# Patient Record
Sex: FEMALE | Race: OTHER | HISPANIC OR LATINO | URBAN - METROPOLITAN AREA
[De-identification: names, ages, dates, MRNs, and addresses within clinical notes are randomized per-mention and may not be internally consistent; named-entity substitution may affect disease eponyms.]

---

## 2017-02-17 ENCOUNTER — OUTPATIENT (OUTPATIENT)
Dept: OUTPATIENT SERVICES | Facility: HOSPITAL | Age: 57
LOS: 1 days | End: 2017-02-17
Payer: COMMERCIAL

## 2017-02-17 VITALS
RESPIRATION RATE: 16 BRPM | HEART RATE: 68 BPM | WEIGHT: 175.93 LBS | HEIGHT: 62 IN | SYSTOLIC BLOOD PRESSURE: 110 MMHG | TEMPERATURE: 98 F | DIASTOLIC BLOOD PRESSURE: 70 MMHG

## 2017-02-17 DIAGNOSIS — J39.8 OTHER SPECIFIED DISEASES OF UPPER RESPIRATORY TRACT: ICD-10-CM

## 2017-02-17 DIAGNOSIS — E21.3 HYPERPARATHYROIDISM, UNSPECIFIED: Chronic | ICD-10-CM

## 2017-02-17 DIAGNOSIS — Z98.89 OTHER SPECIFIED POSTPROCEDURAL STATES: Chronic | ICD-10-CM

## 2017-02-17 DIAGNOSIS — J39.8 OTHER SPECIFIED DISEASES OF UPPER RESPIRATORY TRACT: Chronic | ICD-10-CM

## 2017-02-17 DIAGNOSIS — K25.9 GASTRIC ULCER, UNSPECIFIED AS ACUTE OR CHRONIC, WITHOUT HEMORRHAGE OR PERFORATION: Chronic | ICD-10-CM

## 2017-02-17 LAB
BUN SERPL-MCNC: 15 MG/DL — SIGNIFICANT CHANGE UP (ref 7–23)
CALCIUM SERPL-MCNC: 10.1 MG/DL — SIGNIFICANT CHANGE UP (ref 8.4–10.5)
CHLORIDE SERPL-SCNC: 102 MMOL/L — SIGNIFICANT CHANGE UP (ref 98–107)
CO2 SERPL-SCNC: 27 MMOL/L — SIGNIFICANT CHANGE UP (ref 22–31)
CREAT SERPL-MCNC: 0.6 MG/DL — SIGNIFICANT CHANGE UP (ref 0.5–1.3)
GLUCOSE SERPL-MCNC: 93 MG/DL — SIGNIFICANT CHANGE UP (ref 70–99)
HCT VFR BLD CALC: 39.3 % — SIGNIFICANT CHANGE UP (ref 34.5–45)
HGB BLD-MCNC: 13 G/DL — SIGNIFICANT CHANGE UP (ref 11.5–15.5)
MCHC RBC-ENTMCNC: 31.8 PG — SIGNIFICANT CHANGE UP (ref 27–34)
MCHC RBC-ENTMCNC: 33.1 % — SIGNIFICANT CHANGE UP (ref 32–36)
MCV RBC AUTO: 96.1 FL — SIGNIFICANT CHANGE UP (ref 80–100)
PLATELET # BLD AUTO: 264 K/UL — SIGNIFICANT CHANGE UP (ref 150–400)
PMV BLD: 11.2 FL — SIGNIFICANT CHANGE UP (ref 7–13)
POTASSIUM SERPL-MCNC: 3.8 MMOL/L — SIGNIFICANT CHANGE UP (ref 3.5–5.3)
POTASSIUM SERPL-SCNC: 3.8 MMOL/L — SIGNIFICANT CHANGE UP (ref 3.5–5.3)
RBC # BLD: 4.09 M/UL — SIGNIFICANT CHANGE UP (ref 3.8–5.2)
RBC # FLD: 12 % — SIGNIFICANT CHANGE UP (ref 10.3–14.5)
SODIUM SERPL-SCNC: 141 MMOL/L — SIGNIFICANT CHANGE UP (ref 135–145)
WBC # BLD: 5.62 K/UL — SIGNIFICANT CHANGE UP (ref 3.8–10.5)
WBC # FLD AUTO: 5.62 K/UL — SIGNIFICANT CHANGE UP (ref 3.8–10.5)

## 2017-02-17 PROCEDURE — 93010 ELECTROCARDIOGRAM REPORT: CPT

## 2017-02-17 RX ORDER — SODIUM CHLORIDE 9 MG/ML
1000 INJECTION, SOLUTION INTRAVENOUS
Qty: 0 | Refills: 0 | Status: DISCONTINUED | OUTPATIENT
Start: 2017-02-27 | End: 2017-03-14

## 2017-02-17 NOTE — H&P PST ADULT - NEUROLOGICAL DETAILS
sensation intact/responds to verbal commands/responds to pain/alert and oriented x 3 responds to verbal commands/sensation intact/responds to pain/normal strength/alert and oriented x 3

## 2017-02-17 NOTE — H&P PST ADULT - MUSCULOSKELETAL
details… detailed exam ROM intact/no joint swelling/no joint erythema normal strength/no calf tenderness/ROM intact/no joint swelling/no joint erythema

## 2017-02-17 NOTE — H&P PST ADULT - PSH
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

## 2017-02-17 NOTE — H&P PST ADULT - LYMPHATIC
supraclavicular R/anterior cervical R/posterior cervical L/posterior cervical R/anterior cervical L/supraclavicular L No palpable cervical and supraclavicular lymphadenopathy

## 2017-02-17 NOTE — H&P PST ADULT - RS GEN PE MLT RESP DETAILS PC
no rhonchi/breath sounds equal/clear to auscultation bilaterally/no wheezes/no rales/airway patent no wheezes/respirations non-labored/good air movement/no rales/airway patent/clear to auscultation bilaterally/no rhonchi/breath sounds equal

## 2017-02-17 NOTE — H&P PST ADULT - HISTORY OF PRESENT ILLNESS
This is a 57 y/o female who presents with h/o eventual diagnosis of gastric ulcer after "four endoscopies" in February 2016.  Shortly after, she developed SOB with eventual ER admission and necessity for tracheostomy in April 2016 documented on CT scan. Trach removed and presents today for second followup surveillance bronchoscopy scheduled on 8/1/2016. 57 y/o female who presents with h/o eventual diagnosis of gastric ulcer after "four endoscopies" in February 2016.  Shortly after, she developed SOB with eventual ER admission and necessity for tracheostomy in April 2016 documented on CT scan. Trach removed and presents today for followup surveillance bronchoscopy scheduled on 2/27/2017.

## 2017-02-17 NOTE — H&P PST ADULT - NEGATIVE ALLERGY TYPES
no indoor environmental allergies/no reactions to animals/no reactions to medicines/no reactions to food/no reactions to insect bites/no outdoor environmental allergies

## 2017-02-17 NOTE — H&P PST ADULT - NEGATIVE ENMT SYMPTOMS
no tinnitus/no hearing difficulty/no nasal discharge/no post-nasal discharge/no nasal congestion/no ear pain/no vertigo

## 2017-02-17 NOTE — H&P PST ADULT - MALLAMPATI CLASS
I  with phonation Class I (easy) - visualization of the soft palate, fauces, uvula, and both anterior and posterior pillars

## 2017-02-17 NOTE — H&P PST ADULT - PROBLEM SELECTOR PLAN 1
Scheduled for surveillance bronchoscopy on 2/27/2017. labs done and results pending. Preop instruction given and explained. Famotidine provided with instruction. Verbalized understanding.

## 2017-02-17 NOTE — H&P PST ADULT - NEGATIVE GENERAL GENITOURINARY SYMPTOMS
no renal colic/no incontinence/normal urinary frequency/no hematuria/no bladder infections no flank pain R/no renal colic/no hematuria/no flank pain L/normal urinary frequency/no bladder infections/no incontinence

## 2017-02-17 NOTE — H&P PST ADULT - PMH
Arthritis  right shoulder  Arthritis of shoulder region, right    Constipation    Gastric ulcer  February 2016  Obesity

## 2017-02-17 NOTE — H&P PST ADULT - FAMILY HISTORY
Father  Still living? Unknown  Family history of MI (myocardial infarction), Age at diagnosis: 81-90     Sibling  Still living? No  Family history of MI (myocardial infarction), Age at diagnosis: 31-40

## 2017-02-17 NOTE — H&P PST ADULT - NEGATIVE NEUROLOGICAL SYMPTOMS
no transient paralysis/no weakness/no focal seizures/no paresthesias/no generalized seizures/no headache/no difficulty walking/no vertigo

## 2017-02-27 ENCOUNTER — APPOINTMENT (OUTPATIENT)
Dept: THORACIC SURGERY | Facility: HOSPITAL | Age: 57
End: 2017-02-27

## 2017-02-27 ENCOUNTER — OUTPATIENT (OUTPATIENT)
Dept: OUTPATIENT SERVICES | Facility: HOSPITAL | Age: 57
LOS: 1 days | Discharge: HOME | End: 2017-02-27

## 2017-02-27 ENCOUNTER — TRANSCRIPTION ENCOUNTER (OUTPATIENT)
Age: 57
End: 2017-02-27

## 2017-02-27 VITALS
TEMPERATURE: 98 F | OXYGEN SATURATION: 97 % | RESPIRATION RATE: 16 BRPM | DIASTOLIC BLOOD PRESSURE: 82 MMHG | HEIGHT: 62 IN | HEART RATE: 71 BPM | WEIGHT: 175.93 LBS | SYSTOLIC BLOOD PRESSURE: 120 MMHG

## 2017-02-27 VITALS
RESPIRATION RATE: 15 BRPM | SYSTOLIC BLOOD PRESSURE: 128 MMHG | DIASTOLIC BLOOD PRESSURE: 99 MMHG | HEART RATE: 67 BPM | OXYGEN SATURATION: 100 % | TEMPERATURE: 98 F

## 2017-02-27 DIAGNOSIS — K25.9 GASTRIC ULCER, UNSPECIFIED AS ACUTE OR CHRONIC, WITHOUT HEMORRHAGE OR PERFORATION: Chronic | ICD-10-CM

## 2017-02-27 DIAGNOSIS — J39.8 OTHER SPECIFIED DISEASES OF UPPER RESPIRATORY TRACT: Chronic | ICD-10-CM

## 2017-02-27 DIAGNOSIS — J39.8 OTHER SPECIFIED DISEASES OF UPPER RESPIRATORY TRACT: ICD-10-CM

## 2017-02-27 DIAGNOSIS — E21.3 HYPERPARATHYROIDISM, UNSPECIFIED: Chronic | ICD-10-CM

## 2017-02-27 DIAGNOSIS — Z98.89 OTHER SPECIFIED POSTPROCEDURAL STATES: Chronic | ICD-10-CM

## 2017-02-27 NOTE — ASU PATIENT PROFILE, ADULT - PSH
Gastric ulcer  "four endoscopies" for diagnosis of gastric ulcer in February 2016  Hyperparathyroidism  2012 parathyroidectomy  S/P bronchoscopy  5/16  Tracheal stenosis  April 2016 Gastric ulcer  "four endoscopies" for diagnosis of gastric ulcer in February 2016  Hyperparathyroidism  2012 parathyroidectomy  S/P bronchoscopy  5/16  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

## 2017-02-27 NOTE — ASU DISCHARGE PLAN (ADULT/PEDIATRIC). - NURSING INSTRUCTIONS
Cool and warm liquids that are not irritating to the throat should be given for the first day or two. Avoid hot liquids. Avoid citrus juices and milk. Advance at your own pace starting with soft foods and advancing to a regular diet. Avoid rough and scratchy foods and foods that are difficult to chew for approximately 5 days. Avoid strenuous exercise and blowing of nose.

## 2017-02-27 NOTE — ASU DISCHARGE PLAN (ADULT/PEDIATRIC). - MEDICATION SUMMARY - MEDICATIONS TO TAKE
I will START or STAY ON the medications listed below when I get home from the hospital:    Calcium 500+D  --  by mouth once a day  -- Indication: For supplement    B Complex 50  --   once a day  -- Indication: For supplement    multivitamin  --   once a day last dose 2/20  -- Indication: For supplement    Vitamin D3 50,000 intl units oral capsule  --  by mouth once a week on fridays  -- Indication: For supplement

## 2017-02-27 NOTE — ASU DISCHARGE PLAN (ADULT/PEDIATRIC). - NOTIFY
shortness of breath Bleeding that does not stop/Excessive Diarrhea/Increased Irritability or Sluggishness/Pain not relieved by Medications/Fever greater than 101/Inability to Tolerate Liquids or Foods/Unable to Urinate/Swelling that continues/shortness of breath/Persistent Nausea and Vomiting

## 2017-11-27 ENCOUNTER — TRANSCRIPTION ENCOUNTER (OUTPATIENT)
Age: 57
End: 2017-11-27

## 2017-12-26 ENCOUNTER — TRANSCRIPTION ENCOUNTER (OUTPATIENT)
Age: 57
End: 2017-12-26

## 2018-05-06 ENCOUNTER — TRANSCRIPTION ENCOUNTER (OUTPATIENT)
Age: 58
End: 2018-05-06

## 2018-07-31 ENCOUNTER — EMERGENCY (EMERGENCY)
Facility: HOSPITAL | Age: 58
LOS: 1 days | Discharge: HOME | End: 2018-07-31
Attending: EMERGENCY MEDICINE
Payer: COMMERCIAL

## 2018-07-31 VITALS
HEART RATE: 83 BPM | RESPIRATION RATE: 18 BRPM | SYSTOLIC BLOOD PRESSURE: 104 MMHG | TEMPERATURE: 98 F | OXYGEN SATURATION: 96 % | DIASTOLIC BLOOD PRESSURE: 61 MMHG

## 2018-07-31 DIAGNOSIS — Z98.89 OTHER SPECIFIED POSTPROCEDURAL STATES: Chronic | ICD-10-CM

## 2018-07-31 DIAGNOSIS — E21.3 HYPERPARATHYROIDISM, UNSPECIFIED: Chronic | ICD-10-CM

## 2018-07-31 DIAGNOSIS — K25.9 GASTRIC ULCER, UNSPECIFIED AS ACUTE OR CHRONIC, WITHOUT HEMORRHAGE OR PERFORATION: Chronic | ICD-10-CM

## 2018-07-31 DIAGNOSIS — J39.8 OTHER SPECIFIED DISEASES OF UPPER RESPIRATORY TRACT: Chronic | ICD-10-CM

## 2018-07-31 LAB
ANION GAP SERPL CALC-SCNC: 6 MMOL/L — SIGNIFICANT CHANGE UP (ref 5–17)
APPEARANCE UR: CLEAR — SIGNIFICANT CHANGE UP
BASOPHILS # BLD AUTO: 0.1 K/UL — SIGNIFICANT CHANGE UP (ref 0–0.2)
BASOPHILS NFR BLD AUTO: 1 % — SIGNIFICANT CHANGE UP (ref 0–2)
BILIRUB UR-MCNC: NEGATIVE — SIGNIFICANT CHANGE UP
BUN SERPL-MCNC: 13 MG/DL — SIGNIFICANT CHANGE UP (ref 7–18)
CALCIUM SERPL-MCNC: 9.7 MG/DL — SIGNIFICANT CHANGE UP (ref 8.4–10.5)
CHLORIDE SERPL-SCNC: 108 MMOL/L — SIGNIFICANT CHANGE UP (ref 96–108)
CO2 SERPL-SCNC: 28 MMOL/L — SIGNIFICANT CHANGE UP (ref 22–31)
COLOR SPEC: YELLOW — SIGNIFICANT CHANGE UP
CREAT SERPL-MCNC: 0.64 MG/DL — SIGNIFICANT CHANGE UP (ref 0.5–1.3)
DIFF PNL FLD: NEGATIVE — SIGNIFICANT CHANGE UP
EOSINOPHIL # BLD AUTO: 0.2 K/UL — SIGNIFICANT CHANGE UP (ref 0–0.5)
EOSINOPHIL NFR BLD AUTO: 3 % — SIGNIFICANT CHANGE UP (ref 0–6)
GLUCOSE SERPL-MCNC: 108 MG/DL — HIGH (ref 70–99)
GLUCOSE UR QL: NEGATIVE — SIGNIFICANT CHANGE UP
HCT VFR BLD CALC: 39.9 % — SIGNIFICANT CHANGE UP (ref 34.5–45)
HGB BLD-MCNC: 13 G/DL — SIGNIFICANT CHANGE UP (ref 11.5–15.5)
KETONES UR-MCNC: NEGATIVE — SIGNIFICANT CHANGE UP
LEUKOCYTE ESTERASE UR-ACNC: NEGATIVE — SIGNIFICANT CHANGE UP
LYMPHOCYTES # BLD AUTO: 2.3 K/UL — SIGNIFICANT CHANGE UP (ref 1–3.3)
LYMPHOCYTES # BLD AUTO: 38.2 % — SIGNIFICANT CHANGE UP (ref 13–44)
MCHC RBC-ENTMCNC: 31.9 PG — SIGNIFICANT CHANGE UP (ref 27–34)
MCHC RBC-ENTMCNC: 32.5 GM/DL — SIGNIFICANT CHANGE UP (ref 32–36)
MCV RBC AUTO: 98 FL — SIGNIFICANT CHANGE UP (ref 80–100)
MONOCYTES # BLD AUTO: 0.3 K/UL — SIGNIFICANT CHANGE UP (ref 0–0.9)
MONOCYTES NFR BLD AUTO: 5.7 % — SIGNIFICANT CHANGE UP (ref 2–14)
NEUTROPHILS # BLD AUTO: 3.1 K/UL — SIGNIFICANT CHANGE UP (ref 1.8–7.4)
NEUTROPHILS NFR BLD AUTO: 52.1 % — SIGNIFICANT CHANGE UP (ref 43–77)
NITRITE UR-MCNC: NEGATIVE — SIGNIFICANT CHANGE UP
PH UR: 7 — SIGNIFICANT CHANGE UP (ref 5–8)
PLATELET # BLD AUTO: 262 K/UL — SIGNIFICANT CHANGE UP (ref 150–400)
POTASSIUM SERPL-MCNC: 4 MMOL/L — SIGNIFICANT CHANGE UP (ref 3.5–5.3)
POTASSIUM SERPL-SCNC: 4 MMOL/L — SIGNIFICANT CHANGE UP (ref 3.5–5.3)
PROT UR-MCNC: NEGATIVE — SIGNIFICANT CHANGE UP
RBC # BLD: 4.07 M/UL — SIGNIFICANT CHANGE UP (ref 3.8–5.2)
RBC # FLD: 11.6 % — SIGNIFICANT CHANGE UP (ref 10.3–14.5)
SODIUM SERPL-SCNC: 142 MMOL/L — SIGNIFICANT CHANGE UP (ref 135–145)
SP GR SPEC: 1 — LOW (ref 1.01–1.02)
UROBILINOGEN FLD QL: NEGATIVE — SIGNIFICANT CHANGE UP
WBC # BLD: 6 K/UL — SIGNIFICANT CHANGE UP (ref 3.8–10.5)
WBC # FLD AUTO: 6 K/UL — SIGNIFICANT CHANGE UP (ref 3.8–10.5)

## 2018-07-31 PROCEDURE — 80048 BASIC METABOLIC PNL TOTAL CA: CPT

## 2018-07-31 PROCEDURE — 99284 EMERGENCY DEPT VISIT MOD MDM: CPT | Mod: 25

## 2018-07-31 PROCEDURE — 72100 X-RAY EXAM L-S SPINE 2/3 VWS: CPT | Mod: 26

## 2018-07-31 PROCEDURE — 81003 URINALYSIS AUTO W/O SCOPE: CPT

## 2018-07-31 PROCEDURE — 99284 EMERGENCY DEPT VISIT MOD MDM: CPT

## 2018-07-31 PROCEDURE — 72100 X-RAY EXAM L-S SPINE 2/3 VWS: CPT

## 2018-07-31 PROCEDURE — 85027 COMPLETE CBC AUTOMATED: CPT

## 2018-07-31 PROCEDURE — 73080 X-RAY EXAM OF ELBOW: CPT

## 2018-07-31 PROCEDURE — 96372 THER/PROPH/DIAG INJ SC/IM: CPT

## 2018-07-31 PROCEDURE — 73080 X-RAY EXAM OF ELBOW: CPT | Mod: 26,LT

## 2018-07-31 RX ORDER — ACETAMINOPHEN 500 MG
650 TABLET ORAL ONCE
Qty: 0 | Refills: 0 | Status: COMPLETED | OUTPATIENT
Start: 2018-07-31 | End: 2018-07-31

## 2018-07-31 RX ORDER — DIAZEPAM 5 MG
5 TABLET ORAL ONCE
Qty: 0 | Refills: 0 | Status: DISCONTINUED | OUTPATIENT
Start: 2018-07-31 | End: 2018-07-31

## 2018-07-31 RX ORDER — IBUPROFEN 200 MG
600 TABLET ORAL ONCE
Qty: 0 | Refills: 0 | Status: COMPLETED | OUTPATIENT
Start: 2018-07-31 | End: 2018-07-31

## 2018-07-31 RX ORDER — KETOROLAC TROMETHAMINE 30 MG/ML
30 SYRINGE (ML) INJECTION ONCE
Qty: 0 | Refills: 0 | Status: DISCONTINUED | OUTPATIENT
Start: 2018-07-31 | End: 2018-07-31

## 2018-07-31 RX ADMIN — Medication 30 MILLIGRAM(S): at 14:40

## 2018-07-31 NOTE — ED PROVIDER NOTE - OBJECTIVE STATEMENT
59 y/o F pt with PMHx of thyroid disease (no medications), arthritis, constipation, and gastric ulcer presents to ED c/o progressively worsening R lower back pain x 1 week. Patient denies fever, chills, nausea, vomiting, dysuria, or any other complaints.

## 2018-07-31 NOTE — ED ADULT TRIAGE NOTE - CHIEF COMPLAINT QUOTE
right lower back pain radiating to RLQ x 1 week on and off and getting worse, patient denies nausea and denies vomiting

## 2018-07-31 NOTE — ED ADULT NURSE NOTE - NSIMPLEMENTINTERV_GEN_ALL_ED
Implemented All Universal Safety Interventions:  [place] to call system. Call bell, personal items and telephone within reach. Instruct patient to call for assistance. Room bathroom lighting operational. Non-slip footwear when patient is off stretcher. Physically safe environment: no spills, clutter or unnecessary equipment. Stretcher in lowest position, wheels locked, appropriate side rails in place.

## 2018-07-31 NOTE — ED ADULT NURSE NOTE - CHPI ED NUR SYMPTOMS NEG
no bowel dysfunction/no constipation/no motor function loss/no numbness/no fatigue/no difficulty bearing weight/no bladder dysfunction/no anorexia/no neck tenderness/no tingling

## 2018-07-31 NOTE — ED PROVIDER NOTE - PROGRESS NOTE DETAILS
Labs, urine unremarkable. Xrays shows chronic findings. No acute fracture. Will dc with PMD follow up. History and findings suggestive of back strain/spasm. Pt is well appearing walking with steady gait, stable for discharge and follow up without fail with medical doctor. I had a detailed discussion with the patient and/or guardian regarding the historical points, exam findings, and any diagnostic results supporting the discharge diagnosis. Pt educated on care and need for follow up. Strict return instructions and red flag signs and symptoms discussed with patient. Questions answered. Pt shows understanding of discharge information and agrees to follow.

## 2018-07-31 NOTE — ED STATDOCS - OBJECTIVE STATEMENT
c/o low back pain x three days right> left , worse with movement , normal gait observed   no midline tenderness   no urinary complaints no h/o trauma , no heavy lifting   valium / motrin

## 2018-07-31 NOTE — ED ADULT NURSE NOTE - OBJECTIVE STATEMENT
Pt AOx3, ambulatory, c/o Right lower back x 1 week, radiating to RLQ, and worsenign past couple of days. Pt also endorses Left elbow pain, Denies n/v, fall/trauma, HA, SOB, chest pain, hematuria, dysuria

## 2018-07-31 NOTE — ED PROVIDER NOTE - PSH
Gastric ulcer  "four endoscopies" for diagnosis of gastric ulcer in February 2016  Hyperparathyroidism  2012 parathyroidectomy  S/P bronchoscopy  5/16  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

## 2018-07-31 NOTE — ED PROVIDER NOTE - PMH
Arthritis  right shoulder  Arthritis of shoulder region, right    Constipation    Gastric ulcer  February 2016  Obesity    Thyroid disease

## 2018-10-29 PROBLEM — E07.9 DISORDER OF THYROID, UNSPECIFIED: Chronic | Status: ACTIVE | Noted: 2018-07-31

## 2018-11-05 ENCOUNTER — OUTPATIENT (OUTPATIENT)
Dept: OUTPATIENT SERVICES | Facility: HOSPITAL | Age: 58
LOS: 1 days | End: 2018-11-05

## 2018-11-05 VITALS
RESPIRATION RATE: 16 BRPM | DIASTOLIC BLOOD PRESSURE: 80 MMHG | TEMPERATURE: 98 F | HEIGHT: 64 IN | OXYGEN SATURATION: 97 % | SYSTOLIC BLOOD PRESSURE: 110 MMHG | WEIGHT: 188.94 LBS | HEART RATE: 70 BPM

## 2018-11-05 DIAGNOSIS — J39.8 OTHER SPECIFIED DISEASES OF UPPER RESPIRATORY TRACT: Chronic | ICD-10-CM

## 2018-11-05 DIAGNOSIS — J39.8 OTHER SPECIFIED DISEASES OF UPPER RESPIRATORY TRACT: ICD-10-CM

## 2018-11-05 DIAGNOSIS — Z98.89 OTHER SPECIFIED POSTPROCEDURAL STATES: Chronic | ICD-10-CM

## 2018-11-05 DIAGNOSIS — E03.9 HYPOTHYROIDISM, UNSPECIFIED: ICD-10-CM

## 2018-11-05 DIAGNOSIS — E21.3 HYPERPARATHYROIDISM, UNSPECIFIED: Chronic | ICD-10-CM

## 2018-11-05 DIAGNOSIS — Z98.890 OTHER SPECIFIED POSTPROCEDURAL STATES: Chronic | ICD-10-CM

## 2018-11-05 DIAGNOSIS — K25.9 GASTRIC ULCER, UNSPECIFIED AS ACUTE OR CHRONIC, WITHOUT HEMORRHAGE OR PERFORATION: Chronic | ICD-10-CM

## 2018-11-05 LAB
ALBUMIN SERPL ELPH-MCNC: 3.6 G/DL — SIGNIFICANT CHANGE UP (ref 3.3–5)
ALP SERPL-CCNC: 61 U/L — SIGNIFICANT CHANGE UP (ref 40–120)
ALT FLD-CCNC: 22 U/L — SIGNIFICANT CHANGE UP (ref 4–33)
AST SERPL-CCNC: 25 U/L — SIGNIFICANT CHANGE UP (ref 4–32)
BILIRUB SERPL-MCNC: 0.3 MG/DL — SIGNIFICANT CHANGE UP (ref 0.2–1.2)
BUN SERPL-MCNC: 16 MG/DL — SIGNIFICANT CHANGE UP (ref 7–23)
CALCIUM SERPL-MCNC: 10 MG/DL — SIGNIFICANT CHANGE UP (ref 8.4–10.5)
CHLORIDE SERPL-SCNC: 107 MMOL/L — SIGNIFICANT CHANGE UP (ref 98–107)
CO2 SERPL-SCNC: 23 MMOL/L — SIGNIFICANT CHANGE UP (ref 22–31)
CREAT SERPL-MCNC: 0.54 MG/DL — SIGNIFICANT CHANGE UP (ref 0.5–1.3)
GLUCOSE SERPL-MCNC: 115 MG/DL — HIGH (ref 70–99)
HCT VFR BLD CALC: 38.3 % — SIGNIFICANT CHANGE UP (ref 34.5–45)
HGB BLD-MCNC: 12.5 G/DL — SIGNIFICANT CHANGE UP (ref 11.5–15.5)
MCHC RBC-ENTMCNC: 31.5 PG — SIGNIFICANT CHANGE UP (ref 27–34)
MCHC RBC-ENTMCNC: 32.6 % — SIGNIFICANT CHANGE UP (ref 32–36)
MCV RBC AUTO: 96.5 FL — SIGNIFICANT CHANGE UP (ref 80–100)
NRBC # FLD: 0 — SIGNIFICANT CHANGE UP
PLATELET # BLD AUTO: 248 K/UL — SIGNIFICANT CHANGE UP (ref 150–400)
PMV BLD: 10.9 FL — SIGNIFICANT CHANGE UP (ref 7–13)
POTASSIUM SERPL-MCNC: 4.2 MMOL/L — SIGNIFICANT CHANGE UP (ref 3.5–5.3)
POTASSIUM SERPL-SCNC: 4.2 MMOL/L — SIGNIFICANT CHANGE UP (ref 3.5–5.3)
PROT SERPL-MCNC: 7 G/DL — SIGNIFICANT CHANGE UP (ref 6–8.3)
RBC # BLD: 3.97 M/UL — SIGNIFICANT CHANGE UP (ref 3.8–5.2)
RBC # FLD: 11.6 % — SIGNIFICANT CHANGE UP (ref 10.3–14.5)
SODIUM SERPL-SCNC: 141 MMOL/L — SIGNIFICANT CHANGE UP (ref 135–145)
WBC # BLD: 4.47 K/UL — SIGNIFICANT CHANGE UP (ref 3.8–10.5)
WBC # FLD AUTO: 4.47 K/UL — SIGNIFICANT CHANGE UP (ref 3.8–10.5)

## 2018-11-05 RX ORDER — SODIUM CHLORIDE 9 MG/ML
1000 INJECTION, SOLUTION INTRAVENOUS
Qty: 0 | Refills: 0 | Status: DISCONTINUED | OUTPATIENT
Start: 2018-11-19 | End: 2018-12-04

## 2018-11-05 NOTE — H&P PST ADULT - NEGATIVE GENERAL GENITOURINARY SYMPTOMS
normal urinary frequency/no urine discoloration/no hematuria/no flank pain R/no incontinence/no bladder infections/no renal colic/no urinary hesitancy

## 2018-11-05 NOTE — H&P PST ADULT - PMH
Arthritis  right shoulder  Arthritis of shoulder region, right    Constipation    Fatty liver    Gallstones    Gastric ulcer  February 2016- h/o GI bleed which patient had to be intubated Jan 2016  History of tracheal stenosis    Hypothyroidism    Kidney stones    Lumbar back pain    Obesity    Osteoporosis    Other specified diseases of upper respiratory tract    Thyroid disease

## 2018-11-05 NOTE — H&P PST ADULT - NEGATIVE ENMT SYMPTOMS
no nasal discharge/no gum bleeding/no throat pain/no tinnitus/no vertigo/no sinus symptoms/no nasal congestion/no post-nasal discharge/no hearing difficulty/no ear pain/no nose bleeds/no dry mouth/no dysphagia

## 2018-11-05 NOTE — H&P PST ADULT - PROBLEM SELECTOR PLAN 1
Scheduled for Surveillance Bronchoscopy on 11/19/2018   Preop instructions given, pt verbalized understanding   GI prophylaxis provided

## 2018-11-05 NOTE — H&P PST ADULT - NEGATIVE OPHTHALMOLOGIC SYMPTOMS
no pain R/no lacrimation L/no lacrimation R/no blurred vision L/no diplopia/no photophobia/no blurred vision R/no pain L

## 2018-11-05 NOTE — H&P PST ADULT - MS GEN HX ROS MEA POS PC
arthralgia/arthritis/leg pain L/joint pain/leg pain R/neck pain/back pain/left knee and b/l shoulder pain

## 2018-11-05 NOTE — H&P PST ADULT - RS GEN PE MLT RESP DETAILS PC
respirations non-labored/no wheezes/good air movement/airway patent/no chest wall tenderness/breath sounds equal/clear to auscultation bilaterally

## 2018-11-05 NOTE — H&P PST ADULT - HISTORY OF PRESENT ILLNESS
57 y/o female with PMH of Hypothyroidism presents to [facility] for preoperative evaluation with diagnosis of other specified diseases of upper respiratory tract. H/o GI bleed in 2016 which resulted in emergency intubation for 5 days. Pt reports her trachea was damaged as a result of intubation. s/p tracheal resection and reconstruction April 2016.  She has undergone multiple surveillance bronchoscopies, last one in 2017. Scheduled for Surveillance Bronchoscopy on 11/19/2018   Pt reports voice hoarseness when talking for long periods. 57 y/o female with PMH of Hypothyroidism presents to [facility] for preoperative evaluation with diagnosis of other specified diseases of upper respiratory tract. H/o GI bleed in 2016 which resulted in emergency intubation for 5 days. Pt reports her trachea was damaged as a result of intubation. s/p tracheal resection and reconstruction April 2016. She has undergone multiple surveillance bronchoscopies, last one in 2017. Scheduled for Surveillance Bronchoscopy on 11/19/2018.   Pt reports voice hoarseness when talking for long periods.

## 2018-11-05 NOTE — H&P PST ADULT - NEGATIVE NEUROLOGICAL SYMPTOMS
no transient paralysis/no paresthesias/no syncope/no focal seizures/no tremors/no weakness/no generalized seizures/no difficulty walking/no loss of consciousness/no vertigo/no headache/no confusion/no hemiparesis/no facial palsy

## 2018-11-05 NOTE — H&P PST ADULT - PSH
Gastric ulcer  "four endoscopies" for diagnosis of gastric ulcer in February 2016  Hyperparathyroidism  2012 parathyroidectomy  S/P bronchoscopy  2017  S/P bronchoscopy  5/16  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

## 2018-11-05 NOTE — H&P PST ADULT - LYMPHATIC
anterior cervical L/posterior cervical R/anterior cervical R/posterior cervical L/supraclavicular L/supraclavicular R

## 2018-11-06 ENCOUNTER — EMERGENCY (EMERGENCY)
Facility: HOSPITAL | Age: 58
LOS: 1 days | Discharge: HOME | End: 2018-11-06
Attending: EMERGENCY MEDICINE
Payer: COMMERCIAL

## 2018-11-06 VITALS
WEIGHT: 188.94 LBS | TEMPERATURE: 98 F | SYSTOLIC BLOOD PRESSURE: 116 MMHG | RESPIRATION RATE: 18 BRPM | DIASTOLIC BLOOD PRESSURE: 77 MMHG | HEART RATE: 95 BPM | OXYGEN SATURATION: 98 % | HEIGHT: 63 IN

## 2018-11-06 DIAGNOSIS — J39.8 OTHER SPECIFIED DISEASES OF UPPER RESPIRATORY TRACT: Chronic | ICD-10-CM

## 2018-11-06 DIAGNOSIS — Z98.89 OTHER SPECIFIED POSTPROCEDURAL STATES: Chronic | ICD-10-CM

## 2018-11-06 DIAGNOSIS — Z98.890 OTHER SPECIFIED POSTPROCEDURAL STATES: Chronic | ICD-10-CM

## 2018-11-06 DIAGNOSIS — E21.3 HYPERPARATHYROIDISM, UNSPECIFIED: Chronic | ICD-10-CM

## 2018-11-06 DIAGNOSIS — K25.9 GASTRIC ULCER, UNSPECIFIED AS ACUTE OR CHRONIC, WITHOUT HEMORRHAGE OR PERFORATION: Chronic | ICD-10-CM

## 2018-11-06 LAB
ALBUMIN SERPL ELPH-MCNC: 3 G/DL — LOW (ref 3.5–5)
ALP SERPL-CCNC: 63 U/L — SIGNIFICANT CHANGE UP (ref 40–120)
ALT FLD-CCNC: 26 U/L DA — SIGNIFICANT CHANGE UP (ref 10–60)
ANION GAP SERPL CALC-SCNC: 7 MMOL/L — SIGNIFICANT CHANGE UP (ref 5–17)
APPEARANCE UR: CLEAR — SIGNIFICANT CHANGE UP
APTT BLD: 36.1 SEC — SIGNIFICANT CHANGE UP (ref 27.5–36.3)
AST SERPL-CCNC: 16 U/L — SIGNIFICANT CHANGE UP (ref 10–40)
BILIRUB SERPL-MCNC: 0.2 MG/DL — SIGNIFICANT CHANGE UP (ref 0.2–1.2)
BILIRUB UR-MCNC: NEGATIVE — SIGNIFICANT CHANGE UP
BUN SERPL-MCNC: 21 MG/DL — HIGH (ref 7–18)
CALCIUM SERPL-MCNC: 9.4 MG/DL — SIGNIFICANT CHANGE UP (ref 8.4–10.5)
CHLORIDE SERPL-SCNC: 110 MMOL/L — HIGH (ref 96–108)
CO2 SERPL-SCNC: 27 MMOL/L — SIGNIFICANT CHANGE UP (ref 22–31)
COLOR SPEC: YELLOW — SIGNIFICANT CHANGE UP
CREAT SERPL-MCNC: 0.59 MG/DL — SIGNIFICANT CHANGE UP (ref 0.5–1.3)
DIFF PNL FLD: NEGATIVE — SIGNIFICANT CHANGE UP
GLUCOSE SERPL-MCNC: 137 MG/DL — HIGH (ref 70–99)
GLUCOSE UR QL: NEGATIVE — SIGNIFICANT CHANGE UP
HCG SERPL-ACNC: 5 MIU/ML — SIGNIFICANT CHANGE UP
HCT VFR BLD CALC: 36 % — SIGNIFICANT CHANGE UP (ref 34.5–45)
HGB BLD-MCNC: 12 G/DL — SIGNIFICANT CHANGE UP (ref 11.5–15.5)
INR BLD: 0.91 RATIO — SIGNIFICANT CHANGE UP (ref 0.88–1.16)
KETONES UR-MCNC: NEGATIVE — SIGNIFICANT CHANGE UP
LEUKOCYTE ESTERASE UR-ACNC: ABNORMAL
MCHC RBC-ENTMCNC: 31.5 PG — SIGNIFICANT CHANGE UP (ref 27–34)
MCHC RBC-ENTMCNC: 33.3 GM/DL — SIGNIFICANT CHANGE UP (ref 32–36)
MCV RBC AUTO: 94.5 FL — SIGNIFICANT CHANGE UP (ref 80–100)
NITRITE UR-MCNC: NEGATIVE — SIGNIFICANT CHANGE UP
PH UR: 5 — SIGNIFICANT CHANGE UP (ref 5–8)
PLATELET # BLD AUTO: 246 K/UL — SIGNIFICANT CHANGE UP (ref 150–400)
POTASSIUM SERPL-MCNC: 4.4 MMOL/L — SIGNIFICANT CHANGE UP (ref 3.5–5.3)
POTASSIUM SERPL-SCNC: 4.4 MMOL/L — SIGNIFICANT CHANGE UP (ref 3.5–5.3)
PROT SERPL-MCNC: 6.8 G/DL — SIGNIFICANT CHANGE UP (ref 6–8.3)
PROT UR-MCNC: NEGATIVE — SIGNIFICANT CHANGE UP
PROTHROM AB SERPL-ACNC: 10.1 SEC — SIGNIFICANT CHANGE UP (ref 10–12.9)
RBC # BLD: 3.81 M/UL — SIGNIFICANT CHANGE UP (ref 3.8–5.2)
RBC # FLD: 11.3 % — SIGNIFICANT CHANGE UP (ref 10.3–14.5)
SODIUM SERPL-SCNC: 144 MMOL/L — SIGNIFICANT CHANGE UP (ref 135–145)
SP GR SPEC: 1.02 — SIGNIFICANT CHANGE UP (ref 1.01–1.02)
TROPONIN I SERPL-MCNC: <0.015 NG/ML — SIGNIFICANT CHANGE UP (ref 0–0.04)
UROBILINOGEN FLD QL: NEGATIVE — SIGNIFICANT CHANGE UP
WBC # BLD: 6.1 K/UL — SIGNIFICANT CHANGE UP (ref 3.8–10.5)
WBC # FLD AUTO: 6.1 K/UL — SIGNIFICANT CHANGE UP (ref 3.8–10.5)

## 2018-11-06 PROCEDURE — 99284 EMERGENCY DEPT VISIT MOD MDM: CPT | Mod: 25

## 2018-11-06 PROCEDURE — 71046 X-RAY EXAM CHEST 2 VIEWS: CPT | Mod: 26

## 2018-11-06 RX ORDER — ACETAMINOPHEN 500 MG
650 TABLET ORAL ONCE
Qty: 0 | Refills: 0 | Status: COMPLETED | OUTPATIENT
Start: 2018-11-06 | End: 2018-11-06

## 2018-11-06 RX ORDER — IBUPROFEN 200 MG
800 TABLET ORAL ONCE
Qty: 0 | Refills: 0 | Status: COMPLETED | OUTPATIENT
Start: 2018-11-06 | End: 2018-11-06

## 2018-11-06 NOTE — ED PROVIDER NOTE - OBJECTIVE STATEMENT
59 y/o F w/ PMHx of hypothyroidism, gallstones, GI bleed 2/2 ulcers presents to ED c/o intermittent, sharp, chest pain x 12 hours, with each episode lasting 2-3 minutes at a time, 8/10 in strength. Pt is in no pain currently. Notes that pain first onset w/ nothing in particular, returned when doing laundry. Pt denies any shortness of breath, nausea, vomiting, fever, cough, leg swelling or any other complaints. Denies any long distance traveling, recent surgery, oral contraceptive use, smoking. NKDA. 59 y/o F w/ PMHx of hypothyroidism, gallstones, GI bleed 2/2 ulcers presents to ED c/o intermittent, sharp, chest pain x 12 hours, with each episode lasting couple seconds at a time, 8/10 in strength. Pt is in no pain currently. Denies any exacerbating symptoms or any association w/ exertion. Notes that pain first onset w/ nothing in particular, returned when doing laundry. Pt denies any shortness of breath, nausea, vomiting, fever, cough, leg swelling or any other complaints. Denies any long distance traveling, recent surgery, oral contraceptive use, smoking. NKDA. 59 y/o F w/ PMHx of hypothyroidism, gallstones, kidney stones, GI bleed 2/2 ulcers presents to ED c/o intermittent, sharp, chest pain x 12 hours, with each episode lasting couple seconds at a time, 8/10 in strength. Pt is in no pain currently. Denies any exacerbating symptoms or any association w/ exertion. Notes that pain first onset w/ nothing in particular, returned when doing laundry. Pt denies any shortness of breath, nausea, vomiting, fever, cough, leg swelling or any other complaints. Denies any long distance traveling, recent surgery, oral contraceptive use, smoking. NKDA. 57 y/o F w/ PMHx of hypothyroidism, gallstones, kidney stones, GI bleed 2/2 ulcers presents to ED c/o intermittent, sharp, chest pain x 12 hours, with each episode lasting couple seconds at a time, 8/10 in strength. Pt is in no pain currently. Denies any exacerbating symptoms or any association w/ exertion. Notes that pain first onset w/ nothing in particular, returned when doing laundry. Pt denies any shortness of breath, nausea, vomiting, fever, cough, leg swelling or any other complaints. Denies any long distance traveling, recent surgery, oral contraceptive use, smoking. NKDA. Reports darker urine.

## 2018-11-06 NOTE — ED PROVIDER NOTE - CARE PLAN
Principal Discharge DX:	Atypical chest pain Principal Discharge DX:	Atypical chest pain  Secondary Diagnosis:	UTI (urinary tract infection)

## 2018-11-06 NOTE — ED PROVIDER NOTE - PROGRESS NOTE DETAILS
labs show neg trop x2, CXR unremarkable  discussed above with patient. On reeval patient well-appearing, denies recurrence of chest pain.  Pt stable for discharge to f/u with PMD/Cardiology as outpatient. labs show neg trop x2, CXR unremarkable  UA shows trace LE and few bacteria-given ceftin  discussed above with patient. On reeval patient well-appearing, denies recurrence of chest pain.  Pt stable for discharge to f/u with PMD/Cardiology as outpatient.

## 2018-11-06 NOTE — ED PROVIDER NOTE - MEDICAL DECISION MAKING DETAILS
59 y/o F w/ PMHx of hypothyroidism, GI bleed, here w/ intermittent, sharp, anterior chest pain. On exam, pt w/ tenderness to palpation over anterior chest. Will check EKG, labs, chest x-ray. Pt given Tylenol and Motrin for pain. Will re-assess.

## 2018-11-07 VITALS
RESPIRATION RATE: 17 BRPM | TEMPERATURE: 98 F | DIASTOLIC BLOOD PRESSURE: 60 MMHG | OXYGEN SATURATION: 97 % | HEART RATE: 64 BPM | SYSTOLIC BLOOD PRESSURE: 120 MMHG

## 2018-11-07 PROBLEM — K80.20 CALCULUS OF GALLBLADDER WITHOUT CHOLECYSTITIS WITHOUT OBSTRUCTION: Chronic | Status: ACTIVE | Noted: 2018-11-05

## 2018-11-07 PROBLEM — J39.8 OTHER SPECIFIED DISEASES OF UPPER RESPIRATORY TRACT: Chronic | Status: ACTIVE | Noted: 2018-11-05

## 2018-11-07 PROBLEM — M54.5 LOW BACK PAIN: Chronic | Status: ACTIVE | Noted: 2018-11-05

## 2018-11-07 PROBLEM — Z87.09 PERSONAL HISTORY OF OTHER DISEASES OF THE RESPIRATORY SYSTEM: Chronic | Status: ACTIVE | Noted: 2018-11-05

## 2018-11-07 PROBLEM — N20.0 CALCULUS OF KIDNEY: Chronic | Status: ACTIVE | Noted: 2018-11-05

## 2018-11-07 PROBLEM — E03.9 HYPOTHYROIDISM, UNSPECIFIED: Chronic | Status: ACTIVE | Noted: 2018-11-05

## 2018-11-07 PROBLEM — M81.0 AGE-RELATED OSTEOPOROSIS WITHOUT CURRENT PATHOLOGICAL FRACTURE: Chronic | Status: ACTIVE | Noted: 2018-11-05

## 2018-11-07 PROBLEM — K76.0 FATTY (CHANGE OF) LIVER, NOT ELSEWHERE CLASSIFIED: Chronic | Status: ACTIVE | Noted: 2018-11-05

## 2018-11-07 LAB — TROPONIN I SERPL-MCNC: <0.015 NG/ML — SIGNIFICANT CHANGE UP (ref 0–0.04)

## 2018-11-07 PROCEDURE — 80053 COMPREHEN METABOLIC PANEL: CPT

## 2018-11-07 PROCEDURE — 81001 URINALYSIS AUTO W/SCOPE: CPT

## 2018-11-07 PROCEDURE — 85730 THROMBOPLASTIN TIME PARTIAL: CPT

## 2018-11-07 PROCEDURE — 87086 URINE CULTURE/COLONY COUNT: CPT

## 2018-11-07 PROCEDURE — 84702 CHORIONIC GONADOTROPIN TEST: CPT

## 2018-11-07 PROCEDURE — 99284 EMERGENCY DEPT VISIT MOD MDM: CPT | Mod: 25

## 2018-11-07 PROCEDURE — 85610 PROTHROMBIN TIME: CPT

## 2018-11-07 PROCEDURE — 71046 X-RAY EXAM CHEST 2 VIEWS: CPT

## 2018-11-07 PROCEDURE — 93005 ELECTROCARDIOGRAM TRACING: CPT

## 2018-11-07 PROCEDURE — 96374 THER/PROPH/DIAG INJ IV PUSH: CPT

## 2018-11-07 PROCEDURE — 96375 TX/PRO/DX INJ NEW DRUG ADDON: CPT

## 2018-11-07 PROCEDURE — 85027 COMPLETE CBC AUTOMATED: CPT

## 2018-11-07 PROCEDURE — 84484 ASSAY OF TROPONIN QUANT: CPT

## 2018-11-07 RX ORDER — MORPHINE SULFATE 50 MG/1
2 CAPSULE, EXTENDED RELEASE ORAL ONCE
Qty: 0 | Refills: 0 | Status: DISCONTINUED | OUTPATIENT
Start: 2018-11-07 | End: 2018-11-07

## 2018-11-07 RX ORDER — KETOROLAC TROMETHAMINE 30 MG/ML
30 SYRINGE (ML) INJECTION ONCE
Qty: 0 | Refills: 0 | Status: DISCONTINUED | OUTPATIENT
Start: 2018-11-07 | End: 2018-11-07

## 2018-11-07 RX ORDER — CEFUROXIME AXETIL 250 MG
1 TABLET ORAL
Qty: 14 | Refills: 0
Start: 2018-11-07 | End: 2018-11-13

## 2018-11-07 RX ORDER — CEFUROXIME AXETIL 250 MG
250 TABLET ORAL ONCE
Qty: 0 | Refills: 0 | Status: COMPLETED | OUTPATIENT
Start: 2018-11-07 | End: 2018-11-07

## 2018-11-07 RX ADMIN — Medication 800 MILLIGRAM(S): at 01:35

## 2018-11-07 RX ADMIN — Medication 800 MILLIGRAM(S): at 00:06

## 2018-11-07 RX ADMIN — Medication 30 MILLIGRAM(S): at 05:04

## 2018-11-07 RX ADMIN — Medication 650 MILLIGRAM(S): at 00:06

## 2018-11-07 RX ADMIN — Medication 30 MILLIGRAM(S): at 04:50

## 2018-11-07 RX ADMIN — MORPHINE SULFATE 2 MILLIGRAM(S): 50 CAPSULE, EXTENDED RELEASE ORAL at 01:31

## 2018-11-07 RX ADMIN — Medication 250 MILLIGRAM(S): at 01:31

## 2018-11-07 RX ADMIN — MORPHINE SULFATE 2 MILLIGRAM(S): 50 CAPSULE, EXTENDED RELEASE ORAL at 01:35

## 2018-11-07 RX ADMIN — Medication 650 MILLIGRAM(S): at 01:35

## 2018-11-07 NOTE — ED ADULT NURSE NOTE - OBJECTIVE STATEMENT
57 y/o F w/ PMHx of hypothyroidism, gallstones, GI bleed 2/2 ulcers presents to ED c/o intermittent, sharp, chest pain x 12 hours, with each episode lasting couple seconds at a time, 8/10 in strength. Pt is in no pain currently. Denies any exacerbating symptoms or any association w/ exertion. Notes that pain first onset w/ nothing in particular, returned when doing laundry. Pt denies any shortness of breath, nausea, vomiting, fever, cough, leg swelling or any other complaints. Denies any long distance traveling, recent surgery, oral contraceptive use, smoking. NKDA.

## 2018-11-07 NOTE — ED ADULT NURSE NOTE - NSIMPLEMENTINTERV_GEN_ALL_ED
Implemented All Universal Safety Interventions:  [place] to call system. Call bell, personal items and telephone within reach. Instruct patient to call for assistance. Room bathroom lighting operational. Non-slip footwear when patient is off stretcher. Physically safe environment: no spills, clutter or unnecessary equipment. Stretcher in lowest position, wheels locked, appropriate side rails in place.

## 2018-11-08 LAB
CULTURE RESULTS: SIGNIFICANT CHANGE UP
SPECIMEN SOURCE: SIGNIFICANT CHANGE UP

## 2018-11-18 ENCOUNTER — TRANSCRIPTION ENCOUNTER (OUTPATIENT)
Age: 58
End: 2018-11-18

## 2018-11-19 ENCOUNTER — APPOINTMENT (OUTPATIENT)
Dept: THORACIC SURGERY | Facility: HOSPITAL | Age: 58
End: 2018-11-19

## 2018-11-19 ENCOUNTER — OUTPATIENT (OUTPATIENT)
Dept: OUTPATIENT SERVICES | Facility: HOSPITAL | Age: 58
LOS: 1 days | Discharge: HOME | End: 2018-11-19
Payer: COMMERCIAL

## 2018-11-19 VITALS
DIASTOLIC BLOOD PRESSURE: 67 MMHG | HEIGHT: 64 IN | HEART RATE: 92 BPM | OXYGEN SATURATION: 96 % | SYSTOLIC BLOOD PRESSURE: 124 MMHG | TEMPERATURE: 98 F | RESPIRATION RATE: 14 BRPM | WEIGHT: 188.94 LBS

## 2018-11-19 VITALS
OXYGEN SATURATION: 98 % | RESPIRATION RATE: 15 BRPM | SYSTOLIC BLOOD PRESSURE: 114 MMHG | HEART RATE: 81 BPM | DIASTOLIC BLOOD PRESSURE: 53 MMHG

## 2018-11-19 DIAGNOSIS — Z98.89 OTHER SPECIFIED POSTPROCEDURAL STATES: Chronic | ICD-10-CM

## 2018-11-19 DIAGNOSIS — E21.3 HYPERPARATHYROIDISM, UNSPECIFIED: Chronic | ICD-10-CM

## 2018-11-19 DIAGNOSIS — K25.9 GASTRIC ULCER, UNSPECIFIED AS ACUTE OR CHRONIC, WITHOUT HEMORRHAGE OR PERFORATION: Chronic | ICD-10-CM

## 2018-11-19 DIAGNOSIS — J39.8 OTHER SPECIFIED DISEASES OF UPPER RESPIRATORY TRACT: Chronic | ICD-10-CM

## 2018-11-19 DIAGNOSIS — J39.8 OTHER SPECIFIED DISEASES OF UPPER RESPIRATORY TRACT: ICD-10-CM

## 2018-11-19 DIAGNOSIS — Z98.890 OTHER SPECIFIED POSTPROCEDURAL STATES: Chronic | ICD-10-CM

## 2018-11-19 PROCEDURE — 31622 DX BRONCHOSCOPE/WASH: CPT

## 2018-11-19 NOTE — ASU PATIENT PROFILE, ADULT - MEDICATIONS BROUGHT TO HOSPITAL, PROFILE
Health Maintenance Due   Topic Date Due    ZOSTER VACCINE AGE 60>  11/07/1998    Pneumococcal 65+ Low/Medium Risk (2 of 2 - PCV13) 10/29/2015    EYE EXAM RETINAL OR DILATED Q1  02/14/2016    GLAUCOMA SCREENING Q2Y  02/14/2017    INFLUENZA AGE 9 TO ADULT  08/01/2017    MICROALBUMIN Q1  10/10/2017    LIPID PANEL Q1  10/10/2017       Chief Complaint   Patient presents with    Hypertension    Diabetes    Cholesterol Problem       1. Have you been to the ER, urgent care clinic since your last visit? Hospitalized since your last visit? No    2. Have you seen or consulted any other health care providers outside of the [facility] since your last visit? Include any pap smears or colon screening. No    3) Do you have an Advance Directive on file? no    4) Are you interested in receiving information on Advance Directives? NO      Patient is accompanied by self I have received verbal consent from Sony Adams to discuss any/all medical information while they are present in the room. no

## 2018-11-19 NOTE — ASU DISCHARGE PLAN (ADULT/PEDIATRIC). - INSTRUCTIONS
No need for further bronchoscopy.    Please feel free to call at anytime if you have any questions or concerns.

## 2018-11-19 NOTE — BRIEF OPERATIVE NOTE - PROCEDURE
Bronchoscopy, adult  11/19/2018    Active  NIHARIKAUMMERS <<-----Click on this checkbox to enter Procedure

## 2018-11-19 NOTE — ASU DISCHARGE PLAN (ADULT/PEDIATRIC). - NOTIFY
Shortness of breath Bleeding that does not stop/Fever greater than 101/Persistent Nausea and Vomiting/Shortness of breath

## 2018-11-19 NOTE — ASU DISCHARGE PLAN (ADULT/PEDIATRIC). - MEDICATION SUMMARY - MEDICATIONS TO TAKE
I will START or STAY ON the medications listed below when I get home from the hospital:    cefuroxime 250 mg oral tablet  -- 1 tab(s) by mouth 2 times a day   -- Finish all this medication unless otherwise directed by prescriber.  Medication should be taken with plenty of water.  Take with food or milk.    -- Indication: For Anti infective    levothyroxine 50 mcg (0.05 mg) oral tablet  -- 1 tab(s) by mouth once a day  -- Indication: For Hypothyroid    Centrum oral tablet  -- 1 tab(s) by mouth once a day. last dose 11/12  -- Indication: For Supplement    B Complex 50  --   once a day  -- Indication: For Supplement    Vitamin D3 50,000 intl units oral capsule  --  by mouth once a week on fridays  -- Indication: For Supplement

## 2018-11-19 NOTE — BRIEF OPERATIVE NOTE - POST-OP DX
Tracheal stenosis  11/19/2018  Hx of tracheal stenosis s/p Tracheal resection/reconstruction.  Active  Zavala, Christopher M

## 2019-06-25 ENCOUNTER — TRANSCRIPTION ENCOUNTER (OUTPATIENT)
Age: 59
End: 2019-06-25

## 2020-01-01 ENCOUNTER — TRANSCRIPTION ENCOUNTER (OUTPATIENT)
Age: 60
End: 2020-01-01

## 2020-01-01 ENCOUNTER — INPATIENT (INPATIENT)
Facility: HOSPITAL | Age: 60
LOS: 14 days | DRG: 177 | End: 2020-12-16
Attending: INTERNAL MEDICINE | Admitting: INTERNAL MEDICINE
Payer: COMMERCIAL

## 2020-01-01 VITALS
SYSTOLIC BLOOD PRESSURE: 161 MMHG | DIASTOLIC BLOOD PRESSURE: 83 MMHG | HEIGHT: 64 IN | RESPIRATION RATE: 20 BRPM | HEART RATE: 91 BPM | TEMPERATURE: 100 F | WEIGHT: 188.94 LBS | OXYGEN SATURATION: 98 %

## 2020-01-01 DIAGNOSIS — Z29.9 ENCOUNTER FOR PROPHYLACTIC MEASURES, UNSPECIFIED: ICD-10-CM

## 2020-01-01 DIAGNOSIS — J18.9 PNEUMONIA, UNSPECIFIED ORGANISM: ICD-10-CM

## 2020-01-01 DIAGNOSIS — J96.01 ACUTE RESPIRATORY FAILURE WITH HYPOXIA: ICD-10-CM

## 2020-01-01 DIAGNOSIS — R53.81 OTHER MALAISE: ICD-10-CM

## 2020-01-01 DIAGNOSIS — U07.1 COVID-19: ICD-10-CM

## 2020-01-01 DIAGNOSIS — Z51.5 ENCOUNTER FOR PALLIATIVE CARE: ICD-10-CM

## 2020-01-01 DIAGNOSIS — E21.3 HYPERPARATHYROIDISM, UNSPECIFIED: Chronic | ICD-10-CM

## 2020-01-01 DIAGNOSIS — J39.8 OTHER SPECIFIED DISEASES OF UPPER RESPIRATORY TRACT: Chronic | ICD-10-CM

## 2020-01-01 DIAGNOSIS — Z87.09 PERSONAL HISTORY OF OTHER DISEASES OF THE RESPIRATORY SYSTEM: ICD-10-CM

## 2020-01-01 DIAGNOSIS — K25.9 GASTRIC ULCER, UNSPECIFIED AS ACUTE OR CHRONIC, WITHOUT HEMORRHAGE OR PERFORATION: ICD-10-CM

## 2020-01-01 DIAGNOSIS — Z98.89 OTHER SPECIFIED POSTPROCEDURAL STATES: Chronic | ICD-10-CM

## 2020-01-01 DIAGNOSIS — K25.9 GASTRIC ULCER, UNSPECIFIED AS ACUTE OR CHRONIC, WITHOUT HEMORRHAGE OR PERFORATION: Chronic | ICD-10-CM

## 2020-01-01 DIAGNOSIS — E83.52 HYPERCALCEMIA: ICD-10-CM

## 2020-01-01 DIAGNOSIS — I50.9 HEART FAILURE, UNSPECIFIED: ICD-10-CM

## 2020-01-01 DIAGNOSIS — Z98.890 OTHER SPECIFIED POSTPROCEDURAL STATES: Chronic | ICD-10-CM

## 2020-01-01 DIAGNOSIS — A41.9 SEPSIS, UNSPECIFIED ORGANISM: ICD-10-CM

## 2020-01-01 DIAGNOSIS — G47.30 SLEEP APNEA, UNSPECIFIED: ICD-10-CM

## 2020-01-01 DIAGNOSIS — E03.9 HYPOTHYROIDISM, UNSPECIFIED: ICD-10-CM

## 2020-01-01 LAB
24R-OH-CALCIDIOL SERPL-MCNC: 41 NG/ML — SIGNIFICANT CHANGE UP (ref 30–80)
4/8 RATIO: 2.42 RATIO — SIGNIFICANT CHANGE UP (ref 0.9–3.6)
A1C WITH ESTIMATED AVERAGE GLUCOSE RESULT: 5.8 % — HIGH (ref 4–5.6)
ABS CD8: 112 /UL — LOW (ref 142–740)
ACE SERPL-CCNC: 19 U/L — SIGNIFICANT CHANGE UP (ref 14–82)
ALBUMIN SERPL ELPH-MCNC: 1.6 G/DL — LOW (ref 3.5–5)
ALBUMIN SERPL ELPH-MCNC: 1.9 G/DL — LOW (ref 3.5–5)
ALBUMIN SERPL ELPH-MCNC: 2 G/DL — LOW (ref 3.5–5)
ALBUMIN SERPL ELPH-MCNC: 2.1 G/DL — LOW (ref 3.5–5)
ALBUMIN SERPL ELPH-MCNC: 2.2 G/DL — LOW (ref 3.5–5)
ALBUMIN SERPL ELPH-MCNC: 2.3 G/DL — LOW (ref 3.5–5)
ALBUMIN SERPL ELPH-MCNC: 2.3 G/DL — LOW (ref 3.5–5)
ALBUMIN SERPL ELPH-MCNC: 2.4 G/DL — LOW (ref 3.5–5)
ALBUMIN SERPL ELPH-MCNC: 2.4 G/DL — LOW (ref 3.5–5)
ALBUMIN SERPL ELPH-MCNC: 2.6 G/DL — LOW (ref 3.5–5)
ALBUMIN SERPL ELPH-MCNC: 2.9 G/DL — LOW (ref 3.5–5)
ALP SERPL-CCNC: 129 U/L — HIGH (ref 40–120)
ALP SERPL-CCNC: 138 U/L — HIGH (ref 40–120)
ALP SERPL-CCNC: 158 U/L — HIGH (ref 40–120)
ALP SERPL-CCNC: 178 U/L — HIGH (ref 40–120)
ALP SERPL-CCNC: 193 U/L — HIGH (ref 40–120)
ALP SERPL-CCNC: 247 U/L — HIGH (ref 40–120)
ALP SERPL-CCNC: 248 U/L — HIGH (ref 40–120)
ALP SERPL-CCNC: 296 U/L — HIGH (ref 40–120)
ALP SERPL-CCNC: 306 U/L — HIGH (ref 40–120)
ALP SERPL-CCNC: 331 U/L — HIGH (ref 40–120)
ALP SERPL-CCNC: 342 U/L — HIGH (ref 40–120)
ALP SERPL-CCNC: 57 U/L — SIGNIFICANT CHANGE UP (ref 40–120)
ALP SERPL-CCNC: 62 U/L — SIGNIFICANT CHANGE UP (ref 40–120)
ALP SERPL-CCNC: 64 U/L — SIGNIFICANT CHANGE UP (ref 40–120)
ALP SERPL-CCNC: 66 U/L — SIGNIFICANT CHANGE UP (ref 40–120)
ALP SERPL-CCNC: 66 U/L — SIGNIFICANT CHANGE UP (ref 40–120)
ALP SERPL-CCNC: 68 U/L — SIGNIFICANT CHANGE UP (ref 40–120)
ALT FLD-CCNC: 105 U/L DA — HIGH (ref 10–60)
ALT FLD-CCNC: 123 U/L DA — HIGH (ref 10–60)
ALT FLD-CCNC: 165 U/L DA — HIGH (ref 10–60)
ALT FLD-CCNC: 177 U/L DA — HIGH (ref 10–60)
ALT FLD-CCNC: 272 U/L DA — HIGH (ref 10–60)
ALT FLD-CCNC: 30 U/L DA — SIGNIFICANT CHANGE UP (ref 10–60)
ALT FLD-CCNC: 35 U/L DA — SIGNIFICANT CHANGE UP (ref 10–60)
ALT FLD-CCNC: 37 U/L DA — SIGNIFICANT CHANGE UP (ref 10–60)
ALT FLD-CCNC: 398 U/L DA — HIGH (ref 10–60)
ALT FLD-CCNC: 40 U/L DA — SIGNIFICANT CHANGE UP (ref 10–60)
ALT FLD-CCNC: 44 U/L DA — SIGNIFICANT CHANGE UP (ref 10–60)
ALT FLD-CCNC: 46 U/L DA — SIGNIFICANT CHANGE UP (ref 10–60)
ALT FLD-CCNC: 520 U/L DA — HIGH (ref 10–60)
ALT FLD-CCNC: 536 U/L DA — HIGH (ref 10–60)
ALT FLD-CCNC: 58 U/L DA — SIGNIFICANT CHANGE UP (ref 10–60)
ALT FLD-CCNC: 73 U/L DA — HIGH (ref 10–60)
ALT FLD-CCNC: 99 U/L DA — HIGH (ref 10–60)
ANION GAP SERPL CALC-SCNC: 11 MMOL/L — SIGNIFICANT CHANGE UP (ref 5–17)
ANION GAP SERPL CALC-SCNC: 5 MMOL/L — SIGNIFICANT CHANGE UP (ref 5–17)
ANION GAP SERPL CALC-SCNC: 6 MMOL/L — SIGNIFICANT CHANGE UP (ref 5–17)
ANION GAP SERPL CALC-SCNC: 7 MMOL/L — SIGNIFICANT CHANGE UP (ref 5–17)
ANION GAP SERPL CALC-SCNC: 8 MMOL/L — SIGNIFICANT CHANGE UP (ref 5–17)
ANION GAP SERPL CALC-SCNC: 8 MMOL/L — SIGNIFICANT CHANGE UP (ref 5–17)
ANION GAP SERPL CALC-SCNC: 9 MMOL/L — SIGNIFICANT CHANGE UP (ref 5–17)
APTT BLD: 32.4 SEC — SIGNIFICANT CHANGE UP (ref 27.5–35.5)
AST SERPL-CCNC: 131 U/L — HIGH (ref 10–40)
AST SERPL-CCNC: 15 U/L — SIGNIFICANT CHANGE UP (ref 10–40)
AST SERPL-CCNC: 16 U/L — SIGNIFICANT CHANGE UP (ref 10–40)
AST SERPL-CCNC: 19 U/L — SIGNIFICANT CHANGE UP (ref 10–40)
AST SERPL-CCNC: 213 U/L — HIGH (ref 10–40)
AST SERPL-CCNC: 22 U/L — SIGNIFICANT CHANGE UP (ref 10–40)
AST SERPL-CCNC: 23 U/L — SIGNIFICANT CHANGE UP (ref 10–40)
AST SERPL-CCNC: 24 U/L — SIGNIFICANT CHANGE UP (ref 10–40)
AST SERPL-CCNC: 24 U/L — SIGNIFICANT CHANGE UP (ref 10–40)
AST SERPL-CCNC: 268 U/L — HIGH (ref 10–40)
AST SERPL-CCNC: 31 U/L — SIGNIFICANT CHANGE UP (ref 10–40)
AST SERPL-CCNC: 32 U/L — SIGNIFICANT CHANGE UP (ref 10–40)
AST SERPL-CCNC: 37 U/L — SIGNIFICANT CHANGE UP (ref 10–40)
AST SERPL-CCNC: 41 U/L — HIGH (ref 10–40)
AST SERPL-CCNC: 41 U/L — HIGH (ref 10–40)
AST SERPL-CCNC: 77 U/L — HIGH (ref 10–40)
AST SERPL-CCNC: 91 U/L — HIGH (ref 10–40)
BASE EXCESS BLDA CALC-SCNC: 1.6 MMOL/L — SIGNIFICANT CHANGE UP (ref -2–2)
BASE EXCESS BLDA CALC-SCNC: 1.6 MMOL/L — SIGNIFICANT CHANGE UP (ref -2–2)
BASE EXCESS BLDA CALC-SCNC: 3.1 MMOL/L — HIGH (ref -2–2)
BASE EXCESS BLDA CALC-SCNC: 4.8 MMOL/L — HIGH (ref -2–2)
BASE EXCESS BLDA CALC-SCNC: 6.1 MMOL/L — HIGH (ref -2–2)
BASE EXCESS BLDV CALC-SCNC: 4.7 MMOL/L — HIGH (ref -2–2)
BASOPHILS # BLD AUTO: 0 K/UL — SIGNIFICANT CHANGE UP (ref 0–0.2)
BASOPHILS # BLD AUTO: 0.01 K/UL — SIGNIFICANT CHANGE UP (ref 0–0.2)
BASOPHILS # BLD AUTO: 0.02 K/UL — SIGNIFICANT CHANGE UP (ref 0–0.2)
BASOPHILS # BLD AUTO: 0.03 K/UL — SIGNIFICANT CHANGE UP (ref 0–0.2)
BASOPHILS NFR BLD AUTO: 0 % — SIGNIFICANT CHANGE UP (ref 0–2)
BASOPHILS NFR BLD AUTO: 0.1 % — SIGNIFICANT CHANGE UP (ref 0–2)
BASOPHILS NFR BLD AUTO: 0.2 % — SIGNIFICANT CHANGE UP (ref 0–2)
BILIRUB DIRECT SERPL-MCNC: <0.1 MG/DL — SIGNIFICANT CHANGE UP (ref 0–0.2)
BILIRUB INDIRECT FLD-MCNC: >0.2 MG/DL — SIGNIFICANT CHANGE UP (ref 0.2–1)
BILIRUB SERPL-MCNC: 0.2 MG/DL — SIGNIFICANT CHANGE UP (ref 0.2–1.2)
BILIRUB SERPL-MCNC: 0.2 MG/DL — SIGNIFICANT CHANGE UP (ref 0.2–1.2)
BILIRUB SERPL-MCNC: 0.3 MG/DL — SIGNIFICANT CHANGE UP (ref 0.2–1.2)
BILIRUB SERPL-MCNC: 0.4 MG/DL — SIGNIFICANT CHANGE UP (ref 0.2–1.2)
BILIRUB SERPL-MCNC: 0.4 MG/DL — SIGNIFICANT CHANGE UP (ref 0.2–1.2)
BILIRUB SERPL-MCNC: 0.5 MG/DL — SIGNIFICANT CHANGE UP (ref 0.2–1.2)
BILIRUB SERPL-MCNC: 0.6 MG/DL — SIGNIFICANT CHANGE UP (ref 0.2–1.2)
BILIRUB SERPL-MCNC: 0.6 MG/DL — SIGNIFICANT CHANGE UP (ref 0.2–1.2)
BILIRUB SERPL-MCNC: 0.7 MG/DL — SIGNIFICANT CHANGE UP (ref 0.2–1.2)
BILIRUB SERPL-MCNC: 0.7 MG/DL — SIGNIFICANT CHANGE UP (ref 0.2–1.2)
BLD GP AB SCN SERPL QL: SIGNIFICANT CHANGE UP
BLOOD GAS COMMENTS ARTERIAL: SIGNIFICANT CHANGE UP
BLOOD GAS COMMENTS, VENOUS: SIGNIFICANT CHANGE UP
BUN SERPL-MCNC: 10 MG/DL — SIGNIFICANT CHANGE UP (ref 7–18)
BUN SERPL-MCNC: 13 MG/DL — SIGNIFICANT CHANGE UP (ref 7–18)
BUN SERPL-MCNC: 14 MG/DL — SIGNIFICANT CHANGE UP (ref 7–18)
BUN SERPL-MCNC: 14 MG/DL — SIGNIFICANT CHANGE UP (ref 7–18)
BUN SERPL-MCNC: 15 MG/DL — SIGNIFICANT CHANGE UP (ref 7–18)
BUN SERPL-MCNC: 16 MG/DL — SIGNIFICANT CHANGE UP (ref 7–18)
BUN SERPL-MCNC: 17 MG/DL — SIGNIFICANT CHANGE UP (ref 7–18)
BUN SERPL-MCNC: 17 MG/DL — SIGNIFICANT CHANGE UP (ref 7–18)
BUN SERPL-MCNC: 18 MG/DL — SIGNIFICANT CHANGE UP (ref 7–18)
BUN SERPL-MCNC: 18 MG/DL — SIGNIFICANT CHANGE UP (ref 7–18)
BUN SERPL-MCNC: 20 MG/DL — HIGH (ref 7–18)
BUN SERPL-MCNC: 21 MG/DL — HIGH (ref 7–18)
CALCIUM SERPL-MCNC: 10 MG/DL — SIGNIFICANT CHANGE UP (ref 8.4–10.5)
CALCIUM SERPL-MCNC: 10 MG/DL — SIGNIFICANT CHANGE UP (ref 8.4–10.5)
CALCIUM SERPL-MCNC: 10.1 MG/DL — SIGNIFICANT CHANGE UP (ref 8.4–10.5)
CALCIUM SERPL-MCNC: 10.3 MG/DL — SIGNIFICANT CHANGE UP (ref 8.4–10.5)
CALCIUM SERPL-MCNC: 10.3 MG/DL — SIGNIFICANT CHANGE UP (ref 8.4–10.5)
CALCIUM SERPL-MCNC: 10.4 MG/DL — SIGNIFICANT CHANGE UP (ref 8.4–10.5)
CALCIUM SERPL-MCNC: 10.5 MG/DL — SIGNIFICANT CHANGE UP (ref 8.4–10.5)
CALCIUM SERPL-MCNC: 10.6 MG/DL — HIGH (ref 8.4–10.5)
CALCIUM SERPL-MCNC: 10.8 MG/DL — HIGH (ref 8.4–10.5)
CALCIUM SERPL-MCNC: 10.9 MG/DL — HIGH (ref 8.4–10.5)
CALCIUM SERPL-MCNC: 11 MG/DL — HIGH (ref 8.4–10.5)
CALCIUM SERPL-MCNC: 11.1 MG/DL — HIGH (ref 8.4–10.5)
CALCIUM SERPL-MCNC: 11.4 MG/DL — HIGH (ref 8.4–10.5)
CALCIUM SERPL-MCNC: 9.3 MG/DL — SIGNIFICANT CHANGE UP (ref 8.4–10.5)
CD3 BLASTS SPEC-ACNC: 494 /UL — LOW (ref 672–1870)
CD3 BLASTS SPEC-ACNC: 66 % — SIGNIFICANT CHANGE UP (ref 59–83)
CD4 %: 36 % — SIGNIFICANT CHANGE UP (ref 30–62)
CD8 %: 15 % — SIGNIFICANT CHANGE UP (ref 12–36)
CHLORIDE SERPL-SCNC: 101 MMOL/L — SIGNIFICANT CHANGE UP (ref 96–108)
CHLORIDE SERPL-SCNC: 102 MMOL/L — SIGNIFICANT CHANGE UP (ref 96–108)
CHLORIDE SERPL-SCNC: 103 MMOL/L — SIGNIFICANT CHANGE UP (ref 96–108)
CHLORIDE SERPL-SCNC: 103 MMOL/L — SIGNIFICANT CHANGE UP (ref 96–108)
CHLORIDE SERPL-SCNC: 104 MMOL/L — SIGNIFICANT CHANGE UP (ref 96–108)
CHLORIDE SERPL-SCNC: 96 MMOL/L — SIGNIFICANT CHANGE UP (ref 96–108)
CHLORIDE SERPL-SCNC: 96 MMOL/L — SIGNIFICANT CHANGE UP (ref 96–108)
CHLORIDE SERPL-SCNC: 97 MMOL/L — SIGNIFICANT CHANGE UP (ref 96–108)
CHLORIDE SERPL-SCNC: 98 MMOL/L — SIGNIFICANT CHANGE UP (ref 96–108)
CHLORIDE SERPL-SCNC: 99 MMOL/L — SIGNIFICANT CHANGE UP (ref 96–108)
CHOLEST SERPL-MCNC: 124 MG/DL — SIGNIFICANT CHANGE UP
CK MB BLD-MCNC: <5.3 % — HIGH (ref 0–3.5)
CK MB CFR SERPL CALC: <1 NG/ML — SIGNIFICANT CHANGE UP (ref 0–3.6)
CK SERPL-CCNC: 19 U/L — LOW (ref 21–215)
CK SERPL-CCNC: 31 U/L — SIGNIFICANT CHANGE UP (ref 21–215)
CO2 SERPL-SCNC: 23 MMOL/L — SIGNIFICANT CHANGE UP (ref 22–31)
CO2 SERPL-SCNC: 25 MMOL/L — SIGNIFICANT CHANGE UP (ref 22–31)
CO2 SERPL-SCNC: 25 MMOL/L — SIGNIFICANT CHANGE UP (ref 22–31)
CO2 SERPL-SCNC: 26 MMOL/L — SIGNIFICANT CHANGE UP (ref 22–31)
CO2 SERPL-SCNC: 27 MMOL/L — SIGNIFICANT CHANGE UP (ref 22–31)
CO2 SERPL-SCNC: 27 MMOL/L — SIGNIFICANT CHANGE UP (ref 22–31)
CO2 SERPL-SCNC: 28 MMOL/L — SIGNIFICANT CHANGE UP (ref 22–31)
CO2 SERPL-SCNC: 29 MMOL/L — SIGNIFICANT CHANGE UP (ref 22–31)
CO2 SERPL-SCNC: 30 MMOL/L — SIGNIFICANT CHANGE UP (ref 22–31)
CREAT SERPL-MCNC: 0.44 MG/DL — LOW (ref 0.5–1.3)
CREAT SERPL-MCNC: 0.45 MG/DL — LOW (ref 0.5–1.3)
CREAT SERPL-MCNC: 0.5 MG/DL — SIGNIFICANT CHANGE UP (ref 0.5–1.3)
CREAT SERPL-MCNC: 0.51 MG/DL — SIGNIFICANT CHANGE UP (ref 0.5–1.3)
CREAT SERPL-MCNC: 0.54 MG/DL — SIGNIFICANT CHANGE UP (ref 0.5–1.3)
CREAT SERPL-MCNC: 0.55 MG/DL — SIGNIFICANT CHANGE UP (ref 0.5–1.3)
CREAT SERPL-MCNC: 0.56 MG/DL — SIGNIFICANT CHANGE UP (ref 0.5–1.3)
CREAT SERPL-MCNC: 0.58 MG/DL — SIGNIFICANT CHANGE UP (ref 0.5–1.3)
CREAT SERPL-MCNC: 0.6 MG/DL — SIGNIFICANT CHANGE UP (ref 0.5–1.3)
CREAT SERPL-MCNC: 0.61 MG/DL — SIGNIFICANT CHANGE UP (ref 0.5–1.3)
CREAT SERPL-MCNC: 0.62 MG/DL — SIGNIFICANT CHANGE UP (ref 0.5–1.3)
CREAT SERPL-MCNC: 0.64 MG/DL — SIGNIFICANT CHANGE UP (ref 0.5–1.3)
CREAT SERPL-MCNC: 0.66 MG/DL — SIGNIFICANT CHANGE UP (ref 0.5–1.3)
CREAT SERPL-MCNC: 0.66 MG/DL — SIGNIFICANT CHANGE UP (ref 0.5–1.3)
CREAT SERPL-MCNC: 0.71 MG/DL — SIGNIFICANT CHANGE UP (ref 0.5–1.3)
CREAT SERPL-MCNC: 0.73 MG/DL — SIGNIFICANT CHANGE UP (ref 0.5–1.3)
CREAT SERPL-MCNC: 0.74 MG/DL — SIGNIFICANT CHANGE UP (ref 0.5–1.3)
CRP SERPL-MCNC: 11.5 MG/DL — HIGH (ref 0–0.4)
CRP SERPL-MCNC: 13.2 MG/DL — HIGH (ref 0–0.4)
CRP SERPL-MCNC: 15.78 MG/DL — HIGH (ref 0–0.4)
CRP SERPL-MCNC: 21.76 MG/DL — HIGH (ref 0–0.4)
CRP SERPL-MCNC: 3.61 MG/DL — HIGH (ref 0–0.4)
CRP SERPL-MCNC: 6.35 MG/DL — HIGH (ref 0–0.4)
CRP SERPL-MCNC: 8.91 MG/DL — HIGH (ref 0–0.4)
D DIMER BLD IA.RAPID-MCNC: 468 NG/ML DDU — HIGH
D DIMER BLD IA.RAPID-MCNC: 624 NG/ML DDU — HIGH
D DIMER BLD IA.RAPID-MCNC: <150 NG/ML DDU — SIGNIFICANT CHANGE UP
D DIMER BLD IA.RAPID-MCNC: <150 NG/ML DDU — SIGNIFICANT CHANGE UP
EOSINOPHIL # BLD AUTO: 0 K/UL — SIGNIFICANT CHANGE UP (ref 0–0.5)
EOSINOPHIL # BLD AUTO: 0.01 K/UL — SIGNIFICANT CHANGE UP (ref 0–0.5)
EOSINOPHIL # BLD AUTO: 0.02 K/UL — SIGNIFICANT CHANGE UP (ref 0–0.5)
EOSINOPHIL # BLD AUTO: 0.05 K/UL — SIGNIFICANT CHANGE UP (ref 0–0.5)
EOSINOPHIL # BLD AUTO: 0.07 K/UL — SIGNIFICANT CHANGE UP (ref 0–0.5)
EOSINOPHIL # BLD AUTO: 0.3 K/UL — SIGNIFICANT CHANGE UP (ref 0–0.5)
EOSINOPHIL NFR BLD AUTO: 0 % — SIGNIFICANT CHANGE UP (ref 0–6)
EOSINOPHIL NFR BLD AUTO: 0.1 % — SIGNIFICANT CHANGE UP (ref 0–6)
EOSINOPHIL NFR BLD AUTO: 0.1 % — SIGNIFICANT CHANGE UP (ref 0–6)
EOSINOPHIL NFR BLD AUTO: 0.3 % — SIGNIFICANT CHANGE UP (ref 0–6)
EOSINOPHIL NFR BLD AUTO: 0.5 % — SIGNIFICANT CHANGE UP (ref 0–6)
EOSINOPHIL NFR BLD AUTO: 2.2 % — SIGNIFICANT CHANGE UP (ref 0–6)
ERYTHROCYTE [SEDIMENTATION RATE] IN BLOOD: 74 MM/HR — HIGH (ref 0–20)
ERYTHROCYTE [SEDIMENTATION RATE] IN BLOOD: 83 MM/HR — HIGH (ref 0–20)
ESTIMATED AVERAGE GLUCOSE: 120 MG/DL — HIGH (ref 68–114)
FERRITIN SERPL-MCNC: 1176 NG/ML — HIGH (ref 15–150)
FERRITIN SERPL-MCNC: 646 NG/ML — HIGH (ref 15–150)
FERRITIN SERPL-MCNC: 699 NG/ML — HIGH (ref 15–150)
FERRITIN SERPL-MCNC: 727 NG/ML — HIGH (ref 15–150)
FERRITIN SERPL-MCNC: 743 NG/ML — HIGH (ref 15–150)
FERRITIN SERPL-MCNC: 836 NG/ML — HIGH (ref 15–150)
GLUCOSE BLDC GLUCOMTR-MCNC: 100 MG/DL — HIGH (ref 70–99)
GLUCOSE BLDC GLUCOMTR-MCNC: 105 MG/DL — HIGH (ref 70–99)
GLUCOSE BLDC GLUCOMTR-MCNC: 109 MG/DL — HIGH (ref 70–99)
GLUCOSE BLDC GLUCOMTR-MCNC: 109 MG/DL — HIGH (ref 70–99)
GLUCOSE BLDC GLUCOMTR-MCNC: 111 MG/DL — HIGH (ref 70–99)
GLUCOSE BLDC GLUCOMTR-MCNC: 112 MG/DL — HIGH (ref 70–99)
GLUCOSE BLDC GLUCOMTR-MCNC: 112 MG/DL — HIGH (ref 70–99)
GLUCOSE BLDC GLUCOMTR-MCNC: 113 MG/DL — HIGH (ref 70–99)
GLUCOSE BLDC GLUCOMTR-MCNC: 117 MG/DL — HIGH (ref 70–99)
GLUCOSE BLDC GLUCOMTR-MCNC: 117 MG/DL — HIGH (ref 70–99)
GLUCOSE BLDC GLUCOMTR-MCNC: 120 MG/DL — HIGH (ref 70–99)
GLUCOSE BLDC GLUCOMTR-MCNC: 134 MG/DL — HIGH (ref 70–99)
GLUCOSE BLDC GLUCOMTR-MCNC: 139 MG/DL — HIGH (ref 70–99)
GLUCOSE BLDC GLUCOMTR-MCNC: 149 MG/DL — HIGH (ref 70–99)
GLUCOSE BLDC GLUCOMTR-MCNC: 167 MG/DL — HIGH (ref 70–99)
GLUCOSE BLDC GLUCOMTR-MCNC: 169 MG/DL — HIGH (ref 70–99)
GLUCOSE BLDC GLUCOMTR-MCNC: 172 MG/DL — HIGH (ref 70–99)
GLUCOSE BLDC GLUCOMTR-MCNC: 175 MG/DL — HIGH (ref 70–99)
GLUCOSE BLDC GLUCOMTR-MCNC: 177 MG/DL — HIGH (ref 70–99)
GLUCOSE BLDC GLUCOMTR-MCNC: 188 MG/DL — HIGH (ref 70–99)
GLUCOSE BLDC GLUCOMTR-MCNC: 198 MG/DL — HIGH (ref 70–99)
GLUCOSE BLDC GLUCOMTR-MCNC: 199 MG/DL — HIGH (ref 70–99)
GLUCOSE BLDC GLUCOMTR-MCNC: 221 MG/DL — HIGH (ref 70–99)
GLUCOSE BLDC GLUCOMTR-MCNC: 275 MG/DL — HIGH (ref 70–99)
GLUCOSE BLDC GLUCOMTR-MCNC: 280 MG/DL — HIGH (ref 70–99)
GLUCOSE BLDC GLUCOMTR-MCNC: 280 MG/DL — HIGH (ref 70–99)
GLUCOSE BLDC GLUCOMTR-MCNC: 324 MG/DL — HIGH (ref 70–99)
GLUCOSE BLDC GLUCOMTR-MCNC: 346 MG/DL — HIGH (ref 70–99)
GLUCOSE BLDC GLUCOMTR-MCNC: 390 MG/DL — HIGH (ref 70–99)
GLUCOSE BLDC GLUCOMTR-MCNC: 96 MG/DL — SIGNIFICANT CHANGE UP (ref 70–99)
GLUCOSE SERPL-MCNC: 102 MG/DL — HIGH (ref 70–99)
GLUCOSE SERPL-MCNC: 106 MG/DL — HIGH (ref 70–99)
GLUCOSE SERPL-MCNC: 112 MG/DL — HIGH (ref 70–99)
GLUCOSE SERPL-MCNC: 113 MG/DL — HIGH (ref 70–99)
GLUCOSE SERPL-MCNC: 127 MG/DL — HIGH (ref 70–99)
GLUCOSE SERPL-MCNC: 128 MG/DL — HIGH (ref 70–99)
GLUCOSE SERPL-MCNC: 141 MG/DL — HIGH (ref 70–99)
GLUCOSE SERPL-MCNC: 158 MG/DL — HIGH (ref 70–99)
GLUCOSE SERPL-MCNC: 159 MG/DL — HIGH (ref 70–99)
GLUCOSE SERPL-MCNC: 170 MG/DL — HIGH (ref 70–99)
GLUCOSE SERPL-MCNC: 179 MG/DL — HIGH (ref 70–99)
GLUCOSE SERPL-MCNC: 192 MG/DL — HIGH (ref 70–99)
GLUCOSE SERPL-MCNC: 193 MG/DL — HIGH (ref 70–99)
GLUCOSE SERPL-MCNC: 230 MG/DL — HIGH (ref 70–99)
GLUCOSE SERPL-MCNC: 231 MG/DL — HIGH (ref 70–99)
GLUCOSE SERPL-MCNC: 281 MG/DL — HIGH (ref 70–99)
GLUCOSE SERPL-MCNC: 285 MG/DL — HIGH (ref 70–99)
GLUCOSE SERPL-MCNC: 291 MG/DL — HIGH (ref 70–99)
HAV IGM SER-ACNC: SIGNIFICANT CHANGE UP
HBV CORE IGM SER-ACNC: SIGNIFICANT CHANGE UP
HBV SURFACE AB SER-ACNC: REACTIVE
HBV SURFACE AG SER-ACNC: SIGNIFICANT CHANGE UP
HCO3 BLDA-SCNC: 25 MMOL/L — SIGNIFICANT CHANGE UP (ref 23–27)
HCO3 BLDA-SCNC: 26 MMOL/L — SIGNIFICANT CHANGE UP (ref 23–27)
HCO3 BLDA-SCNC: 27 MMOL/L — SIGNIFICANT CHANGE UP (ref 23–27)
HCO3 BLDA-SCNC: 29 MMOL/L — HIGH (ref 23–27)
HCO3 BLDA-SCNC: 31 MMOL/L — HIGH (ref 23–27)
HCO3 BLDV-SCNC: 30 MMOL/L — HIGH (ref 21–29)
HCT VFR BLD CALC: 38.8 % — SIGNIFICANT CHANGE UP (ref 34.5–45)
HCT VFR BLD CALC: 39 % — SIGNIFICANT CHANGE UP (ref 34.5–45)
HCT VFR BLD CALC: 39.6 % — SIGNIFICANT CHANGE UP (ref 34.5–45)
HCT VFR BLD CALC: 40.1 % — SIGNIFICANT CHANGE UP (ref 34.5–45)
HCT VFR BLD CALC: 40.2 % — SIGNIFICANT CHANGE UP (ref 34.5–45)
HCT VFR BLD CALC: 40.3 % — SIGNIFICANT CHANGE UP (ref 34.5–45)
HCT VFR BLD CALC: 40.5 % — SIGNIFICANT CHANGE UP (ref 34.5–45)
HCT VFR BLD CALC: 40.9 % — SIGNIFICANT CHANGE UP (ref 34.5–45)
HCT VFR BLD CALC: 41.1 % — SIGNIFICANT CHANGE UP (ref 34.5–45)
HCT VFR BLD CALC: 41.4 % — SIGNIFICANT CHANGE UP (ref 34.5–45)
HCT VFR BLD CALC: 41.6 % — SIGNIFICANT CHANGE UP (ref 34.5–45)
HCT VFR BLD CALC: 41.7 % — SIGNIFICANT CHANGE UP (ref 34.5–45)
HCT VFR BLD CALC: 43.2 % — SIGNIFICANT CHANGE UP (ref 34.5–45)
HCT VFR BLD CALC: 44.4 % — SIGNIFICANT CHANGE UP (ref 34.5–45)
HCT VFR BLD CALC: 45.4 % — HIGH (ref 34.5–45)
HCT VFR BLD CALC: 46.4 % — HIGH (ref 34.5–45)
HCV AB S/CO SERPL IA: 0.1 S/CO — SIGNIFICANT CHANGE UP (ref 0–0.99)
HCV AB S/CO SERPL IA: 0.13 S/CO — SIGNIFICANT CHANGE UP (ref 0–0.99)
HCV AB SERPL-IMP: SIGNIFICANT CHANGE UP
HCV AB SERPL-IMP: SIGNIFICANT CHANGE UP
HDLC SERPL-MCNC: 43 MG/DL — LOW
HGB BLD-MCNC: 12.1 G/DL — SIGNIFICANT CHANGE UP (ref 11.5–15.5)
HGB BLD-MCNC: 12.3 G/DL — SIGNIFICANT CHANGE UP (ref 11.5–15.5)
HGB BLD-MCNC: 12.9 G/DL — SIGNIFICANT CHANGE UP (ref 11.5–15.5)
HGB BLD-MCNC: 13 G/DL — SIGNIFICANT CHANGE UP (ref 11.5–15.5)
HGB BLD-MCNC: 13 G/DL — SIGNIFICANT CHANGE UP (ref 11.5–15.5)
HGB BLD-MCNC: 13.1 G/DL — SIGNIFICANT CHANGE UP (ref 11.5–15.5)
HGB BLD-MCNC: 13.1 G/DL — SIGNIFICANT CHANGE UP (ref 11.5–15.5)
HGB BLD-MCNC: 13.2 G/DL — SIGNIFICANT CHANGE UP (ref 11.5–15.5)
HGB BLD-MCNC: 13.2 G/DL — SIGNIFICANT CHANGE UP (ref 11.5–15.5)
HGB BLD-MCNC: 13.3 G/DL — SIGNIFICANT CHANGE UP (ref 11.5–15.5)
HGB BLD-MCNC: 13.4 G/DL — SIGNIFICANT CHANGE UP (ref 11.5–15.5)
HGB BLD-MCNC: 13.7 G/DL — SIGNIFICANT CHANGE UP (ref 11.5–15.5)
HGB BLD-MCNC: 14 G/DL — SIGNIFICANT CHANGE UP (ref 11.5–15.5)
HGB BLD-MCNC: 14.3 G/DL — SIGNIFICANT CHANGE UP (ref 11.5–15.5)
HGB BLD-MCNC: 14.6 G/DL — SIGNIFICANT CHANGE UP (ref 11.5–15.5)
HGB BLD-MCNC: 14.8 G/DL — SIGNIFICANT CHANGE UP (ref 11.5–15.5)
HOROWITZ INDEX BLDA+IHG-RTO: 100 — SIGNIFICANT CHANGE UP
HOROWITZ INDEX BLDA+IHG-RTO: 80 — SIGNIFICANT CHANGE UP
HOROWITZ INDEX BLDV+IHG-RTO: 100 — SIGNIFICANT CHANGE UP
IMM GRANULOCYTES NFR BLD AUTO: 0.6 % — SIGNIFICANT CHANGE UP (ref 0–1.5)
IMM GRANULOCYTES NFR BLD AUTO: 0.7 % — SIGNIFICANT CHANGE UP (ref 0–1.5)
IMM GRANULOCYTES NFR BLD AUTO: 0.7 % — SIGNIFICANT CHANGE UP (ref 0–1.5)
IMM GRANULOCYTES NFR BLD AUTO: 0.9 % — SIGNIFICANT CHANGE UP (ref 0–1.5)
IMM GRANULOCYTES NFR BLD AUTO: 1 % — SIGNIFICANT CHANGE UP (ref 0–1.5)
IMM GRANULOCYTES NFR BLD AUTO: 1.1 % — SIGNIFICANT CHANGE UP (ref 0–1.5)
IMM GRANULOCYTES NFR BLD AUTO: 1.2 % — SIGNIFICANT CHANGE UP (ref 0–1.5)
IMM GRANULOCYTES NFR BLD AUTO: 1.5 % — SIGNIFICANT CHANGE UP (ref 0–1.5)
IMM GRANULOCYTES NFR BLD AUTO: 1.5 % — SIGNIFICANT CHANGE UP (ref 0–1.5)
INR BLD: 1.07 RATIO — SIGNIFICANT CHANGE UP (ref 0.88–1.16)
LDH SERPL L TO P-CCNC: 271 U/L — HIGH (ref 120–225)
LDH SERPL L TO P-CCNC: 290 U/L — HIGH (ref 120–225)
LIPID PNL WITH DIRECT LDL SERPL: 46 MG/DL — SIGNIFICANT CHANGE UP
LYMPHOCYTES # BLD AUTO: 0.57 K/UL — LOW (ref 1–3.3)
LYMPHOCYTES # BLD AUTO: 0.66 K/UL — LOW (ref 1–3.3)
LYMPHOCYTES # BLD AUTO: 0.69 K/UL — LOW (ref 1–3.3)
LYMPHOCYTES # BLD AUTO: 0.74 K/UL — LOW (ref 1–3.3)
LYMPHOCYTES # BLD AUTO: 0.75 K/UL — LOW (ref 1–3.3)
LYMPHOCYTES # BLD AUTO: 1.02 K/UL — SIGNIFICANT CHANGE UP (ref 1–3.3)
LYMPHOCYTES # BLD AUTO: 1.08 K/UL — SIGNIFICANT CHANGE UP (ref 1–3.3)
LYMPHOCYTES # BLD AUTO: 1.49 K/UL — SIGNIFICANT CHANGE UP (ref 1–3.3)
LYMPHOCYTES # BLD AUTO: 1.62 K/UL — SIGNIFICANT CHANGE UP (ref 1–3.3)
LYMPHOCYTES # BLD AUTO: 1.98 K/UL — SIGNIFICANT CHANGE UP (ref 1–3.3)
LYMPHOCYTES # BLD AUTO: 10 % — LOW (ref 13–44)
LYMPHOCYTES # BLD AUTO: 10.8 % — LOW (ref 13–44)
LYMPHOCYTES # BLD AUTO: 12.1 % — LOW (ref 13–44)
LYMPHOCYTES # BLD AUTO: 3.7 % — LOW (ref 13–44)
LYMPHOCYTES # BLD AUTO: 4.3 % — LOW (ref 13–44)
LYMPHOCYTES # BLD AUTO: 4.4 % — LOW (ref 13–44)
LYMPHOCYTES # BLD AUTO: 5.1 % — LOW (ref 13–44)
LYMPHOCYTES # BLD AUTO: 5.5 % — LOW (ref 13–44)
LYMPHOCYTES # BLD AUTO: 6.3 % — LOW (ref 13–44)
LYMPHOCYTES # BLD AUTO: 9.7 % — LOW (ref 13–44)
MAGNESIUM SERPL-MCNC: 2 MG/DL — SIGNIFICANT CHANGE UP (ref 1.6–2.6)
MAGNESIUM SERPL-MCNC: 2 MG/DL — SIGNIFICANT CHANGE UP (ref 1.6–2.6)
MAGNESIUM SERPL-MCNC: 2.1 MG/DL — SIGNIFICANT CHANGE UP (ref 1.6–2.6)
MAGNESIUM SERPL-MCNC: 2.2 MG/DL — SIGNIFICANT CHANGE UP (ref 1.6–2.6)
MAGNESIUM SERPL-MCNC: 2.3 MG/DL — SIGNIFICANT CHANGE UP (ref 1.6–2.6)
MAGNESIUM SERPL-MCNC: 2.3 MG/DL — SIGNIFICANT CHANGE UP (ref 1.6–2.6)
MAGNESIUM SERPL-MCNC: 2.4 MG/DL — SIGNIFICANT CHANGE UP (ref 1.6–2.6)
MAGNESIUM SERPL-MCNC: 2.5 MG/DL — SIGNIFICANT CHANGE UP (ref 1.6–2.6)
MAGNESIUM SERPL-MCNC: 2.5 MG/DL — SIGNIFICANT CHANGE UP (ref 1.6–2.6)
MANUAL SMEAR VERIFICATION: SIGNIFICANT CHANGE UP
MCHC RBC-ENTMCNC: 30.5 PG — SIGNIFICANT CHANGE UP (ref 27–34)
MCHC RBC-ENTMCNC: 30.5 PG — SIGNIFICANT CHANGE UP (ref 27–34)
MCHC RBC-ENTMCNC: 30.8 PG — SIGNIFICANT CHANGE UP (ref 27–34)
MCHC RBC-ENTMCNC: 30.9 PG — SIGNIFICANT CHANGE UP (ref 27–34)
MCHC RBC-ENTMCNC: 31 PG — SIGNIFICANT CHANGE UP (ref 27–34)
MCHC RBC-ENTMCNC: 31.1 PG — SIGNIFICANT CHANGE UP (ref 27–34)
MCHC RBC-ENTMCNC: 31.2 GM/DL — LOW (ref 32–36)
MCHC RBC-ENTMCNC: 31.2 PG — SIGNIFICANT CHANGE UP (ref 27–34)
MCHC RBC-ENTMCNC: 31.3 PG — SIGNIFICANT CHANGE UP (ref 27–34)
MCHC RBC-ENTMCNC: 31.4 PG — SIGNIFICANT CHANGE UP (ref 27–34)
MCHC RBC-ENTMCNC: 31.5 GM/DL — LOW (ref 32–36)
MCHC RBC-ENTMCNC: 31.5 PG — SIGNIFICANT CHANGE UP (ref 27–34)
MCHC RBC-ENTMCNC: 31.6 PG — SIGNIFICANT CHANGE UP (ref 27–34)
MCHC RBC-ENTMCNC: 31.7 GM/DL — LOW (ref 32–36)
MCHC RBC-ENTMCNC: 31.9 GM/DL — LOW (ref 32–36)
MCHC RBC-ENTMCNC: 32 GM/DL — SIGNIFICANT CHANGE UP (ref 32–36)
MCHC RBC-ENTMCNC: 32.1 GM/DL — SIGNIFICANT CHANGE UP (ref 32–36)
MCHC RBC-ENTMCNC: 32.2 GM/DL — SIGNIFICANT CHANGE UP (ref 32–36)
MCHC RBC-ENTMCNC: 32.2 GM/DL — SIGNIFICANT CHANGE UP (ref 32–36)
MCHC RBC-ENTMCNC: 32.3 GM/DL — SIGNIFICANT CHANGE UP (ref 32–36)
MCHC RBC-ENTMCNC: 32.4 GM/DL — SIGNIFICANT CHANGE UP (ref 32–36)
MCHC RBC-ENTMCNC: 32.5 GM/DL — SIGNIFICANT CHANGE UP (ref 32–36)
MCHC RBC-ENTMCNC: 32.6 GM/DL — SIGNIFICANT CHANGE UP (ref 32–36)
MCHC RBC-ENTMCNC: 32.6 GM/DL — SIGNIFICANT CHANGE UP (ref 32–36)
MCHC RBC-ENTMCNC: 33.1 GM/DL — SIGNIFICANT CHANGE UP (ref 32–36)
MCV RBC AUTO: 95.5 FL — SIGNIFICANT CHANGE UP (ref 80–100)
MCV RBC AUTO: 95.6 FL — SIGNIFICANT CHANGE UP (ref 80–100)
MCV RBC AUTO: 95.6 FL — SIGNIFICANT CHANGE UP (ref 80–100)
MCV RBC AUTO: 95.7 FL — SIGNIFICANT CHANGE UP (ref 80–100)
MCV RBC AUTO: 96 FL — SIGNIFICANT CHANGE UP (ref 80–100)
MCV RBC AUTO: 96.1 FL — SIGNIFICANT CHANGE UP (ref 80–100)
MCV RBC AUTO: 96.3 FL — SIGNIFICANT CHANGE UP (ref 80–100)
MCV RBC AUTO: 96.7 FL — SIGNIFICANT CHANGE UP (ref 80–100)
MCV RBC AUTO: 96.9 FL — SIGNIFICANT CHANGE UP (ref 80–100)
MCV RBC AUTO: 97 FL — SIGNIFICANT CHANGE UP (ref 80–100)
MCV RBC AUTO: 97.6 FL — SIGNIFICANT CHANGE UP (ref 80–100)
MCV RBC AUTO: 98.5 FL — SIGNIFICANT CHANGE UP (ref 80–100)
MCV RBC AUTO: 99.2 FL — SIGNIFICANT CHANGE UP (ref 80–100)
MONOCYTES # BLD AUTO: 0.32 K/UL — SIGNIFICANT CHANGE UP (ref 0–0.9)
MONOCYTES # BLD AUTO: 0.36 K/UL — SIGNIFICANT CHANGE UP (ref 0–0.9)
MONOCYTES # BLD AUTO: 0.38 K/UL — SIGNIFICANT CHANGE UP (ref 0–0.9)
MONOCYTES # BLD AUTO: 0.41 K/UL — SIGNIFICANT CHANGE UP (ref 0–0.9)
MONOCYTES # BLD AUTO: 0.45 K/UL — SIGNIFICANT CHANGE UP (ref 0–0.9)
MONOCYTES # BLD AUTO: 0.47 K/UL — SIGNIFICANT CHANGE UP (ref 0–0.9)
MONOCYTES # BLD AUTO: 0.52 K/UL — SIGNIFICANT CHANGE UP (ref 0–0.9)
MONOCYTES # BLD AUTO: 0.57 K/UL — SIGNIFICANT CHANGE UP (ref 0–0.9)
MONOCYTES # BLD AUTO: 0.71 K/UL — SIGNIFICANT CHANGE UP (ref 0–0.9)
MONOCYTES # BLD AUTO: 0.79 K/UL — SIGNIFICANT CHANGE UP (ref 0–0.9)
MONOCYTES NFR BLD AUTO: 2.1 % — SIGNIFICANT CHANGE UP (ref 2–14)
MONOCYTES NFR BLD AUTO: 2.7 % — SIGNIFICANT CHANGE UP (ref 2–14)
MONOCYTES NFR BLD AUTO: 2.8 % — SIGNIFICANT CHANGE UP (ref 2–14)
MONOCYTES NFR BLD AUTO: 3.2 % — SIGNIFICANT CHANGE UP (ref 2–14)
MONOCYTES NFR BLD AUTO: 3.7 % — SIGNIFICANT CHANGE UP (ref 2–14)
MONOCYTES NFR BLD AUTO: 4 % — SIGNIFICANT CHANGE UP (ref 2–14)
MONOCYTES NFR BLD AUTO: 4.6 % — SIGNIFICANT CHANGE UP (ref 2–14)
MONOCYTES NFR BLD AUTO: 4.7 % — SIGNIFICANT CHANGE UP (ref 2–14)
MRSA PCR RESULT.: SIGNIFICANT CHANGE UP
NEUTROPHILS # BLD AUTO: 10.09 K/UL — HIGH (ref 1.8–7.4)
NEUTROPHILS # BLD AUTO: 12.05 K/UL — HIGH (ref 1.8–7.4)
NEUTROPHILS # BLD AUTO: 12.19 K/UL — HIGH (ref 1.8–7.4)
NEUTROPHILS # BLD AUTO: 12.43 K/UL — HIGH (ref 1.8–7.4)
NEUTROPHILS # BLD AUTO: 13.23 K/UL — HIGH (ref 1.8–7.4)
NEUTROPHILS # BLD AUTO: 13.72 K/UL — HIGH (ref 1.8–7.4)
NEUTROPHILS # BLD AUTO: 14.56 K/UL — HIGH (ref 1.8–7.4)
NEUTROPHILS # BLD AUTO: 17.06 K/UL — HIGH (ref 1.8–7.4)
NEUTROPHILS # BLD AUTO: 17.1 K/UL — HIGH (ref 1.8–7.4)
NEUTROPHILS # BLD AUTO: 9.55 K/UL — HIGH (ref 1.8–7.4)
NEUTROPHILS NFR BLD AUTO: 81.7 % — HIGH (ref 43–77)
NEUTROPHILS NFR BLD AUTO: 82.9 % — HIGH (ref 43–77)
NEUTROPHILS NFR BLD AUTO: 85.4 % — HIGH (ref 43–77)
NEUTROPHILS NFR BLD AUTO: 86 % — HIGH (ref 43–77)
NEUTROPHILS NFR BLD AUTO: 88.8 % — HIGH (ref 43–77)
NEUTROPHILS NFR BLD AUTO: 89.6 % — HIGH (ref 43–77)
NEUTROPHILS NFR BLD AUTO: 90.5 % — HIGH (ref 43–77)
NEUTROPHILS NFR BLD AUTO: 91.7 % — HIGH (ref 43–77)
NEUTROPHILS NFR BLD AUTO: 92.1 % — HIGH (ref 43–77)
NEUTROPHILS NFR BLD AUTO: 92.6 % — HIGH (ref 43–77)
NON HDL CHOLESTEROL: 81 MG/DL — SIGNIFICANT CHANGE UP
NRBC # BLD: 0 /100 WBCS — SIGNIFICANT CHANGE UP (ref 0–0)
NRBC # BLD: 0 /100 — SIGNIFICANT CHANGE UP (ref 0–0)
PCO2 BLDA: 37 MMHG — SIGNIFICANT CHANGE UP (ref 32–46)
PCO2 BLDA: 38 MMHG — SIGNIFICANT CHANGE UP (ref 32–46)
PCO2 BLDA: 43 MMHG — SIGNIFICANT CHANGE UP (ref 32–46)
PCO2 BLDA: 44 MMHG — SIGNIFICANT CHANGE UP (ref 32–46)
PCO2 BLDA: 49 MMHG — HIGH (ref 32–46)
PCO2 BLDV: 50 MMHG — SIGNIFICANT CHANGE UP (ref 35–50)
PH BLDA: 7.4 — SIGNIFICANT CHANGE UP (ref 7.35–7.45)
PH BLDA: 7.42 — SIGNIFICANT CHANGE UP (ref 7.35–7.45)
PH BLDA: 7.43 — SIGNIFICANT CHANGE UP (ref 7.35–7.45)
PH BLDA: 7.44 — SIGNIFICANT CHANGE UP (ref 7.35–7.45)
PH BLDA: 7.45 — SIGNIFICANT CHANGE UP (ref 7.35–7.45)
PH BLDV: 7.4 — SIGNIFICANT CHANGE UP (ref 7.35–7.45)
PHOSPHATE SERPL-MCNC: 2 MG/DL — LOW (ref 2.5–4.5)
PHOSPHATE SERPL-MCNC: 2.1 MG/DL — LOW (ref 2.5–4.5)
PHOSPHATE SERPL-MCNC: 2.2 MG/DL — LOW (ref 2.5–4.5)
PHOSPHATE SERPL-MCNC: 2.2 MG/DL — LOW (ref 2.5–4.5)
PHOSPHATE SERPL-MCNC: 2.3 MG/DL — LOW (ref 2.5–4.5)
PHOSPHATE SERPL-MCNC: 2.4 MG/DL — LOW (ref 2.5–4.5)
PHOSPHATE SERPL-MCNC: 2.5 MG/DL — SIGNIFICANT CHANGE UP (ref 2.5–4.5)
PHOSPHATE SERPL-MCNC: 2.6 MG/DL — SIGNIFICANT CHANGE UP (ref 2.5–4.5)
PHOSPHATE SERPL-MCNC: 3.1 MG/DL — SIGNIFICANT CHANGE UP (ref 2.5–4.5)
PHOSPHATE SERPL-MCNC: 3.3 MG/DL — SIGNIFICANT CHANGE UP (ref 2.5–4.5)
PLAT MORPH BLD: NORMAL — SIGNIFICANT CHANGE UP
PLATELET # BLD AUTO: 321 K/UL — SIGNIFICANT CHANGE UP (ref 150–400)
PLATELET # BLD AUTO: 329 K/UL — SIGNIFICANT CHANGE UP (ref 150–400)
PLATELET # BLD AUTO: 348 K/UL — SIGNIFICANT CHANGE UP (ref 150–400)
PLATELET # BLD AUTO: 351 K/UL — SIGNIFICANT CHANGE UP (ref 150–400)
PLATELET # BLD AUTO: 377 K/UL — SIGNIFICANT CHANGE UP (ref 150–400)
PLATELET # BLD AUTO: 378 K/UL — SIGNIFICANT CHANGE UP (ref 150–400)
PLATELET # BLD AUTO: 385 K/UL — SIGNIFICANT CHANGE UP (ref 150–400)
PLATELET # BLD AUTO: 386 K/UL — SIGNIFICANT CHANGE UP (ref 150–400)
PLATELET # BLD AUTO: 393 K/UL — SIGNIFICANT CHANGE UP (ref 150–400)
PLATELET # BLD AUTO: 403 K/UL — HIGH (ref 150–400)
PLATELET # BLD AUTO: 406 K/UL — HIGH (ref 150–400)
PLATELET # BLD AUTO: 413 K/UL — HIGH (ref 150–400)
PLATELET # BLD AUTO: 420 K/UL — HIGH (ref 150–400)
PLATELET # BLD AUTO: 459 K/UL — HIGH (ref 150–400)
PLATELET # BLD AUTO: 483 K/UL — HIGH (ref 150–400)
PLATELET # BLD AUTO: 493 K/UL — HIGH (ref 150–400)
PO2 BLDA: 55 MMHG — LOW (ref 74–108)
PO2 BLDA: 55 MMHG — LOW (ref 74–108)
PO2 BLDA: 58 MMHG — LOW (ref 74–108)
PO2 BLDA: 62 MMHG — LOW (ref 74–108)
PO2 BLDA: 66 MMHG — LOW (ref 74–108)
PO2 BLDV: 33 MMHG — SIGNIFICANT CHANGE UP (ref 25–45)
POTASSIUM SERPL-MCNC: 3.9 MMOL/L — SIGNIFICANT CHANGE UP (ref 3.5–5.3)
POTASSIUM SERPL-MCNC: 4 MMOL/L — SIGNIFICANT CHANGE UP (ref 3.5–5.3)
POTASSIUM SERPL-MCNC: 4 MMOL/L — SIGNIFICANT CHANGE UP (ref 3.5–5.3)
POTASSIUM SERPL-MCNC: 4.1 MMOL/L — SIGNIFICANT CHANGE UP (ref 3.5–5.3)
POTASSIUM SERPL-MCNC: 4.2 MMOL/L — SIGNIFICANT CHANGE UP (ref 3.5–5.3)
POTASSIUM SERPL-MCNC: 4.2 MMOL/L — SIGNIFICANT CHANGE UP (ref 3.5–5.3)
POTASSIUM SERPL-MCNC: 4.3 MMOL/L — SIGNIFICANT CHANGE UP (ref 3.5–5.3)
POTASSIUM SERPL-MCNC: 4.4 MMOL/L — SIGNIFICANT CHANGE UP (ref 3.5–5.3)
POTASSIUM SERPL-MCNC: 4.4 MMOL/L — SIGNIFICANT CHANGE UP (ref 3.5–5.3)
POTASSIUM SERPL-MCNC: 4.5 MMOL/L — SIGNIFICANT CHANGE UP (ref 3.5–5.3)
POTASSIUM SERPL-MCNC: 4.6 MMOL/L — SIGNIFICANT CHANGE UP (ref 3.5–5.3)
POTASSIUM SERPL-MCNC: 4.9 MMOL/L — SIGNIFICANT CHANGE UP (ref 3.5–5.3)
POTASSIUM SERPL-MCNC: 5 MMOL/L — SIGNIFICANT CHANGE UP (ref 3.5–5.3)
POTASSIUM SERPL-MCNC: 5.3 MMOL/L — SIGNIFICANT CHANGE UP (ref 3.5–5.3)
POTASSIUM SERPL-MCNC: 6.4 MMOL/L — CRITICAL HIGH (ref 3.5–5.3)
POTASSIUM SERPL-MCNC: 6.5 MMOL/L — CRITICAL HIGH (ref 3.5–5.3)
POTASSIUM SERPL-SCNC: 3.9 MMOL/L — SIGNIFICANT CHANGE UP (ref 3.5–5.3)
POTASSIUM SERPL-SCNC: 4 MMOL/L — SIGNIFICANT CHANGE UP (ref 3.5–5.3)
POTASSIUM SERPL-SCNC: 4 MMOL/L — SIGNIFICANT CHANGE UP (ref 3.5–5.3)
POTASSIUM SERPL-SCNC: 4.1 MMOL/L — SIGNIFICANT CHANGE UP (ref 3.5–5.3)
POTASSIUM SERPL-SCNC: 4.2 MMOL/L — SIGNIFICANT CHANGE UP (ref 3.5–5.3)
POTASSIUM SERPL-SCNC: 4.2 MMOL/L — SIGNIFICANT CHANGE UP (ref 3.5–5.3)
POTASSIUM SERPL-SCNC: 4.3 MMOL/L — SIGNIFICANT CHANGE UP (ref 3.5–5.3)
POTASSIUM SERPL-SCNC: 4.4 MMOL/L — SIGNIFICANT CHANGE UP (ref 3.5–5.3)
POTASSIUM SERPL-SCNC: 4.4 MMOL/L — SIGNIFICANT CHANGE UP (ref 3.5–5.3)
POTASSIUM SERPL-SCNC: 4.5 MMOL/L — SIGNIFICANT CHANGE UP (ref 3.5–5.3)
POTASSIUM SERPL-SCNC: 4.6 MMOL/L — SIGNIFICANT CHANGE UP (ref 3.5–5.3)
POTASSIUM SERPL-SCNC: 4.9 MMOL/L — SIGNIFICANT CHANGE UP (ref 3.5–5.3)
POTASSIUM SERPL-SCNC: 5 MMOL/L — SIGNIFICANT CHANGE UP (ref 3.5–5.3)
POTASSIUM SERPL-SCNC: 5.3 MMOL/L — SIGNIFICANT CHANGE UP (ref 3.5–5.3)
POTASSIUM SERPL-SCNC: 6.4 MMOL/L — CRITICAL HIGH (ref 3.5–5.3)
POTASSIUM SERPL-SCNC: 6.5 MMOL/L — CRITICAL HIGH (ref 3.5–5.3)
PROCALCITONIN SERPL-MCNC: 0.07 NG/ML — SIGNIFICANT CHANGE UP (ref 0.02–0.1)
PROCALCITONIN SERPL-MCNC: 0.09 NG/ML — SIGNIFICANT CHANGE UP (ref 0.02–0.1)
PROCALCITONIN SERPL-MCNC: 0.1 NG/ML — SIGNIFICANT CHANGE UP (ref 0.02–0.1)
PROCALCITONIN SERPL-MCNC: 0.25 NG/ML — HIGH (ref 0.02–0.1)
PROT SERPL-MCNC: 6.5 G/DL — SIGNIFICANT CHANGE UP (ref 6–8.3)
PROT SERPL-MCNC: 6.6 G/DL — SIGNIFICANT CHANGE UP (ref 6–8.3)
PROT SERPL-MCNC: 6.6 G/DL — SIGNIFICANT CHANGE UP (ref 6–8.3)
PROT SERPL-MCNC: 6.7 G/DL — SIGNIFICANT CHANGE UP (ref 6–8.3)
PROT SERPL-MCNC: 6.8 G/DL — SIGNIFICANT CHANGE UP (ref 6–8.3)
PROT SERPL-MCNC: 6.9 G/DL — SIGNIFICANT CHANGE UP (ref 6–8.3)
PROT SERPL-MCNC: 7 G/DL — SIGNIFICANT CHANGE UP (ref 6–8.3)
PROT SERPL-MCNC: 7.2 G/DL — SIGNIFICANT CHANGE UP (ref 6–8.3)
PROT SERPL-MCNC: 7.3 G/DL — SIGNIFICANT CHANGE UP (ref 6–8.3)
PROT SERPL-MCNC: 7.7 G/DL — SIGNIFICANT CHANGE UP (ref 6–8.3)
PROT SERPL-MCNC: 7.8 G/DL — SIGNIFICANT CHANGE UP (ref 6–8.3)
PROT SERPL-MCNC: 7.9 G/DL — SIGNIFICANT CHANGE UP (ref 6–8.3)
PROT SERPL-MCNC: 8 G/DL — SIGNIFICANT CHANGE UP (ref 6–8.3)
PROTHROM AB SERPL-ACNC: 12.7 SEC — SIGNIFICANT CHANGE UP (ref 10.6–13.6)
PTH-INTACT FLD-MCNC: 78 PG/ML — HIGH (ref 15–65)
PTH-INTACT FLD-MCNC: 82 PG/ML — HIGH (ref 15–65)
RAPID RVP RESULT: DETECTED
RAPID RVP RESULT: SIGNIFICANT CHANGE UP
RBC # BLD: 3.91 M/UL — SIGNIFICANT CHANGE UP (ref 3.8–5.2)
RBC # BLD: 3.96 M/UL — SIGNIFICANT CHANGE UP (ref 3.8–5.2)
RBC # BLD: 4.14 M/UL — SIGNIFICANT CHANGE UP (ref 3.8–5.2)
RBC # BLD: 4.16 M/UL — SIGNIFICANT CHANGE UP (ref 3.8–5.2)
RBC # BLD: 4.19 M/UL — SIGNIFICANT CHANGE UP (ref 3.8–5.2)
RBC # BLD: 4.2 M/UL — SIGNIFICANT CHANGE UP (ref 3.8–5.2)
RBC # BLD: 4.23 M/UL — SIGNIFICANT CHANGE UP (ref 3.8–5.2)
RBC # BLD: 4.25 M/UL — SIGNIFICANT CHANGE UP (ref 3.8–5.2)
RBC # BLD: 4.26 M/UL — SIGNIFICANT CHANGE UP (ref 3.8–5.2)
RBC # BLD: 4.3 M/UL — SIGNIFICANT CHANGE UP (ref 3.8–5.2)
RBC # BLD: 4.33 M/UL — SIGNIFICANT CHANGE UP (ref 3.8–5.2)
RBC # BLD: 4.33 M/UL — SIGNIFICANT CHANGE UP (ref 3.8–5.2)
RBC # BLD: 4.52 M/UL — SIGNIFICANT CHANGE UP (ref 3.8–5.2)
RBC # BLD: 4.61 M/UL — SIGNIFICANT CHANGE UP (ref 3.8–5.2)
RBC # BLD: 4.65 M/UL — SIGNIFICANT CHANGE UP (ref 3.8–5.2)
RBC # BLD: 4.86 M/UL — SIGNIFICANT CHANGE UP (ref 3.8–5.2)
RBC # FLD: 11.6 % — SIGNIFICANT CHANGE UP (ref 10.3–14.5)
RBC # FLD: 11.7 % — SIGNIFICANT CHANGE UP (ref 10.3–14.5)
RBC # FLD: 11.8 % — SIGNIFICANT CHANGE UP (ref 10.3–14.5)
RBC # FLD: 11.8 % — SIGNIFICANT CHANGE UP (ref 10.3–14.5)
RBC # FLD: 11.9 % — SIGNIFICANT CHANGE UP (ref 10.3–14.5)
RBC # FLD: 12.2 % — SIGNIFICANT CHANGE UP (ref 10.3–14.5)
RBC # FLD: 12.3 % — SIGNIFICANT CHANGE UP (ref 10.3–14.5)
RBC BLD AUTO: NORMAL — SIGNIFICANT CHANGE UP
S AUREUS DNA NOSE QL NAA+PROBE: SIGNIFICANT CHANGE UP
SAO2 % BLDA: 87 % — LOW (ref 92–96)
SAO2 % BLDA: 88 % — LOW (ref 92–96)
SAO2 % BLDA: 90 % — LOW (ref 92–96)
SAO2 % BLDA: 92 % — SIGNIFICANT CHANGE UP (ref 92–96)
SAO2 % BLDA: 92 % — SIGNIFICANT CHANGE UP (ref 92–96)
SAO2 % BLDV: 58 % — LOW (ref 67–88)
SARS-COV-2 IGG SERPL IA-ACNC: 2 RATIO — HIGH
SARS-COV-2 IGG SERPL QL IA: NEGATIVE — SIGNIFICANT CHANGE UP
SARS-COV-2 IGG SERPL QL IA: NEGATIVE — SIGNIFICANT CHANGE UP
SARS-COV-2 IGG SERPL QL IA: POSITIVE
SARS-COV-2 IGM SERPL IA-ACNC: 0.28 RATIO — SIGNIFICANT CHANGE UP
SARS-COV-2 IGM SERPL IA-ACNC: <0.1 INDEX — SIGNIFICANT CHANGE UP
SARS-COV-2 RNA SPEC QL NAA+PROBE: DETECTED
SARS-COV-2 RNA SPEC QL NAA+PROBE: DETECTED
SARS-COV-2 RNA SPEC QL NAA+PROBE: SIGNIFICANT CHANGE UP
SODIUM SERPL-SCNC: 128 MMOL/L — LOW (ref 135–145)
SODIUM SERPL-SCNC: 129 MMOL/L — LOW (ref 135–145)
SODIUM SERPL-SCNC: 132 MMOL/L — LOW (ref 135–145)
SODIUM SERPL-SCNC: 133 MMOL/L — LOW (ref 135–145)
SODIUM SERPL-SCNC: 134 MMOL/L — LOW (ref 135–145)
SODIUM SERPL-SCNC: 135 MMOL/L — SIGNIFICANT CHANGE UP (ref 135–145)
SODIUM SERPL-SCNC: 136 MMOL/L — SIGNIFICANT CHANGE UP (ref 135–145)
SODIUM SERPL-SCNC: 136 MMOL/L — SIGNIFICANT CHANGE UP (ref 135–145)
SODIUM SERPL-SCNC: 137 MMOL/L — SIGNIFICANT CHANGE UP (ref 135–145)
SODIUM SERPL-SCNC: 139 MMOL/L — SIGNIFICANT CHANGE UP (ref 135–145)
T-CELL CD4 SUBSET PNL BLD: 271 /UL — LOW (ref 489–1457)
T3 SERPL-MCNC: 48 NG/DL — LOW (ref 80–200)
T4 FREE SERPL-MCNC: 1.3 NG/DL — SIGNIFICANT CHANGE UP (ref 0.9–1.8)
TRIGL SERPL-MCNC: 174 MG/DL — HIGH
TROPONIN I SERPL-MCNC: <0.015 NG/ML — SIGNIFICANT CHANGE UP (ref 0–0.04)
TSH SERPL-MCNC: 0.14 UU/ML — LOW (ref 0.34–4.82)
VIT D25+D1,25 OH+D1,25 PNL SERPL-MCNC: 128 PG/ML — HIGH (ref 19.9–79.3)
VIT D25+D1,25 OH+D1,25 PNL SERPL-MCNC: 128 PG/ML — HIGH (ref 19.9–79.3)
WBC # BLD: 11.17 K/UL — HIGH (ref 3.8–10.5)
WBC # BLD: 12.34 K/UL — HIGH (ref 3.8–10.5)
WBC # BLD: 12.54 K/UL — HIGH (ref 3.8–10.5)
WBC # BLD: 12.96 K/UL — HIGH (ref 3.8–10.5)
WBC # BLD: 13.29 K/UL — HIGH (ref 3.8–10.5)
WBC # BLD: 13.29 K/UL — HIGH (ref 3.8–10.5)
WBC # BLD: 13.57 K/UL — HIGH (ref 3.8–10.5)
WBC # BLD: 14.61 K/UL — HIGH (ref 3.8–10.5)
WBC # BLD: 14.91 K/UL — HIGH (ref 3.8–10.5)
WBC # BLD: 15 K/UL — HIGH (ref 3.8–10.5)
WBC # BLD: 15.77 K/UL — HIGH (ref 3.8–10.5)
WBC # BLD: 16.11 K/UL — HIGH (ref 3.8–10.5)
WBC # BLD: 16.23 K/UL — HIGH (ref 3.8–10.5)
WBC # BLD: 18.29 K/UL — HIGH (ref 3.8–10.5)
WBC # BLD: 18.48 K/UL — HIGH (ref 3.8–10.5)
WBC # BLD: 19.84 K/UL — HIGH (ref 3.8–10.5)
WBC # FLD AUTO: 11.17 K/UL — HIGH (ref 3.8–10.5)
WBC # FLD AUTO: 12.34 K/UL — HIGH (ref 3.8–10.5)
WBC # FLD AUTO: 12.54 K/UL — HIGH (ref 3.8–10.5)
WBC # FLD AUTO: 12.96 K/UL — HIGH (ref 3.8–10.5)
WBC # FLD AUTO: 13.29 K/UL — HIGH (ref 3.8–10.5)
WBC # FLD AUTO: 13.29 K/UL — HIGH (ref 3.8–10.5)
WBC # FLD AUTO: 13.57 K/UL — HIGH (ref 3.8–10.5)
WBC # FLD AUTO: 14.61 K/UL — HIGH (ref 3.8–10.5)
WBC # FLD AUTO: 14.91 K/UL — HIGH (ref 3.8–10.5)
WBC # FLD AUTO: 15 K/UL — HIGH (ref 3.8–10.5)
WBC # FLD AUTO: 15.77 K/UL — HIGH (ref 3.8–10.5)
WBC # FLD AUTO: 16.11 K/UL — HIGH (ref 3.8–10.5)
WBC # FLD AUTO: 16.23 K/UL — HIGH (ref 3.8–10.5)
WBC # FLD AUTO: 18.29 K/UL — HIGH (ref 3.8–10.5)
WBC # FLD AUTO: 18.48 K/UL — HIGH (ref 3.8–10.5)
WBC # FLD AUTO: 19.84 K/UL — HIGH (ref 3.8–10.5)

## 2020-01-01 PROCEDURE — 86901 BLOOD TYPING SEROLOGIC RH(D): CPT

## 2020-01-01 PROCEDURE — 82803 BLOOD GASES ANY COMBINATION: CPT

## 2020-01-01 PROCEDURE — 85379 FIBRIN DEGRADATION QUANT: CPT

## 2020-01-01 PROCEDURE — 71045 X-RAY EXAM CHEST 1 VIEW: CPT | Mod: 26

## 2020-01-01 PROCEDURE — 82553 CREATINE MB FRACTION: CPT

## 2020-01-01 PROCEDURE — 85610 PROTHROMBIN TIME: CPT

## 2020-01-01 PROCEDURE — 82550 ASSAY OF CK (CPK): CPT

## 2020-01-01 PROCEDURE — 99285 EMERGENCY DEPT VISIT HI MDM: CPT | Mod: 25

## 2020-01-01 PROCEDURE — 0225U NFCT DS DNA&RNA 21 SARSCOV2: CPT

## 2020-01-01 PROCEDURE — 93010 ELECTROCARDIOGRAM REPORT: CPT

## 2020-01-01 PROCEDURE — 85025 COMPLETE CBC W/AUTO DIFF WBC: CPT

## 2020-01-01 PROCEDURE — 82728 ASSAY OF FERRITIN: CPT

## 2020-01-01 PROCEDURE — 83036 HEMOGLOBIN GLYCOSYLATED A1C: CPT

## 2020-01-01 PROCEDURE — 84145 PROCALCITONIN (PCT): CPT

## 2020-01-01 PROCEDURE — 84480 ASSAY TRIIODOTHYRONINE (T3): CPT

## 2020-01-01 PROCEDURE — 82565 ASSAY OF CREATININE: CPT

## 2020-01-01 PROCEDURE — 85027 COMPLETE CBC AUTOMATED: CPT

## 2020-01-01 PROCEDURE — 87635 SARS-COV-2 COVID-19 AMP PRB: CPT

## 2020-01-01 PROCEDURE — 99233 SBSQ HOSP IP/OBS HIGH 50: CPT

## 2020-01-01 PROCEDURE — 80061 LIPID PANEL: CPT

## 2020-01-01 PROCEDURE — 82962 GLUCOSE BLOOD TEST: CPT

## 2020-01-01 PROCEDURE — 82310 ASSAY OF CALCIUM: CPT

## 2020-01-01 PROCEDURE — 83735 ASSAY OF MAGNESIUM: CPT

## 2020-01-01 PROCEDURE — 86359 T CELLS TOTAL COUNT: CPT

## 2020-01-01 PROCEDURE — 93005 ELECTROCARDIOGRAM TRACING: CPT

## 2020-01-01 PROCEDURE — 84443 ASSAY THYROID STIM HORMONE: CPT

## 2020-01-01 PROCEDURE — 86140 C-REACTIVE PROTEIN: CPT

## 2020-01-01 PROCEDURE — 97110 THERAPEUTIC EXERCISES: CPT

## 2020-01-01 PROCEDURE — 86769 SARS-COV-2 COVID-19 ANTIBODY: CPT

## 2020-01-01 PROCEDURE — 84439 ASSAY OF FREE THYROXINE: CPT

## 2020-01-01 PROCEDURE — 87641 MR-STAPH DNA AMP PROBE: CPT

## 2020-01-01 PROCEDURE — 80076 HEPATIC FUNCTION PANEL: CPT

## 2020-01-01 PROCEDURE — 96374 THER/PROPH/DIAG INJ IV PUSH: CPT

## 2020-01-01 PROCEDURE — 99285 EMERGENCY DEPT VISIT HI MDM: CPT

## 2020-01-01 PROCEDURE — 83615 LACTATE (LD) (LDH) ENZYME: CPT

## 2020-01-01 PROCEDURE — 85730 THROMBOPLASTIN TIME PARTIAL: CPT

## 2020-01-01 PROCEDURE — 82306 VITAMIN D 25 HYDROXY: CPT

## 2020-01-01 PROCEDURE — 80048 BASIC METABOLIC PNL TOTAL CA: CPT

## 2020-01-01 PROCEDURE — 86900 BLOOD TYPING SEROLOGIC ABO: CPT

## 2020-01-01 PROCEDURE — 86850 RBC ANTIBODY SCREEN: CPT

## 2020-01-01 PROCEDURE — 80074 ACUTE HEPATITIS PANEL: CPT

## 2020-01-01 PROCEDURE — 87640 STAPH A DNA AMP PROBE: CPT

## 2020-01-01 PROCEDURE — 36430 TRANSFUSION BLD/BLD COMPNT: CPT

## 2020-01-01 PROCEDURE — 99223 1ST HOSP IP/OBS HIGH 75: CPT

## 2020-01-01 PROCEDURE — 71045 X-RAY EXAM CHEST 1 VIEW: CPT

## 2020-01-01 PROCEDURE — 94660 CPAP INITIATION&MGMT: CPT

## 2020-01-01 PROCEDURE — 82652 VIT D 1 25-DIHYDROXY: CPT

## 2020-01-01 PROCEDURE — 84484 ASSAY OF TROPONIN QUANT: CPT

## 2020-01-01 PROCEDURE — 80053 COMPREHEN METABOLIC PANEL: CPT

## 2020-01-01 PROCEDURE — 97530 THERAPEUTIC ACTIVITIES: CPT

## 2020-01-01 PROCEDURE — 84100 ASSAY OF PHOSPHORUS: CPT

## 2020-01-01 PROCEDURE — 83970 ASSAY OF PARATHORMONE: CPT

## 2020-01-01 PROCEDURE — 36415 COLL VENOUS BLD VENIPUNCTURE: CPT

## 2020-01-01 PROCEDURE — 86706 HEP B SURFACE ANTIBODY: CPT

## 2020-01-01 PROCEDURE — 86803 HEPATITIS C AB TEST: CPT

## 2020-01-01 PROCEDURE — 94640 AIRWAY INHALATION TREATMENT: CPT

## 2020-01-01 PROCEDURE — 82164 ANGIOTENSIN I ENZYME TEST: CPT

## 2020-01-01 PROCEDURE — 86360 T CELL ABSOLUTE COUNT/RATIO: CPT

## 2020-01-01 PROCEDURE — 85652 RBC SED RATE AUTOMATED: CPT

## 2020-01-01 RX ORDER — DEXAMETHASONE 0.5 MG/5ML
6 ELIXIR ORAL DAILY
Refills: 0 | Status: DISCONTINUED | OUTPATIENT
Start: 2020-01-01 | End: 2020-01-01

## 2020-01-01 RX ORDER — ALBUTEROL 90 UG/1
2 AEROSOL, METERED ORAL EVERY 6 HOURS
Refills: 0 | Status: DISCONTINUED | OUTPATIENT
Start: 2020-01-01 | End: 2020-01-01

## 2020-01-01 RX ORDER — CHLORHEXIDINE GLUCONATE 213 G/1000ML
1 SOLUTION TOPICAL
Refills: 0 | Status: DISCONTINUED | OUTPATIENT
Start: 2020-01-01 | End: 2020-01-01

## 2020-01-01 RX ORDER — INSULIN LISPRO 100/ML
VIAL (ML) SUBCUTANEOUS
Refills: 0 | Status: DISCONTINUED | OUTPATIENT
Start: 2020-01-01 | End: 2020-01-01

## 2020-01-01 RX ORDER — HEPARIN SODIUM 5000 [USP'U]/ML
5000 INJECTION INTRAVENOUS; SUBCUTANEOUS EVERY 8 HOURS
Refills: 0 | Status: DISCONTINUED | OUTPATIENT
Start: 2020-01-01 | End: 2020-01-01

## 2020-01-01 RX ORDER — MORPHINE SULFATE 50 MG/1
2 CAPSULE, EXTENDED RELEASE ORAL
Refills: 0 | Status: DISCONTINUED | OUTPATIENT
Start: 2020-01-01 | End: 2020-01-01

## 2020-01-01 RX ORDER — ENOXAPARIN SODIUM 100 MG/ML
40 INJECTION SUBCUTANEOUS DAILY
Refills: 0 | Status: DISCONTINUED | OUTPATIENT
Start: 2020-01-01 | End: 2020-01-01

## 2020-01-01 RX ORDER — PIPERACILLIN AND TAZOBACTAM 4; .5 G/20ML; G/20ML
3.38 INJECTION, POWDER, LYOPHILIZED, FOR SOLUTION INTRAVENOUS ONCE
Refills: 0 | Status: COMPLETED | OUTPATIENT
Start: 2020-01-01 | End: 2020-01-01

## 2020-01-01 RX ORDER — DEXTROSE 50 % IN WATER 50 %
12.5 SYRINGE (ML) INTRAVENOUS ONCE
Refills: 0 | Status: DISCONTINUED | OUTPATIENT
Start: 2020-01-01 | End: 2020-01-01

## 2020-01-01 RX ORDER — SODIUM CHLORIDE 9 MG/ML
1000 INJECTION, SOLUTION INTRAVENOUS
Refills: 0 | Status: DISCONTINUED | OUTPATIENT
Start: 2020-01-01 | End: 2020-01-01

## 2020-01-01 RX ORDER — ALPRAZOLAM 0.25 MG
1 TABLET ORAL
Qty: 0 | Refills: 0 | DISCHARGE

## 2020-01-01 RX ORDER — CHOLECALCIFEROL (VITAMIN D3) 125 MCG
2000 CAPSULE ORAL DAILY
Refills: 0 | Status: DISCONTINUED | OUTPATIENT
Start: 2020-01-01 | End: 2020-01-01

## 2020-01-01 RX ORDER — ZOLPIDEM TARTRATE 10 MG/1
5 TABLET ORAL ONCE
Refills: 0 | Status: DISCONTINUED | OUTPATIENT
Start: 2020-01-01 | End: 2020-01-01

## 2020-01-01 RX ORDER — DEXAMETHASONE 0.5 MG/5ML
6 ELIXIR ORAL DAILY
Refills: 0 | Status: COMPLETED | OUTPATIENT
Start: 2020-01-01 | End: 2020-01-01

## 2020-01-01 RX ORDER — INSULIN HUMAN 100 [IU]/ML
10 INJECTION, SOLUTION SUBCUTANEOUS ONCE
Refills: 0 | Status: DISCONTINUED | OUTPATIENT
Start: 2020-01-01 | End: 2020-01-01

## 2020-01-01 RX ORDER — BENZOCAINE AND MENTHOL 5; 1 G/100ML; G/100ML
1 LIQUID ORAL ONCE
Refills: 0 | Status: COMPLETED | OUTPATIENT
Start: 2020-01-01 | End: 2020-01-01

## 2020-01-01 RX ORDER — ASCORBIC ACID 60 MG
1000 TABLET,CHEWABLE ORAL DAILY
Refills: 0 | Status: DISCONTINUED | OUTPATIENT
Start: 2020-01-01 | End: 2020-01-01

## 2020-01-01 RX ORDER — MULTIVIT-MIN/FERROUS GLUCONATE 9 MG/15 ML
1 LIQUID (ML) ORAL
Qty: 0 | Refills: 0 | DISCHARGE

## 2020-01-01 RX ORDER — REMDESIVIR 5 MG/ML
200 INJECTION INTRAVENOUS EVERY 24 HOURS
Refills: 0 | Status: COMPLETED | OUTPATIENT
Start: 2020-01-01 | End: 2020-01-01

## 2020-01-01 RX ORDER — MONTELUKAST 4 MG/1
10 TABLET, CHEWABLE ORAL AT BEDTIME
Refills: 0 | Status: COMPLETED | OUTPATIENT
Start: 2020-01-01 | End: 2020-01-01

## 2020-01-01 RX ORDER — ALPRAZOLAM 0.25 MG
0.5 TABLET ORAL
Refills: 0 | Status: DISCONTINUED | OUTPATIENT
Start: 2020-01-01 | End: 2020-01-01

## 2020-01-01 RX ORDER — AZITHROMYCIN 500 MG/1
TABLET, FILM COATED ORAL
Refills: 0 | Status: DISCONTINUED | OUTPATIENT
Start: 2020-01-01 | End: 2020-01-01

## 2020-01-01 RX ORDER — REMDESIVIR 5 MG/ML
INJECTION INTRAVENOUS
Refills: 0 | Status: COMPLETED | OUTPATIENT
Start: 2020-01-01 | End: 2020-01-01

## 2020-01-01 RX ORDER — SODIUM ZIRCONIUM CYCLOSILICATE 10 G/10G
10 POWDER, FOR SUSPENSION ORAL EVERY 8 HOURS
Refills: 0 | Status: DISCONTINUED | OUTPATIENT
Start: 2020-01-01 | End: 2020-01-01

## 2020-01-01 RX ORDER — DEXMEDETOMIDINE HYDROCHLORIDE IN 0.9% SODIUM CHLORIDE 4 UG/ML
0.2 INJECTION INTRAVENOUS
Qty: 200 | Refills: 0 | Status: DISCONTINUED | OUTPATIENT
Start: 2020-01-01 | End: 2020-01-01

## 2020-01-01 RX ORDER — POLYETHYLENE GLYCOL 3350 17 G/17G
17 POWDER, FOR SOLUTION ORAL DAILY
Refills: 0 | Status: DISCONTINUED | OUTPATIENT
Start: 2020-01-01 | End: 2020-01-01

## 2020-01-01 RX ORDER — MONTELUKAST 4 MG/1
10 TABLET, CHEWABLE ORAL AT BEDTIME
Refills: 0 | Status: DISCONTINUED | OUTPATIENT
Start: 2020-01-01 | End: 2020-01-01

## 2020-01-01 RX ORDER — PIPERACILLIN AND TAZOBACTAM 4; .5 G/20ML; G/20ML
3.38 INJECTION, POWDER, LYOPHILIZED, FOR SOLUTION INTRAVENOUS EVERY 8 HOURS
Refills: 0 | Status: DISCONTINUED | OUTPATIENT
Start: 2020-01-01 | End: 2020-01-01

## 2020-01-01 RX ORDER — MORPHINE SULFATE 50 MG/1
2 CAPSULE, EXTENDED RELEASE ORAL EVERY 4 HOURS
Refills: 0 | Status: DISCONTINUED | OUTPATIENT
Start: 2020-01-01 | End: 2020-01-01

## 2020-01-01 RX ORDER — DEXTROSE 50 % IN WATER 50 %
50 SYRINGE (ML) INTRAVENOUS ONCE
Refills: 0 | Status: DISCONTINUED | OUTPATIENT
Start: 2020-01-01 | End: 2020-01-01

## 2020-01-01 RX ORDER — ENOXAPARIN SODIUM 100 MG/ML
40 INJECTION SUBCUTANEOUS EVERY 12 HOURS
Refills: 0 | Status: DISCONTINUED | OUTPATIENT
Start: 2020-01-01 | End: 2020-01-01

## 2020-01-01 RX ORDER — LEVOTHYROXINE SODIUM 125 MCG
1 TABLET ORAL
Qty: 0 | Refills: 0 | DISCHARGE

## 2020-01-01 RX ORDER — AZITHROMYCIN 500 MG/1
500 TABLET, FILM COATED ORAL ONCE
Refills: 0 | Status: COMPLETED | OUTPATIENT
Start: 2020-01-01 | End: 2020-01-01

## 2020-01-01 RX ORDER — MORPHINE SULFATE 50 MG/1
1 CAPSULE, EXTENDED RELEASE ORAL EVERY 4 HOURS
Refills: 0 | Status: DISCONTINUED | OUTPATIENT
Start: 2020-01-01 | End: 2020-01-01

## 2020-01-01 RX ORDER — SODIUM CHLORIDE 9 MG/ML
10 INJECTION INTRAMUSCULAR; INTRAVENOUS; SUBCUTANEOUS
Refills: 0 | Status: DISCONTINUED | OUTPATIENT
Start: 2020-01-01 | End: 2020-01-01

## 2020-01-01 RX ORDER — ALPRAZOLAM 0.25 MG
2 TABLET ORAL AT BEDTIME
Refills: 0 | Status: DISCONTINUED | OUTPATIENT
Start: 2020-01-01 | End: 2020-01-01

## 2020-01-01 RX ORDER — ALBUTEROL 90 UG/1
1 AEROSOL, METERED ORAL EVERY 4 HOURS
Refills: 0 | Status: COMPLETED | OUTPATIENT
Start: 2020-01-01 | End: 2021-11-10

## 2020-01-01 RX ORDER — PHENYLEPHRINE HYDROCHLORIDE 10 MG/ML
0.1 INJECTION INTRAVENOUS
Qty: 40 | Refills: 0 | Status: DISCONTINUED | OUTPATIENT
Start: 2020-01-01 | End: 2020-01-01

## 2020-01-01 RX ORDER — DEXAMETHASONE 0.5 MG/5ML
1 ELIXIR ORAL ONCE
Refills: 0 | Status: COMPLETED | OUTPATIENT
Start: 2020-01-01 | End: 2020-01-01

## 2020-01-01 RX ORDER — REMDESIVIR 5 MG/ML
INJECTION INTRAVENOUS
Refills: 0 | Status: DISCONTINUED | OUTPATIENT
Start: 2020-01-01 | End: 2020-01-01

## 2020-01-01 RX ORDER — ALPRAZOLAM 0.25 MG
1 TABLET ORAL EVERY 12 HOURS
Refills: 0 | Status: DISCONTINUED | OUTPATIENT
Start: 2020-01-01 | End: 2020-01-01

## 2020-01-01 RX ORDER — CHOLECALCIFEROL (VITAMIN D3) 125 MCG
0 CAPSULE ORAL
Qty: 0 | Refills: 0 | DISCHARGE

## 2020-01-01 RX ORDER — DEXMEDETOMIDINE HYDROCHLORIDE IN 0.9% SODIUM CHLORIDE 4 UG/ML
0.7 INJECTION INTRAVENOUS
Qty: 200 | Refills: 0 | Status: DISCONTINUED | OUTPATIENT
Start: 2020-01-01 | End: 2020-01-01

## 2020-01-01 RX ORDER — DEXAMETHASONE 0.5 MG/5ML
2 ELIXIR ORAL ONCE
Refills: 0 | Status: COMPLETED | OUTPATIENT
Start: 2020-01-01 | End: 2020-01-01

## 2020-01-01 RX ORDER — LANOLIN ALCOHOL/MO/W.PET/CERES
5 CREAM (GRAM) TOPICAL AT BEDTIME
Refills: 0 | Status: DISCONTINUED | OUTPATIENT
Start: 2020-01-01 | End: 2020-01-01

## 2020-01-01 RX ORDER — MORPHINE SULFATE 50 MG/1
1 CAPSULE, EXTENDED RELEASE ORAL ONCE
Refills: 0 | Status: DISCONTINUED | OUTPATIENT
Start: 2020-01-01 | End: 2020-01-01

## 2020-01-01 RX ORDER — CEFTRIAXONE 500 MG/1
INJECTION, POWDER, FOR SOLUTION INTRAMUSCULAR; INTRAVENOUS
Refills: 0 | Status: DISCONTINUED | OUTPATIENT
Start: 2020-01-01 | End: 2020-01-01

## 2020-01-01 RX ORDER — ACETAMINOPHEN 500 MG
650 TABLET ORAL EVERY 6 HOURS
Refills: 0 | Status: DISCONTINUED | OUTPATIENT
Start: 2020-01-01 | End: 2020-01-01

## 2020-01-01 RX ORDER — PANTOPRAZOLE SODIUM 20 MG/1
40 TABLET, DELAYED RELEASE ORAL
Refills: 0 | Status: DISCONTINUED | OUTPATIENT
Start: 2020-01-01 | End: 2020-01-01

## 2020-01-01 RX ORDER — DEXTROSE 50 % IN WATER 50 %
25 SYRINGE (ML) INTRAVENOUS ONCE
Refills: 0 | Status: DISCONTINUED | OUTPATIENT
Start: 2020-01-01 | End: 2020-01-01

## 2020-01-01 RX ORDER — LEVOTHYROXINE SODIUM 125 MCG
100 TABLET ORAL DAILY
Refills: 0 | Status: DISCONTINUED | OUTPATIENT
Start: 2020-01-01 | End: 2020-01-01

## 2020-01-01 RX ORDER — GLUCAGON INJECTION, SOLUTION 0.5 MG/.1ML
1 INJECTION, SOLUTION SUBCUTANEOUS ONCE
Refills: 0 | Status: DISCONTINUED | OUTPATIENT
Start: 2020-01-01 | End: 2020-01-01

## 2020-01-01 RX ORDER — SODIUM CHLORIDE 9 MG/ML
1000 INJECTION INTRAMUSCULAR; INTRAVENOUS; SUBCUTANEOUS
Refills: 0 | Status: DISCONTINUED | OUTPATIENT
Start: 2020-01-01 | End: 2020-01-01

## 2020-01-01 RX ORDER — MORPHINE SULFATE 50 MG/1
1 CAPSULE, EXTENDED RELEASE ORAL EVERY 8 HOURS
Refills: 0 | Status: DISCONTINUED | OUTPATIENT
Start: 2020-01-01 | End: 2020-01-01

## 2020-01-01 RX ORDER — ZINC SULFATE TAB 220 MG (50 MG ZINC EQUIVALENT) 220 (50 ZN) MG
220 TAB ORAL DAILY
Refills: 0 | Status: DISCONTINUED | OUTPATIENT
Start: 2020-01-01 | End: 2020-01-01

## 2020-01-01 RX ORDER — ALBUTEROL 90 UG/1
1 AEROSOL, METERED ORAL EVERY 4 HOURS
Refills: 0 | Status: DISCONTINUED | OUTPATIENT
Start: 2020-01-01 | End: 2020-01-01

## 2020-01-01 RX ORDER — CALCIUM GLUCONATE 100 MG/ML
1 VIAL (ML) INTRAVENOUS ONCE
Refills: 0 | Status: COMPLETED | OUTPATIENT
Start: 2020-01-01 | End: 2020-01-01

## 2020-01-01 RX ORDER — CEFTRIAXONE 500 MG/1
1000 INJECTION, POWDER, FOR SOLUTION INTRAMUSCULAR; INTRAVENOUS EVERY 24 HOURS
Refills: 0 | Status: DISCONTINUED | OUTPATIENT
Start: 2020-01-01 | End: 2020-01-01

## 2020-01-01 RX ORDER — POTASSIUM PHOSPHATE, MONOBASIC POTASSIUM PHOSPHATE, DIBASIC 236; 224 MG/ML; MG/ML
15 INJECTION, SOLUTION INTRAVENOUS ONCE
Refills: 0 | Status: COMPLETED | OUTPATIENT
Start: 2020-01-01 | End: 2020-01-01

## 2020-01-01 RX ORDER — DEXAMETHASONE 0.5 MG/5ML
4 ELIXIR ORAL ONCE
Refills: 0 | Status: COMPLETED | OUTPATIENT
Start: 2020-01-01 | End: 2020-01-01

## 2020-01-01 RX ORDER — SENNA PLUS 8.6 MG/1
2 TABLET ORAL AT BEDTIME
Refills: 0 | Status: DISCONTINUED | OUTPATIENT
Start: 2020-01-01 | End: 2020-01-01

## 2020-01-01 RX ORDER — CITALOPRAM 10 MG/1
10 TABLET, FILM COATED ORAL DAILY
Refills: 0 | Status: DISCONTINUED | OUTPATIENT
Start: 2020-01-01 | End: 2020-01-01

## 2020-01-01 RX ORDER — ACETAMINOPHEN 500 MG
975 TABLET ORAL ONCE
Refills: 0 | Status: COMPLETED | OUTPATIENT
Start: 2020-01-01 | End: 2020-01-01

## 2020-01-01 RX ORDER — FAMOTIDINE 10 MG/ML
40 INJECTION INTRAVENOUS
Refills: 0 | Status: DISCONTINUED | OUTPATIENT
Start: 2020-01-01 | End: 2020-01-01

## 2020-01-01 RX ORDER — AZITHROMYCIN 500 MG/1
500 TABLET, FILM COATED ORAL EVERY 24 HOURS
Refills: 0 | Status: DISCONTINUED | OUTPATIENT
Start: 2020-01-01 | End: 2020-01-01

## 2020-01-01 RX ORDER — LIDOCAINE 4 G/100G
1 CREAM TOPICAL ONCE
Refills: 0 | Status: COMPLETED | OUTPATIENT
Start: 2020-01-01 | End: 2020-01-01

## 2020-01-01 RX ORDER — PAMIDRONATE DISODIUM 9 MG/ML
90 INJECTION, SOLUTION INTRAVENOUS ONCE
Refills: 0 | Status: COMPLETED | OUTPATIENT
Start: 2020-01-01 | End: 2020-01-01

## 2020-01-01 RX ORDER — MORPHINE SULFATE 50 MG/1
1 CAPSULE, EXTENDED RELEASE ORAL
Refills: 0 | Status: DISCONTINUED | OUTPATIENT
Start: 2020-01-01 | End: 2020-01-01

## 2020-01-01 RX ORDER — REMDESIVIR 5 MG/ML
100 INJECTION INTRAVENOUS EVERY 24 HOURS
Refills: 0 | Status: COMPLETED | OUTPATIENT
Start: 2020-01-01 | End: 2020-01-01

## 2020-01-01 RX ORDER — CEFTRIAXONE 500 MG/1
1000 INJECTION, POWDER, FOR SOLUTION INTRAMUSCULAR; INTRAVENOUS ONCE
Refills: 0 | Status: COMPLETED | OUTPATIENT
Start: 2020-01-01 | End: 2020-01-01

## 2020-01-01 RX ORDER — VANCOMYCIN HCL 1 G
1250 VIAL (EA) INTRAVENOUS EVERY 12 HOURS
Refills: 0 | Status: DISCONTINUED | OUTPATIENT
Start: 2020-01-01 | End: 2020-01-01

## 2020-01-01 RX ADMIN — Medication 1: at 11:56

## 2020-01-01 RX ADMIN — CHLORHEXIDINE GLUCONATE 1 APPLICATION(S): 213 SOLUTION TOPICAL at 05:15

## 2020-01-01 RX ADMIN — PIPERACILLIN AND TAZOBACTAM 25 GRAM(S): 4; .5 INJECTION, POWDER, LYOPHILIZED, FOR SOLUTION INTRAVENOUS at 17:51

## 2020-01-01 RX ADMIN — PHENYLEPHRINE HYDROCHLORIDE 3.21 MICROGRAM(S)/KG/MIN: 10 INJECTION INTRAVENOUS at 08:55

## 2020-01-01 RX ADMIN — ENOXAPARIN SODIUM 40 MILLIGRAM(S): 100 INJECTION SUBCUTANEOUS at 05:18

## 2020-01-01 RX ADMIN — ALBUTEROL 2 PUFF(S): 90 AEROSOL, METERED ORAL at 10:20

## 2020-01-01 RX ADMIN — ALBUTEROL 2 PUFF(S): 90 AEROSOL, METERED ORAL at 21:58

## 2020-01-01 RX ADMIN — Medication 650 MILLIGRAM(S): at 12:11

## 2020-01-01 RX ADMIN — ENOXAPARIN SODIUM 40 MILLIGRAM(S): 100 INJECTION SUBCUTANEOUS at 17:32

## 2020-01-01 RX ADMIN — SENNA PLUS 2 TABLET(S): 8.6 TABLET ORAL at 21:58

## 2020-01-01 RX ADMIN — Medication 100 MICROGRAM(S): at 05:54

## 2020-01-01 RX ADMIN — POLYETHYLENE GLYCOL 3350 17 GRAM(S): 17 POWDER, FOR SOLUTION ORAL at 11:16

## 2020-01-01 RX ADMIN — Medication 62.5 MILLIMOLE(S): at 17:17

## 2020-01-01 RX ADMIN — ALBUTEROL 2 PUFF(S): 90 AEROSOL, METERED ORAL at 20:01

## 2020-01-01 RX ADMIN — Medication 6 MILLIGRAM(S): at 05:06

## 2020-01-01 RX ADMIN — PANTOPRAZOLE SODIUM 40 MILLIGRAM(S): 20 TABLET, DELAYED RELEASE ORAL at 06:04

## 2020-01-01 RX ADMIN — ALBUTEROL 1 PUFF(S): 90 AEROSOL, METERED ORAL at 23:56

## 2020-01-01 RX ADMIN — CITALOPRAM 10 MILLIGRAM(S): 10 TABLET, FILM COATED ORAL at 23:45

## 2020-01-01 RX ADMIN — Medication 100 GRAM(S): at 22:00

## 2020-01-01 RX ADMIN — MORPHINE SULFATE 1 MILLIGRAM(S): 50 CAPSULE, EXTENDED RELEASE ORAL at 10:15

## 2020-01-01 RX ADMIN — Medication 100 MICROGRAM(S): at 05:16

## 2020-01-01 RX ADMIN — Medication 1000 MILLIGRAM(S): at 18:27

## 2020-01-01 RX ADMIN — CHLORHEXIDINE GLUCONATE 1 APPLICATION(S): 213 SOLUTION TOPICAL at 05:42

## 2020-01-01 RX ADMIN — CHLORHEXIDINE GLUCONATE 1 APPLICATION(S): 213 SOLUTION TOPICAL at 06:05

## 2020-01-01 RX ADMIN — ENOXAPARIN SODIUM 40 MILLIGRAM(S): 100 INJECTION SUBCUTANEOUS at 11:25

## 2020-01-01 RX ADMIN — PANTOPRAZOLE SODIUM 40 MILLIGRAM(S): 20 TABLET, DELAYED RELEASE ORAL at 06:14

## 2020-01-01 RX ADMIN — ALBUTEROL 2 PUFF(S): 90 AEROSOL, METERED ORAL at 13:38

## 2020-01-01 RX ADMIN — MORPHINE SULFATE 1 MILLIGRAM(S): 50 CAPSULE, EXTENDED RELEASE ORAL at 22:08

## 2020-01-01 RX ADMIN — Medication 1 MILLIGRAM(S): at 17:35

## 2020-01-01 RX ADMIN — MORPHINE SULFATE 2 MILLIGRAM(S): 50 CAPSULE, EXTENDED RELEASE ORAL at 17:33

## 2020-01-01 RX ADMIN — ALBUTEROL 2 PUFF(S): 90 AEROSOL, METERED ORAL at 17:05

## 2020-01-01 RX ADMIN — POLYETHYLENE GLYCOL 3350 17 GRAM(S): 17 POWDER, FOR SOLUTION ORAL at 11:52

## 2020-01-01 RX ADMIN — Medication 1 MILLIGRAM(S): at 17:12

## 2020-01-01 RX ADMIN — Medication 100 MICROGRAM(S): at 05:23

## 2020-01-01 RX ADMIN — PANTOPRAZOLE SODIUM 40 MILLIGRAM(S): 20 TABLET, DELAYED RELEASE ORAL at 06:15

## 2020-01-01 RX ADMIN — ALBUTEROL 2 PUFF(S): 90 AEROSOL, METERED ORAL at 03:44

## 2020-01-01 RX ADMIN — MORPHINE SULFATE 1 MILLIGRAM(S): 50 CAPSULE, EXTENDED RELEASE ORAL at 11:16

## 2020-01-01 RX ADMIN — POLYETHYLENE GLYCOL 3350 17 GRAM(S): 17 POWDER, FOR SOLUTION ORAL at 12:59

## 2020-01-01 RX ADMIN — CHLORHEXIDINE GLUCONATE 1 APPLICATION(S): 213 SOLUTION TOPICAL at 05:07

## 2020-01-01 RX ADMIN — MORPHINE SULFATE 2 MILLIGRAM(S): 50 CAPSULE, EXTENDED RELEASE ORAL at 22:00

## 2020-01-01 RX ADMIN — ENOXAPARIN SODIUM 40 MILLIGRAM(S): 100 INJECTION SUBCUTANEOUS at 05:46

## 2020-01-01 RX ADMIN — FAMOTIDINE 40 MILLIGRAM(S): 10 INJECTION INTRAVENOUS at 05:16

## 2020-01-01 RX ADMIN — POLYETHYLENE GLYCOL 3350 17 GRAM(S): 17 POWDER, FOR SOLUTION ORAL at 13:21

## 2020-01-01 RX ADMIN — ALBUTEROL 2 PUFF(S): 90 AEROSOL, METERED ORAL at 21:04

## 2020-01-01 RX ADMIN — MORPHINE SULFATE 1 MILLIGRAM(S): 50 CAPSULE, EXTENDED RELEASE ORAL at 06:08

## 2020-01-01 RX ADMIN — Medication 5 MILLIGRAM(S): at 02:16

## 2020-01-01 RX ADMIN — ENOXAPARIN SODIUM 40 MILLIGRAM(S): 100 INJECTION SUBCUTANEOUS at 05:57

## 2020-01-01 RX ADMIN — Medication 4: at 06:04

## 2020-01-01 RX ADMIN — Medication 2: at 06:07

## 2020-01-01 RX ADMIN — Medication 100 MICROGRAM(S): at 06:52

## 2020-01-01 RX ADMIN — PANTOPRAZOLE SODIUM 40 MILLIGRAM(S): 20 TABLET, DELAYED RELEASE ORAL at 08:00

## 2020-01-01 RX ADMIN — Medication 650 MILLIGRAM(S): at 13:17

## 2020-01-01 RX ADMIN — Medication 0.5 MILLIGRAM(S): at 09:39

## 2020-01-01 RX ADMIN — ENOXAPARIN SODIUM 40 MILLIGRAM(S): 100 INJECTION SUBCUTANEOUS at 17:36

## 2020-01-01 RX ADMIN — POLYETHYLENE GLYCOL 3350 17 GRAM(S): 17 POWDER, FOR SOLUTION ORAL at 12:24

## 2020-01-01 RX ADMIN — Medication 1 MILLIGRAM(S): at 05:06

## 2020-01-01 RX ADMIN — ALBUTEROL 1 PUFF(S): 90 AEROSOL, METERED ORAL at 13:26

## 2020-01-01 RX ADMIN — SENNA PLUS 2 TABLET(S): 8.6 TABLET ORAL at 21:03

## 2020-01-01 RX ADMIN — ALBUTEROL 1 PUFF(S): 90 AEROSOL, METERED ORAL at 11:17

## 2020-01-01 RX ADMIN — ALBUTEROL 2 PUFF(S): 90 AEROSOL, METERED ORAL at 03:09

## 2020-01-01 RX ADMIN — ALBUTEROL 2 PUFF(S): 90 AEROSOL, METERED ORAL at 16:08

## 2020-01-01 RX ADMIN — ALBUTEROL 2 PUFF(S): 90 AEROSOL, METERED ORAL at 15:55

## 2020-01-01 RX ADMIN — Medication 1 MILLIGRAM(S): at 16:27

## 2020-01-01 RX ADMIN — POLYETHYLENE GLYCOL 3350 17 GRAM(S): 17 POWDER, FOR SOLUTION ORAL at 11:15

## 2020-01-01 RX ADMIN — MORPHINE SULFATE 1 MILLIGRAM(S): 50 CAPSULE, EXTENDED RELEASE ORAL at 11:11

## 2020-01-01 RX ADMIN — CEFTRIAXONE 1000 MILLIGRAM(S): 500 INJECTION, POWDER, FOR SOLUTION INTRAMUSCULAR; INTRAVENOUS at 16:34

## 2020-01-01 RX ADMIN — Medication 1 MILLIGRAM(S): at 00:13

## 2020-01-01 RX ADMIN — REMDESIVIR 500 MILLIGRAM(S): 5 INJECTION INTRAVENOUS at 09:43

## 2020-01-01 RX ADMIN — ALBUTEROL 2 PUFF(S): 90 AEROSOL, METERED ORAL at 08:53

## 2020-01-01 RX ADMIN — REMDESIVIR 500 MILLIGRAM(S): 5 INJECTION INTRAVENOUS at 10:04

## 2020-01-01 RX ADMIN — Medication 2 MILLIGRAM(S): at 00:10

## 2020-01-01 RX ADMIN — ALBUTEROL 2 PUFF(S): 90 AEROSOL, METERED ORAL at 10:09

## 2020-01-01 RX ADMIN — PANTOPRAZOLE SODIUM 40 MILLIGRAM(S): 20 TABLET, DELAYED RELEASE ORAL at 07:28

## 2020-01-01 RX ADMIN — Medication 100 MICROGRAM(S): at 05:58

## 2020-01-01 RX ADMIN — MORPHINE SULFATE 1 MILLIGRAM(S): 50 CAPSULE, EXTENDED RELEASE ORAL at 21:06

## 2020-01-01 RX ADMIN — Medication 2 MILLIGRAM(S): at 00:38

## 2020-01-01 RX ADMIN — SENNA PLUS 2 TABLET(S): 8.6 TABLET ORAL at 00:11

## 2020-01-01 RX ADMIN — Medication 1 MILLIGRAM(S): at 18:59

## 2020-01-01 RX ADMIN — Medication 650 MILLIGRAM(S): at 17:25

## 2020-01-01 RX ADMIN — POLYETHYLENE GLYCOL 3350 17 GRAM(S): 17 POWDER, FOR SOLUTION ORAL at 11:43

## 2020-01-01 RX ADMIN — ALBUTEROL 1 PUFF(S): 90 AEROSOL, METERED ORAL at 05:19

## 2020-01-01 RX ADMIN — CHLORHEXIDINE GLUCONATE 1 APPLICATION(S): 213 SOLUTION TOPICAL at 11:52

## 2020-01-01 RX ADMIN — MORPHINE SULFATE 1 MILLIGRAM(S): 50 CAPSULE, EXTENDED RELEASE ORAL at 22:53

## 2020-01-01 RX ADMIN — Medication 4: at 17:12

## 2020-01-01 RX ADMIN — Medication 6 MILLIGRAM(S): at 05:26

## 2020-01-01 RX ADMIN — POTASSIUM PHOSPHATE, MONOBASIC POTASSIUM PHOSPHATE, DIBASIC 62.5 MILLIMOLE(S): 236; 224 INJECTION, SOLUTION INTRAVENOUS at 10:12

## 2020-01-01 RX ADMIN — MORPHINE SULFATE 1 MILLIGRAM(S): 50 CAPSULE, EXTENDED RELEASE ORAL at 21:01

## 2020-01-01 RX ADMIN — PANTOPRAZOLE SODIUM 40 MILLIGRAM(S): 20 TABLET, DELAYED RELEASE ORAL at 06:47

## 2020-01-01 RX ADMIN — Medication 100 MICROGRAM(S): at 06:14

## 2020-01-01 RX ADMIN — MORPHINE SULFATE 1 MILLIGRAM(S): 50 CAPSULE, EXTENDED RELEASE ORAL at 06:38

## 2020-01-01 RX ADMIN — MORPHINE SULFATE 2 MILLIGRAM(S): 50 CAPSULE, EXTENDED RELEASE ORAL at 13:58

## 2020-01-01 RX ADMIN — Medication 650 MILLIGRAM(S): at 03:49

## 2020-01-01 RX ADMIN — MORPHINE SULFATE 2 MILLIGRAM(S): 50 CAPSULE, EXTENDED RELEASE ORAL at 23:58

## 2020-01-01 RX ADMIN — ALBUTEROL 1 PUFF(S): 90 AEROSOL, METERED ORAL at 10:16

## 2020-01-01 RX ADMIN — Medication 650 MILLIGRAM(S): at 11:16

## 2020-01-01 RX ADMIN — ALBUTEROL 2 PUFF(S): 90 AEROSOL, METERED ORAL at 02:50

## 2020-01-01 RX ADMIN — Medication 650 MILLIGRAM(S): at 08:10

## 2020-01-01 RX ADMIN — Medication 6 MILLIGRAM(S): at 05:16

## 2020-01-01 RX ADMIN — ALBUTEROL 2 PUFF(S): 90 AEROSOL, METERED ORAL at 02:02

## 2020-01-01 RX ADMIN — Medication 62.5 MILLIMOLE(S): at 10:03

## 2020-01-01 RX ADMIN — Medication 1 MILLIGRAM(S): at 05:46

## 2020-01-01 RX ADMIN — ALBUTEROL 1 PUFF(S): 90 AEROSOL, METERED ORAL at 17:54

## 2020-01-01 RX ADMIN — Medication 100 MICROGRAM(S): at 05:45

## 2020-01-01 RX ADMIN — Medication 3: at 16:35

## 2020-01-01 RX ADMIN — DEXMEDETOMIDINE HYDROCHLORIDE IN 0.9% SODIUM CHLORIDE 4.29 MICROGRAM(S)/KG/HR: 4 INJECTION INTRAVENOUS at 22:13

## 2020-01-01 RX ADMIN — Medication 1: at 06:16

## 2020-01-01 RX ADMIN — DEXMEDETOMIDINE HYDROCHLORIDE IN 0.9% SODIUM CHLORIDE 15 MICROGRAM(S)/KG/HR: 4 INJECTION INTRAVENOUS at 20:30

## 2020-01-01 RX ADMIN — MORPHINE SULFATE 1 MILLIGRAM(S): 50 CAPSULE, EXTENDED RELEASE ORAL at 10:39

## 2020-01-01 RX ADMIN — SENNA PLUS 2 TABLET(S): 8.6 TABLET ORAL at 21:39

## 2020-01-01 RX ADMIN — DEXMEDETOMIDINE HYDROCHLORIDE IN 0.9% SODIUM CHLORIDE 4.29 MICROGRAM(S)/KG/HR: 4 INJECTION INTRAVENOUS at 06:08

## 2020-01-01 RX ADMIN — PANTOPRAZOLE SODIUM 40 MILLIGRAM(S): 20 TABLET, DELAYED RELEASE ORAL at 06:57

## 2020-01-01 RX ADMIN — Medication 1 MILLIGRAM(S): at 17:11

## 2020-01-01 RX ADMIN — Medication 2 MILLIGRAM(S): at 19:18

## 2020-01-01 RX ADMIN — Medication 1 MILLIGRAM(S): at 17:26

## 2020-01-01 RX ADMIN — AZITHROMYCIN 500 MILLIGRAM(S): 500 TABLET, FILM COATED ORAL at 18:12

## 2020-01-01 RX ADMIN — ALBUTEROL 1 PUFF(S): 90 AEROSOL, METERED ORAL at 02:24

## 2020-01-01 RX ADMIN — ENOXAPARIN SODIUM 40 MILLIGRAM(S): 100 INJECTION SUBCUTANEOUS at 05:14

## 2020-01-01 RX ADMIN — Medication 650 MILLIGRAM(S): at 18:07

## 2020-01-01 RX ADMIN — MORPHINE SULFATE 1 MILLIGRAM(S): 50 CAPSULE, EXTENDED RELEASE ORAL at 10:24

## 2020-01-01 RX ADMIN — BENZOCAINE AND MENTHOL 1 LOZENGE: 5; 1 LIQUID ORAL at 23:30

## 2020-01-01 RX ADMIN — SENNA PLUS 2 TABLET(S): 8.6 TABLET ORAL at 21:26

## 2020-01-01 RX ADMIN — ENOXAPARIN SODIUM 40 MILLIGRAM(S): 100 INJECTION SUBCUTANEOUS at 11:16

## 2020-01-01 RX ADMIN — DEXMEDETOMIDINE HYDROCHLORIDE IN 0.9% SODIUM CHLORIDE 15 MICROGRAM(S)/KG/HR: 4 INJECTION INTRAVENOUS at 15:22

## 2020-01-01 RX ADMIN — SENNA PLUS 2 TABLET(S): 8.6 TABLET ORAL at 22:32

## 2020-01-01 RX ADMIN — MORPHINE SULFATE 1 MILLIGRAM(S): 50 CAPSULE, EXTENDED RELEASE ORAL at 20:07

## 2020-01-01 RX ADMIN — Medication 3: at 11:25

## 2020-01-01 RX ADMIN — DEXMEDETOMIDINE HYDROCHLORIDE IN 0.9% SODIUM CHLORIDE 4.29 MICROGRAM(S)/KG/HR: 4 INJECTION INTRAVENOUS at 16:43

## 2020-01-01 RX ADMIN — DEXMEDETOMIDINE HYDROCHLORIDE IN 0.9% SODIUM CHLORIDE 4.29 MICROGRAM(S)/KG/HR: 4 INJECTION INTRAVENOUS at 12:53

## 2020-01-01 RX ADMIN — ALBUTEROL 2 PUFF(S): 90 AEROSOL, METERED ORAL at 08:58

## 2020-01-01 RX ADMIN — Medication 1 MILLIGRAM(S): at 05:54

## 2020-01-01 RX ADMIN — MORPHINE SULFATE 1 MILLIGRAM(S): 50 CAPSULE, EXTENDED RELEASE ORAL at 13:35

## 2020-01-01 RX ADMIN — CHLORHEXIDINE GLUCONATE 1 APPLICATION(S): 213 SOLUTION TOPICAL at 05:45

## 2020-01-01 RX ADMIN — ENOXAPARIN SODIUM 40 MILLIGRAM(S): 100 INJECTION SUBCUTANEOUS at 17:50

## 2020-01-01 RX ADMIN — Medication 6 MILLIGRAM(S): at 12:28

## 2020-01-01 RX ADMIN — Medication 1 MILLIGRAM(S): at 06:15

## 2020-01-01 RX ADMIN — ENOXAPARIN SODIUM 40 MILLIGRAM(S): 100 INJECTION SUBCUTANEOUS at 13:48

## 2020-01-01 RX ADMIN — DEXMEDETOMIDINE HYDROCHLORIDE IN 0.9% SODIUM CHLORIDE 4.29 MICROGRAM(S)/KG/HR: 4 INJECTION INTRAVENOUS at 23:07

## 2020-01-01 RX ADMIN — MORPHINE SULFATE 2 MILLIGRAM(S): 50 CAPSULE, EXTENDED RELEASE ORAL at 21:24

## 2020-01-01 RX ADMIN — MORPHINE SULFATE 1 MILLIGRAM(S): 50 CAPSULE, EXTENDED RELEASE ORAL at 21:20

## 2020-01-01 RX ADMIN — ALBUTEROL 2 PUFF(S): 90 AEROSOL, METERED ORAL at 14:25

## 2020-01-01 RX ADMIN — Medication 650 MILLIGRAM(S): at 11:35

## 2020-01-01 RX ADMIN — Medication 650 MILLIGRAM(S): at 15:21

## 2020-01-01 RX ADMIN — Medication 2: at 17:01

## 2020-01-01 RX ADMIN — ALBUTEROL 2 PUFF(S): 90 AEROSOL, METERED ORAL at 04:15

## 2020-01-01 RX ADMIN — CHLORHEXIDINE GLUCONATE 1 APPLICATION(S): 213 SOLUTION TOPICAL at 06:15

## 2020-01-01 RX ADMIN — CHLORHEXIDINE GLUCONATE 1 APPLICATION(S): 213 SOLUTION TOPICAL at 05:27

## 2020-01-01 RX ADMIN — Medication 100 MICROGRAM(S): at 06:15

## 2020-01-01 RX ADMIN — CHLORHEXIDINE GLUCONATE 1 APPLICATION(S): 213 SOLUTION TOPICAL at 05:22

## 2020-01-01 RX ADMIN — MORPHINE SULFATE 1 MILLIGRAM(S): 50 CAPSULE, EXTENDED RELEASE ORAL at 17:53

## 2020-01-01 RX ADMIN — Medication 6 MILLIGRAM(S): at 06:04

## 2020-01-01 RX ADMIN — ALBUTEROL 2 PUFF(S): 90 AEROSOL, METERED ORAL at 02:56

## 2020-01-01 RX ADMIN — SENNA PLUS 2 TABLET(S): 8.6 TABLET ORAL at 21:00

## 2020-01-01 RX ADMIN — MORPHINE SULFATE 1 MILLIGRAM(S): 50 CAPSULE, EXTENDED RELEASE ORAL at 10:20

## 2020-01-01 RX ADMIN — Medication 650 MILLIGRAM(S): at 00:38

## 2020-01-01 RX ADMIN — MORPHINE SULFATE 1 MILLIGRAM(S): 50 CAPSULE, EXTENDED RELEASE ORAL at 19:36

## 2020-01-01 RX ADMIN — Medication 2000 UNIT(S): at 17:32

## 2020-01-01 RX ADMIN — ENOXAPARIN SODIUM 40 MILLIGRAM(S): 100 INJECTION SUBCUTANEOUS at 17:12

## 2020-01-01 RX ADMIN — Medication 10 MILLIGRAM(S): at 06:04

## 2020-01-01 RX ADMIN — ALBUTEROL 2 PUFF(S): 90 AEROSOL, METERED ORAL at 00:48

## 2020-01-01 RX ADMIN — Medication 975 MILLIGRAM(S): at 12:56

## 2020-01-01 RX ADMIN — CHLORHEXIDINE GLUCONATE 1 APPLICATION(S): 213 SOLUTION TOPICAL at 05:19

## 2020-01-01 RX ADMIN — ENOXAPARIN SODIUM 40 MILLIGRAM(S): 100 INJECTION SUBCUTANEOUS at 11:12

## 2020-01-01 RX ADMIN — ZOLPIDEM TARTRATE 5 MILLIGRAM(S): 10 TABLET ORAL at 01:49

## 2020-01-01 RX ADMIN — ALBUTEROL 2 PUFF(S): 90 AEROSOL, METERED ORAL at 11:14

## 2020-01-01 RX ADMIN — ALBUTEROL 2 PUFF(S): 90 AEROSOL, METERED ORAL at 20:37

## 2020-01-01 RX ADMIN — SENNA PLUS 2 TABLET(S): 8.6 TABLET ORAL at 22:11

## 2020-01-01 RX ADMIN — PIPERACILLIN AND TAZOBACTAM 25 GRAM(S): 4; .5 INJECTION, POWDER, LYOPHILIZED, FOR SOLUTION INTRAVENOUS at 05:14

## 2020-01-01 RX ADMIN — ALBUTEROL 2 PUFF(S): 90 AEROSOL, METERED ORAL at 22:11

## 2020-01-01 RX ADMIN — Medication 650 MILLIGRAM(S): at 08:41

## 2020-01-01 RX ADMIN — Medication 100 MICROGRAM(S): at 06:16

## 2020-01-01 RX ADMIN — ENOXAPARIN SODIUM 40 MILLIGRAM(S): 100 INJECTION SUBCUTANEOUS at 11:51

## 2020-01-01 RX ADMIN — REMDESIVIR 500 MILLIGRAM(S): 5 INJECTION INTRAVENOUS at 12:28

## 2020-01-01 RX ADMIN — Medication 100 MICROGRAM(S): at 05:06

## 2020-01-01 RX ADMIN — Medication 2: at 11:36

## 2020-01-01 RX ADMIN — POLYETHYLENE GLYCOL 3350 17 GRAM(S): 17 POWDER, FOR SOLUTION ORAL at 17:54

## 2020-01-01 RX ADMIN — REMDESIVIR 500 MILLIGRAM(S): 5 INJECTION INTRAVENOUS at 10:25

## 2020-01-01 RX ADMIN — DEXMEDETOMIDINE HYDROCHLORIDE IN 0.9% SODIUM CHLORIDE 4.29 MICROGRAM(S)/KG/HR: 4 INJECTION INTRAVENOUS at 02:34

## 2020-01-01 RX ADMIN — DEXMEDETOMIDINE HYDROCHLORIDE IN 0.9% SODIUM CHLORIDE 15 MICROGRAM(S)/KG/HR: 4 INJECTION INTRAVENOUS at 08:53

## 2020-01-01 RX ADMIN — MORPHINE SULFATE 2 MILLIGRAM(S): 50 CAPSULE, EXTENDED RELEASE ORAL at 02:00

## 2020-01-01 RX ADMIN — MORPHINE SULFATE 1 MILLIGRAM(S): 50 CAPSULE, EXTENDED RELEASE ORAL at 02:54

## 2020-01-01 RX ADMIN — Medication 100 MICROGRAM(S): at 05:46

## 2020-01-01 RX ADMIN — MORPHINE SULFATE 2 MILLIGRAM(S): 50 CAPSULE, EXTENDED RELEASE ORAL at 19:57

## 2020-01-01 RX ADMIN — ALBUTEROL 1 PUFF(S): 90 AEROSOL, METERED ORAL at 17:11

## 2020-01-01 RX ADMIN — ALBUTEROL 2 PUFF(S): 90 AEROSOL, METERED ORAL at 11:16

## 2020-01-01 RX ADMIN — Medication 166.67 MILLIGRAM(S): at 17:30

## 2020-01-01 RX ADMIN — ALBUTEROL 2 PUFF(S): 90 AEROSOL, METERED ORAL at 21:26

## 2020-01-01 RX ADMIN — ENOXAPARIN SODIUM 40 MILLIGRAM(S): 100 INJECTION SUBCUTANEOUS at 05:17

## 2020-01-01 RX ADMIN — ALBUTEROL 1 PUFF(S): 90 AEROSOL, METERED ORAL at 02:00

## 2020-01-01 RX ADMIN — CHLORHEXIDINE GLUCONATE 1 APPLICATION(S): 213 SOLUTION TOPICAL at 05:55

## 2020-01-01 RX ADMIN — ALBUTEROL 2 PUFF(S): 90 AEROSOL, METERED ORAL at 21:22

## 2020-01-01 RX ADMIN — PANTOPRAZOLE SODIUM 40 MILLIGRAM(S): 20 TABLET, DELAYED RELEASE ORAL at 06:07

## 2020-01-01 RX ADMIN — ENOXAPARIN SODIUM 40 MILLIGRAM(S): 100 INJECTION SUBCUTANEOUS at 17:33

## 2020-01-01 RX ADMIN — ALBUTEROL 2 PUFF(S): 90 AEROSOL, METERED ORAL at 22:00

## 2020-01-01 RX ADMIN — PIPERACILLIN AND TAZOBACTAM 25 GRAM(S): 4; .5 INJECTION, POWDER, LYOPHILIZED, FOR SOLUTION INTRAVENOUS at 21:00

## 2020-01-01 RX ADMIN — FAMOTIDINE 40 MILLIGRAM(S): 10 INJECTION INTRAVENOUS at 17:33

## 2020-01-01 RX ADMIN — Medication 166.67 MILLIGRAM(S): at 12:23

## 2020-01-01 RX ADMIN — Medication 6 MILLIGRAM(S): at 14:41

## 2020-01-01 RX ADMIN — ALBUTEROL 2 PUFF(S): 90 AEROSOL, METERED ORAL at 20:06

## 2020-01-01 RX ADMIN — ALBUTEROL 2 PUFF(S): 90 AEROSOL, METERED ORAL at 16:28

## 2020-01-01 RX ADMIN — SENNA PLUS 2 TABLET(S): 8.6 TABLET ORAL at 21:21

## 2020-01-01 RX ADMIN — POLYETHYLENE GLYCOL 3350 17 GRAM(S): 17 POWDER, FOR SOLUTION ORAL at 11:12

## 2020-01-01 RX ADMIN — MONTELUKAST 10 MILLIGRAM(S): 4 TABLET, CHEWABLE ORAL at 22:02

## 2020-01-01 RX ADMIN — MORPHINE SULFATE 1 MILLIGRAM(S): 50 CAPSULE, EXTENDED RELEASE ORAL at 13:05

## 2020-01-01 RX ADMIN — ALBUTEROL 2 PUFF(S): 90 AEROSOL, METERED ORAL at 16:00

## 2020-01-01 RX ADMIN — PIPERACILLIN AND TAZOBACTAM 25 GRAM(S): 4; .5 INJECTION, POWDER, LYOPHILIZED, FOR SOLUTION INTRAVENOUS at 14:22

## 2020-01-01 RX ADMIN — Medication 1 MILLIGRAM(S): at 06:14

## 2020-01-01 RX ADMIN — ALBUTEROL 2 PUFF(S): 90 AEROSOL, METERED ORAL at 09:27

## 2020-01-01 RX ADMIN — Medication 1 MILLIGRAM(S): at 06:16

## 2020-01-01 RX ADMIN — PAMIDRONATE DISODIUM 65 MILLIGRAM(S): 9 INJECTION, SOLUTION INTRAVENOUS at 12:00

## 2020-01-01 RX ADMIN — MORPHINE SULFATE 1 MILLIGRAM(S): 50 CAPSULE, EXTENDED RELEASE ORAL at 11:15

## 2020-01-01 RX ADMIN — POLYETHYLENE GLYCOL 3350 17 GRAM(S): 17 POWDER, FOR SOLUTION ORAL at 11:32

## 2020-01-01 RX ADMIN — Medication 1 MILLIGRAM(S): at 17:58

## 2020-01-01 RX ADMIN — Medication 6 MILLIGRAM(S): at 06:15

## 2020-01-01 RX ADMIN — ENOXAPARIN SODIUM 40 MILLIGRAM(S): 100 INJECTION SUBCUTANEOUS at 11:15

## 2020-01-01 RX ADMIN — ALBUTEROL 2 PUFF(S): 90 AEROSOL, METERED ORAL at 03:00

## 2020-01-01 RX ADMIN — MORPHINE SULFATE 1 MILLIGRAM(S): 50 CAPSULE, EXTENDED RELEASE ORAL at 21:00

## 2020-01-01 RX ADMIN — Medication 650 MILLIGRAM(S): at 05:15

## 2020-01-01 RX ADMIN — POTASSIUM PHOSPHATE, MONOBASIC POTASSIUM PHOSPHATE, DIBASIC 62.5 MILLIMOLE(S): 236; 224 INJECTION, SOLUTION INTRAVENOUS at 12:53

## 2020-01-01 RX ADMIN — PIPERACILLIN AND TAZOBACTAM 200 GRAM(S): 4; .5 INJECTION, POWDER, LYOPHILIZED, FOR SOLUTION INTRAVENOUS at 20:32

## 2020-01-01 RX ADMIN — CEFTRIAXONE 100 MILLIGRAM(S): 500 INJECTION, POWDER, FOR SOLUTION INTRAMUSCULAR; INTRAVENOUS at 17:32

## 2020-01-01 RX ADMIN — POLYETHYLENE GLYCOL 3350 17 GRAM(S): 17 POWDER, FOR SOLUTION ORAL at 11:26

## 2020-01-01 RX ADMIN — MORPHINE SULFATE 1 MILLIGRAM(S): 50 CAPSULE, EXTENDED RELEASE ORAL at 17:33

## 2020-01-01 RX ADMIN — ZINC SULFATE TAB 220 MG (50 MG ZINC EQUIVALENT) 220 MILLIGRAM(S): 220 (50 ZN) TAB at 17:32

## 2020-01-01 RX ADMIN — SODIUM CHLORIDE 75 MILLILITER(S): 9 INJECTION INTRAMUSCULAR; INTRAVENOUS; SUBCUTANEOUS at 10:04

## 2020-01-01 RX ADMIN — PIPERACILLIN AND TAZOBACTAM 25 GRAM(S): 4; .5 INJECTION, POWDER, LYOPHILIZED, FOR SOLUTION INTRAVENOUS at 05:18

## 2020-01-01 RX ADMIN — Medication 6 MILLIGRAM(S): at 05:46

## 2020-01-01 RX ADMIN — MONTELUKAST 10 MILLIGRAM(S): 4 TABLET, CHEWABLE ORAL at 21:03

## 2020-01-01 RX ADMIN — MORPHINE SULFATE 2 MILLIGRAM(S): 50 CAPSULE, EXTENDED RELEASE ORAL at 16:29

## 2020-01-01 RX ADMIN — Medication 5 MILLIGRAM(S): at 22:33

## 2020-01-01 RX ADMIN — PANTOPRAZOLE SODIUM 40 MILLIGRAM(S): 20 TABLET, DELAYED RELEASE ORAL at 06:16

## 2020-01-01 RX ADMIN — ENOXAPARIN SODIUM 40 MILLIGRAM(S): 100 INJECTION SUBCUTANEOUS at 11:02

## 2020-01-01 RX ADMIN — Medication 2 MILLIGRAM(S): at 21:01

## 2020-01-01 RX ADMIN — Medication 650 MILLIGRAM(S): at 06:48

## 2020-01-01 RX ADMIN — ALBUTEROL 2 PUFF(S): 90 AEROSOL, METERED ORAL at 20:56

## 2020-01-01 RX ADMIN — Medication 650 MILLIGRAM(S): at 04:04

## 2020-01-01 RX ADMIN — ALBUTEROL 1 PUFF(S): 90 AEROSOL, METERED ORAL at 05:01

## 2020-01-01 RX ADMIN — REMDESIVIR 500 MILLIGRAM(S): 5 INJECTION INTRAVENOUS at 11:51

## 2020-01-01 RX ADMIN — MORPHINE SULFATE 1 MILLIGRAM(S): 50 CAPSULE, EXTENDED RELEASE ORAL at 11:35

## 2020-01-01 RX ADMIN — ALBUTEROL 2 PUFF(S): 90 AEROSOL, METERED ORAL at 03:30

## 2020-01-01 RX ADMIN — Medication 1 MILLIGRAM(S): at 17:20

## 2020-01-01 RX ADMIN — Medication 4: at 11:41

## 2020-01-01 RX ADMIN — PANTOPRAZOLE SODIUM 40 MILLIGRAM(S): 20 TABLET, DELAYED RELEASE ORAL at 06:05

## 2020-01-01 RX ADMIN — MORPHINE SULFATE 1 MILLIGRAM(S): 50 CAPSULE, EXTENDED RELEASE ORAL at 19:51

## 2020-01-01 RX ADMIN — ALBUTEROL 2 PUFF(S): 90 AEROSOL, METERED ORAL at 16:58

## 2020-01-01 RX ADMIN — Medication 650 MILLIGRAM(S): at 01:30

## 2020-01-01 RX ADMIN — MORPHINE SULFATE 2 MILLIGRAM(S): 50 CAPSULE, EXTENDED RELEASE ORAL at 23:15

## 2020-01-01 RX ADMIN — ALBUTEROL 2 PUFF(S): 90 AEROSOL, METERED ORAL at 09:34

## 2020-01-01 RX ADMIN — ENOXAPARIN SODIUM 40 MILLIGRAM(S): 100 INJECTION SUBCUTANEOUS at 14:41

## 2020-01-01 RX ADMIN — Medication 5 MILLIGRAM(S): at 21:26

## 2020-01-01 RX ADMIN — ENOXAPARIN SODIUM 40 MILLIGRAM(S): 100 INJECTION SUBCUTANEOUS at 11:43

## 2020-01-01 RX ADMIN — Medication 2: at 17:27

## 2020-01-01 RX ADMIN — Medication 6 MILLIGRAM(S): at 06:14

## 2020-01-01 RX ADMIN — MORPHINE SULFATE 2 MILLIGRAM(S): 50 CAPSULE, EXTENDED RELEASE ORAL at 17:30

## 2020-01-01 RX ADMIN — PIPERACILLIN AND TAZOBACTAM 25 GRAM(S): 4; .5 INJECTION, POWDER, LYOPHILIZED, FOR SOLUTION INTRAVENOUS at 21:24

## 2020-01-01 RX ADMIN — ALBUTEROL 2 PUFF(S): 90 AEROSOL, METERED ORAL at 20:48

## 2020-01-01 RX ADMIN — MORPHINE SULFATE 1 MILLIGRAM(S): 50 CAPSULE, EXTENDED RELEASE ORAL at 15:50

## 2020-01-01 RX ADMIN — Medication 1 MILLIGRAM(S): at 17:33

## 2020-01-01 RX ADMIN — Medication 650 MILLIGRAM(S): at 08:40

## 2020-01-01 RX ADMIN — Medication 100 MICROGRAM(S): at 05:26

## 2020-01-01 RX ADMIN — Medication 1 MILLIGRAM(S): at 05:58

## 2020-01-01 RX ADMIN — Medication 2 MILLIGRAM(S): at 22:52

## 2020-01-01 RX ADMIN — MORPHINE SULFATE 1 MILLIGRAM(S): 50 CAPSULE, EXTENDED RELEASE ORAL at 18:23

## 2020-01-01 RX ADMIN — ENOXAPARIN SODIUM 40 MILLIGRAM(S): 100 INJECTION SUBCUTANEOUS at 13:00

## 2020-01-01 RX ADMIN — Medication 650 MILLIGRAM(S): at 16:05

## 2020-01-01 RX ADMIN — ALBUTEROL 1 PUFF(S): 90 AEROSOL, METERED ORAL at 05:05

## 2020-01-01 RX ADMIN — CHLORHEXIDINE GLUCONATE 1 APPLICATION(S): 213 SOLUTION TOPICAL at 06:04

## 2020-01-01 RX ADMIN — ALBUTEROL 2 PUFF(S): 90 AEROSOL, METERED ORAL at 14:33

## 2020-01-01 RX ADMIN — MORPHINE SULFATE 1 MILLIGRAM(S): 50 CAPSULE, EXTENDED RELEASE ORAL at 03:05

## 2020-01-01 RX ADMIN — ALBUTEROL 2 PUFF(S): 90 AEROSOL, METERED ORAL at 16:57

## 2020-01-01 RX ADMIN — Medication 975 MILLIGRAM(S): at 11:05

## 2020-01-01 RX ADMIN — ALBUTEROL 2 PUFF(S): 90 AEROSOL, METERED ORAL at 16:32

## 2020-01-01 RX ADMIN — ALBUTEROL 2 PUFF(S): 90 AEROSOL, METERED ORAL at 18:59

## 2020-01-01 RX ADMIN — MORPHINE SULFATE 1 MILLIGRAM(S): 50 CAPSULE, EXTENDED RELEASE ORAL at 11:46

## 2020-01-01 RX ADMIN — Medication 6 MILLIGRAM(S): at 05:54

## 2020-01-01 RX ADMIN — Medication 5: at 10:53

## 2020-01-01 RX ADMIN — Medication 650 MILLIGRAM(S): at 12:08

## 2020-01-01 RX ADMIN — ALBUTEROL 1 PUFF(S): 90 AEROSOL, METERED ORAL at 21:01

## 2020-01-01 RX ADMIN — AZITHROMYCIN 255 MILLIGRAM(S): 500 TABLET, FILM COATED ORAL at 17:31

## 2020-01-01 RX ADMIN — ALBUTEROL 2 PUFF(S): 90 AEROSOL, METERED ORAL at 09:16
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
## 2020-06-15 NOTE — ED ADULT TRIAGE NOTE - DIRECT TO ROOM CARE INITIATED:
pt seen in OR h/o nec fasciitis left medial foot post multiple debridements/ amputation left great toe    PE: left foot with exposed bone and tendons, viable tissue and areas necrotic tissue debrided to viable tissue--> wound vac placed 06-Nov-2018 22:07

## 2020-12-01 NOTE — H&P ADULT - ATTENDING COMMENTS
61 yo F from home lives with son (autistic, covid +) PMHx of hypothyroid, sleep apnea, PUD, cervical OA PSH tracheal resection and reconstruction, uterous myoma, HTN,  presents to the ED c/o shortness of breath x 2 days. Patient underwent sleep study last Friday ,diagnosed with DARRELL, spiked fever the day after (1 week ago) associated with headache, and later on cough and diarrhea and tested positive for COVID at that time. pt reports  has been having fever and diarrhea since Dx. Pt notes onset of  worsening shortness of breath  for past 2 days.  Patient denies vomiting, rash , joint pain or any other acute complaints. Pt started ABx likley amoxicillin (does not remember te name ) and Decadron 6mg X5 days.      Of note : pt reports h/o massive GIB resulted in emergency intubation for 5 days. Pt reports her trachea was damaged as a result of intubation. s/p tracheal resection and reconstruction April 2016. She has undergone multiple surveillance bronchoscopies, Patient wants to be DNI given her background, however requested for cardiac resuscitation.        ED course : patient saturated on high 80s on RA on arrival , was placed on NRB and descalated to NC 4 liters, pt saturated 965 onm 4L NC       adm   assessment  --  pneumonia 2nd to covid 19, hypoxia, h/o hypothyroid, sleep apnea, PUD, cervical OA PSH tracheal resection and reconstruction, uterous myoma, HTN, gi bleed    plan  --  admit to med, rocephin, zithromax, vit c, vitamin d, zinc, pepcid, singulair, contact and airborne isolation, cont albuterol inhaler, cont supportive care with tylenol prn, robitussin prn and O2 via nasal canula as needed    covid-19 antibody test, resp viral panel, procalcitonin, D-dimer, esr, crp, ldh, ferritin, lactate, cbc, bmp, mg, phos, lipids, tsh, bld cx, ua, ucx, stool studies      pulm cons  gi cons 61 yo F from home lives with son (autistic, covid +) PMHx of hypothyroid, sleep apnea, PUD, cervical OA PSH tracheal resection and reconstruction, uterus myoma, HTN,  presents to the ED c/o shortness of breath x 2 days. Patient underwent sleep study last Friday ,diagnosed with DARRELL, spiked fever the day after (1 week ago) associated with headache, and later on cough and diarrhea and tested positive for COVID at that time. pt reports  has been having fever and diarrhea since Dx. Pt notes onset of  worsening shortness of breath  for past 2 days.  Patient denies vomiting, rash , joint pain or any other acute complaints. Pt started ABx likley amoxicillin (does not remember te name ) and Decadron 6mg X5 days.      Of note : pt reports h/o massive GIB resulted in emergency intubation for 5 days. Pt reports her trachea was damaged as a result of intubation. s/p tracheal resection and reconstruction April 2016. She has undergone multiple surveillance bronchoscopies, Patient wants to be DNI given her background, however requested for cardiac resuscitation.        ED course : patient saturated on high 80s on RA on arrival , was placed on NRB and descalated to NC 4 liters, pt saturated 965 onm 4L NC         assessment  --  pneumonia 2nd to covid 19, hypoxia, h/o hypothyroid, sleep apnea, PUD, cervical OA PSH tracheal resection and reconstruction, uterus myoma, HTN, gi bleed    plan  --  admit to med, rocephin, zithromax, vit c, vitamin d, zinc, pepcid, singulair, decadron, contact and airborne isolation, cont albuterol inhaler, cont supportive care with tylenol prn, robitussin prn and O2 via nasal canula as needed, dvt prophylaxis    covid-19 antibody test, resp viral panel, procalcitonin, D-dimer, esr, crp, ldh, ferritin, lactate, cbc, bmp, mg, phos, lipids, tsh, bld cx, ua, ucx, stool studies    icu eval-- new bipap    pulm cons  id cons  gi cons 59 yo F from home lives with son (autistic, covid +) PMHx of hypothyroid, sleep apnea, PUD, cervical OA PSH tracheal resection and reconstruction, uterus myoma, HTN,  presents to the ED c/o shortness of breath x 2 days. Patient underwent sleep study last Friday ,diagnosed with DARRELL, spiked fever the day after (1 week ago) associated with headache, and later on cough and diarrhea and tested positive for COVID at that time. pt reports  has been having fever and diarrhea since Dx. Pt notes onset of  worsening shortness of breath  for past 2 days.  Patient denies vomiting, rash , joint pain or any other acute complaints. Pt started ABx likley amoxicillin (does not remember te name ) and Decadron 6mg X5 days.      Of note : pt reports h/o massive GIB resulted in emergency intubation for 5 days. Pt reports her trachea was damaged as a result of intubation. s/p tracheal resection and reconstruction April 2016. She has undergone multiple surveillance bronchoscopies, Patient wants to be DNI given her background, however requested for cardiac resuscitation.         ED course : patient saturated on high 80s on RA on arrival , was placed on NRB and descalated to NC 4 liters, pt saturated 965 onm 4L NC         assessment  --  pneumonia 2nd to covid 19, hypoxia, h/o hypothyroid, sleep apnea, PUD, cervical OA PSH tracheal resection and reconstruction, uterus myoma, HTN, gi bleed    plan  --  admit to med, rocephin, zithromax, vit c, vitamin d, zinc, pepcid, singulair, decadron, contact and airborne isolation, cont albuterol inhaler, cont supportive care with tylenol prn, robitussin prn and O2 via nasal canula as needed, dvt prophylaxis    covid-19 antibody test, resp viral panel, procalcitonin, D-dimer, esr, crp, ldh, ferritin, lactate, cbc, bmp, mg, phos, lipids, tsh, bld cx, ua, ucx, stool studies    icu eval-- new bipap    pulm cons  id cons  gi cons

## 2020-12-01 NOTE — CONSULT NOTE ADULT - PROBLEM SELECTOR RECOMMENDATION 9
Pt currently saturating 91 % on 3 L NC, not in resp distress at this time, able to speak in full sentences , tested COVID +ve on 11/23   - inc NC to 4 L   - c/w Decadron 6 mg PO/IV   - would not recommend Abx at this time, check Procal , if high can c/w Abx   - check COVID Ab to see pt will qualify for Remdesivir   - c/w Supplemental O2 as needed   - check Pulse Ox q4   - D-dimer <150  but BMI > 30 ,start on lovenox 40 mg BID   - f/u CRP ,ferritin and PCT   - Pt doesn't need additional Resp support at this time so doesn't need ICU at this time. Re-consult if pt need BIPAP/HFNC

## 2020-12-01 NOTE — H&P ADULT - NSICDXPASTMEDICALHX_GEN_ALL_CORE_FT
PAST MEDICAL HISTORY:  Arthritis right shoulder    Arthritis of shoulder region, right     Constipation     Fatty liver     Gallstones     Gastric ulcer February 2016- h/o GI bleed which patient had to be intubated Jan 2016    History of tracheal stenosis     Hypothyroidism     Kidney stones     Lumbar back pain     Obesity     Osteoporosis     Other specified diseases of upper respiratory tract     Thyroid disease

## 2020-12-01 NOTE — ED PROVIDER NOTE - MUSCULOSKELETAL, MLM
Spine appears normal, range of motion is not limited, no muscle or joint tenderness, no edema or calf tenderness

## 2020-12-01 NOTE — H&P ADULT - NSICDXFAMILYHX_GEN_ALL_CORE_FT
FAMILY HISTORY:  Father  Still living? Unknown  Family history of MI (myocardial infarction), Age at diagnosis: 81-90    Sibling  Still living? No  Family history of MI (myocardial infarction), Age at diagnosis: 31-40

## 2020-12-01 NOTE — H&P ADULT - NSHPPHYSICALEXAM_GEN_ALL_CORE
unk
CONSTITUTIONAL: in mild respiratory distress while talking  ENMT: Airway patent, Nasal mucosa clear. Mouth with normal mucosa. Throat has no vesicles, no oropharyngeal exudates and uvula is midline.  EYES: Clear bilaterally, pupils equal, round and reactive to light. EOMI.  CARDIAC: Normal rate, regular rhythm.  Heart sounds S1, S2.  No murmurs, rubs or gallops   RESPIRATORY: CTAB , tachypnea  MUSCULOSKELETAL: Spine appears normal, range of motion is not limited, no muscle or joint tenderness  EXTREMITIES: No edema, cyanosis or deformity   NEUROLOGICAL: Alert and oriented, no focal deficits, no motor or sensory deficits.  SKIN: No rash, skin turgor    ABD : bilateral suprapubic abdominal mass (uterus fibroma), NT , ND

## 2020-12-01 NOTE — CONSULT NOTE ADULT - SUBJECTIVE AND OBJECTIVE BOX
Patient is a 60y old  Female who presents with a chief complaint of COVID PNA requiring oxygen supplementation (01 Dec 2020 13:01)      HPI:  59 yo F from home lives with son (autistic, covid +) PMHx of hypothyroid, sleep apnea, PUD, cervical OA PSH tracheal resection and reconstruction, uterous myoma, HTN,  presents to the ED c/o shortness of breath x 2 days. Patient underwent sleep study on 20th Nov  and was diagnosed with DARRELL,  associated with headache, and later on cough and diarrhea and tested positive for COVID on Nov 23rd.  pt reports  has been having fevers daily in range of 101-102 F  and diarrhea since then . Pt states her SOB got worse this am so she decided to come to hospital .  Patient currently having some chest discomfort but denies vomiting, rash , joint pain or any other acute complaints.   Of note : pt reports h/o massive GIB resulted in emergency intubation for 5 days. Pt reports her trachea was damaged as a result of intubation. s/p tracheal resection and reconstruction April 2016. She has undergone multiple surveillance bronchoscopies, Patient wants to be DNI given her background, however requested for cardiac resuscitation.        ED course : patient saturated on high 80s on RA on arrival , was placed on NRB and descalated to NC 4 liters, pt saturated 96%  on 4L NC      CXR : patchy basilar infiltrates     (01 Dec 2020 13:01)      Allergies    No Known Allergies    Intolerances      MEDICATIONS  (STANDING):  azithromycin  IVPB      azithromycin  IVPB 500 milliGRAM(s) IV Intermittent once  cefTRIAXone   IVPB      dexAMETHasone  Injectable 6 milliGRAM(s) IV Push daily  heparin   Injectable 5000 Unit(s) SubCutaneous every 8 hours  levothyroxine 100 MICROGram(s) Oral daily    MEDICATIONS  (PRN):  acetaminophen   Tablet .. 650 milliGRAM(s) Oral every 6 hours PRN Temp greater or equal to 38C (100.4F), Mild Pain (1 - 3)      Daily Height in cm: 162.56 (01 Dec 2020 09:53)    Daily     Drug Dosing Weight  Height (cm): 162.6 (01 Dec 2020 09:53)  Weight (kg): 85.7 (01 Dec 2020 09:53)  BMI (kg/m2): 32.4 (01 Dec 2020 09:53)  BSA (m2): 1.91 (01 Dec 2020 09:53)    PAST MEDICAL & SURGICAL HISTORY:  Lumbar back pain    Osteoporosis    History of tracheal stenosis    Other specified diseases of upper respiratory tract    Hypothyroidism    Kidney stones    Gallstones    Fatty liver    Thyroid disease    Obesity    Constipation    Gastric ulcer  February 2016- h/o GI bleed which patient had to be intubated Jan 2016    Arthritis of shoulder region, right    Arthritis  right shoulder    S/P bronchoscopy  2017    S/P bronchoscopy  5/16    Hyperparathyroidism  2012 parathyroidectomy    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    Gastric ulcer  &quot;four endoscopies&quot; for diagnosis of gastric ulcer in February 2016        FAMILY HISTORY:  Family history of MI (myocardial infarction) (Father, Sibling)        SOCIAL HISTORY: former smoker       REVIEW OF SYSTEMS:    CONSTITUTIONAL: Fever +   EYES: No eye pain, visual disturbances, or discharge  ENMT:  No difficulty hearing, tinnitus, vertigo; No sinus or throat pain, no loss of taste sensation   NECK: No pain or stiffness  RESPIRATORY:  cough + , SOB +   CARDIOVASCULAR: Chest pressure + , no palpitations, dizziness, or leg swelling  GASTROINTESTINAL: No abdominal or epigastric pain. No nausea, vomiting, or hematemesis;  Diarrhea +   GENITOURINARY: No dysuria, frequency, hematuria, or incontinence  NEUROLOGICAL: No headaches, memory loss, loss of strength, numbness, or tremors  SKIN: No itching, burning, rashes, or lesions   LYMPH NODES: No enlarged glands  ENDOCRINE: No heat or cold intolerance; No hair loss  MUSCULOSKELETAL: No joint pain or swelling; No muscle, back, or extremity pain  PSYCHIATRIC: No depression, anxiety, mood swings, or difficulty sleeping  HEME/LYMPH: No easy bruising, or bleeding gums        ICU Vital Signs Last 24 Hrs  T(C): 37.6 (01 Dec 2020 16:37), Max: 37.8 (01 Dec 2020 09:53)  T(F): 99.7 (01 Dec 2020 16:37), Max: 100 (01 Dec 2020 09:53)  HR: 81 (01 Dec 2020 16:37) (75 - 91)  BP: 147/87 (01 Dec 2020 16:37) (145/79 - 161/83)  BP(mean): --  ABP: --  ABP(mean): --  RR: 20 (01 Dec 2020 16:37) (20 - 20)  SpO2: 97% (01 Dec 2020 16:37) (96% - 98%)          I&O's Detail      PHYSICAL EXAM:    GENERAL: Not in acute resp distress , well-groomed, well-developed  HEAD:  Atraumatic, Normocephalic  EYES: EOMI, PERRLA, conjunctiva and sclera clear  ENMT: No tonsillar erythema, exudates, or enlargement; Moist mucous membranes, Good dentition, No lesions  NECK: Supple, No JVD, Normal thyroid  NERVOUS SYSTEM:  Alert & Oriented X3, Good concentration; Motor Strength 5/5 B/L upper and lower extremities; DTRs 2+ intact and symmetric  CHEST/LUNG: dec bs at the bases; No rales, rhonchi, wheezing, or rubs  HEART: Regular rate and rhythm; No murmurs, rubs, or gallops  ABDOMEN: Soft, Nontender, Nondistended; Bowel sounds present  EXTREMITIES:  2+ Peripheral Pulses, No clubbing, cyanosis, or edema  LYMPH: No lymphadenopathy noted  SKIN: No rashes or lesions    LABS:  CBC Full  -  ( 01 Dec 2020 11:26 )  WBC Count : 11.17 K/uL  RBC Count : 4.30 M/uL  Hemoglobin : 13.4 g/dL  Hematocrit : 41.7 %  Platelet Count - Automated : 321 K/uL  Mean Cell Volume : 97.0 fl  Mean Cell Hemoglobin : 31.2 pg  Mean Cell Hemoglobin Concentration : 32.1 gm/dL  Auto Neutrophil # : 9.55 K/uL  Auto Lymphocyte # : 1.08 K/uL  Auto Monocyte # : 0.41 K/uL  Auto Eosinophil # : 0.00 K/uL  Auto Basophil # : 0.01 K/uL  Auto Neutrophil % : 85.4 %  Auto Lymphocyte % : 9.7 %  Auto Monocyte % : 3.7 %  Auto Eosinophil % : 0.0 %  Auto Basophil % : 0.1 %    12-01    137  |  102  |  15  ----------------------------<  141<H>  3.9   |  29  |  0.71    Ca    10.8<H>      01 Dec 2020 11:26    TPro  8.0  /  Alb  2.9<L>  /  TBili  0.3  /  DBili  x   /  AST  24  /  ALT  30  /  AlkPhos  57  12-01    CAPILLARY BLOOD GLUCOSE      POCT Blood Glucose.: 137 mg/dL (01 Dec 2020 09:57)    PT/INR - ( 01 Dec 2020 11:26 )   PT: 12.7 sec;   INR: 1.07 ratio         PTT - ( 01 Dec 2020 11:26 )  PTT:32.4 sec      RADIOLOGY:    < from: Xray Chest 1 View-PORTABLE IMMEDIATE (12.01.20 @ 12:05) >  IMPRESSION:    Patchy basilar infiltrates. Differential diagnosis includes Covid-19 pneumonia among other possibilities.    < end of copied text >

## 2020-12-01 NOTE — ED PROVIDER NOTE - CARE PLAN
Principal Discharge DX:	COVID-19 virus infection  Secondary Diagnosis:	Viral pneumonia  Secondary Diagnosis:	Hypoxia

## 2020-12-01 NOTE — CONSULT NOTE ADULT - GASTROINTESTINAL DETAILS
bowel sounds normal/no organomegaly/no rigidity/nontender/no guarding/no distention/soft/no masses palpable

## 2020-12-01 NOTE — CONSULT NOTE ADULT - PROBLEM SELECTOR RECOMMENDATION 2
Chief Complaint   Patient presents with   • Gyn Exam     Last pap 2015-ASCUS HPV neg, post menopausal.          CC: Annual exam     HISTORY OF PRESENT ILLNESS: Neema Hanna is a 57 year old       female presenting today for routine GYN (gynecologic) exam. She reports vaginal dryness. She requests options for natural products. She prefers not to use estrogen.    PAST MEDICAL HISTORY:    Heart palpitations                                              Comment: chronic, intermittent.  negative cardiac eval    Asthma                                                          Comment: allergy-induced, no medications    Vertigo                                                       PAST SURGICAL HISTORY:    APPENDECTOMY                                              WISDOM TOOTH EXTRACTION                                       REMOVAL OF TONSILS,<11 Y/O                                    Current Outpatient Medications   Medication Sig Dispense Refill   • Unable to Find Medication 1 time.     • VITAMIN D, CHOLECALCIFEROL, PO Take  by mouth.     • Acetaminophen-Caffeine (EXCEDRIN TENSION HEADACHE PO) Take  by mouth.       No current facility-administered medications for this visit.        ALLERGIES:  No Known Allergies    Social History     Tobacco Use   • Smoking status: Never Smoker   • Smokeless tobacco: Never Used   Substance Use Topics   • Alcohol use: No     Alcohol/week: 0.0 standard drinks     Comment: very rare   • Drug use: No        Sexually Active: Yes             Partners with: Male       Birth Control/Protection: Post-menopausal       Comment: reports dyspareunia    PAST GYNECOLOGIC HISTORY:  Patient's last menstrual period was 2010.  Last Pap smear was ASCUS, neg hr hpv.      PAST OBSTETRICAL HISTORY:  OB History    Para Term  AB Living   2 2 2 0 0 2   SAB TAB Ectopic Molar Multiple Live Births   0 0 0 0 0 2      # Outcome Date GA Lbr Laci/2nd Weight Sex Delivery Anes PTL Lv    2 Term 1995   3.941 kg F Vag-Spont  N HAN      Name: Candice   1 Term 1992   3.884 kg M Vag-Spont  N HAN      Name: Liborio       HEALTH MAINTENANCE:  Health Maintenance   Topic Date Due   • Shingles Vaccine (1 of 2) 09/13/2012   • Influenza Vaccine (1) 09/01/2019   • DTaP/Tdap/Td Vaccine (2 - Td) 10/23/2020   • Cervical Cancer Screening HPV CO-Testing  11/18/2020   • Breast Cancer Screening  12/18/2020   • Depression Screening  12/19/2020   • Colorectal Cancer Screening-Colonoscopy  09/20/2023   • Hepatitis C Screening  Completed   • Meningococcal Vaccine  Aged Out   • Pneumococcal Vaccine 0-64  Aged Out       REVIEW OF SYSTEMS:    Negative for any significant problems with the following:    General: Unexplained fevers or chills.  Cardiovascular: Chest pain, palpitations or edema.  Respiratory: Shortness of breath.  Breasts: Dominant masses or nipple discharge.  Gastrointestinal: Nausea/vomiting, diarrhea/constipation.  Urinary: Dysuria/hematuria.  Reproductive: Vaginal discharge, burning, odor or itching.  Skin: New or changing nevi.  Neurologic: Headaches.  Musculoskeletal: New aches or pains.    DEPRESSION SCREENING:  Recent PHQ 2/9 Score    PHQ 2:  Date Adult PHQ 2 Score   12/19/2019 0       PHYSICAL EXAM:  Blood pressure 100/60, height 5' 3\" (1.6 m), weight 54.6 kg, last menstrual period 11/07/2010., Body mass index is 21.32 kg/m²., Patient's last menstrual period was 11/07/2010.  General: Well developed, well nourished, in no acute distress.  Psychiatric: Alert and cooperative; normal mood and affect.  HEENT: Atraumatic, normocephalic.  Cardiovascular: No lower extremity edema.  Respiratory: Unlabored respiratory effort.  Breasts:  Symmetric.  There are no skin changes, dominant masses, nipple discharge, or axillary lymphadenopathy bilaterally.  Abdomen: Abdomen soft and non-tender without masses, hepatosplenomegaly or hernias noted.  Neurologic: No focal deficits.   Skin: Warm and dry without  lesions.  Extremities: Without edema or cyanosis.  Pelvic Exam:  EXTERNAL: Normal female external genitalia, no lesions, normal hair distribution.  URETHRAL MEATUS: Normal-appearing urethra without lesions or prolapse.  URETHRA: No masses or tenderness.   BLADDER: Nontender. Nondistended.  VAGINA: atrophic. Normal physiologic discharge. No lesions. Well supported. Pap smear performed.  CERVIX: Appears to be normal without any gross lesions.  UTERUS: Small, firm and nontender, in mid position.  ADNEXA: No masses, enlargement, or tenderness.  ANUS AND PERINEUM: No lesions.      ASSESSMENT:  1. Well woman exam with routine gynecological exam  Return in one year    2. Vaginal dryness  Pamphlet provided on vaginal renewal program/woman's touch   Samples of V-care and Uberlube provided          Abeba Núñez MD            diagnosed with Sleep apnea on NOv 20 but not started on CPAP yet   - will not recommend starting new CPAP on this admission   - c/w Supplemental O2   - monitor Pulse Ox q4 for now

## 2020-12-01 NOTE — ED PROVIDER NOTE - CLINICAL SUMMARY MEDICAL DECISION MAKING FREE TEXT BOX
60F with shortness of breath. Post hoc high 80s when not on O2, up to 100% with suppmental O2. Will continue supplmental O2, labs, CXR and admit.

## 2020-12-01 NOTE — H&P ADULT - ASSESSMENT
59 yo F from home lives with son (autistic, covid +) PMHx of hypothyroid, sleep apnea, PUD, cervical OA PSH tracheal resection and reconstruction, uterous myoma, HTN,  presents to the ED c/o shortness of breath x 2 days. Patient underwent sleep study last Friday ,diagnosed with DARRELL, spiked fever the day after 10.6 (1 week ago) associated with headache, and later on cough and diarrhea and tested positive for COVID at that time. Admitted for COVID pNA requiring O2 supplantation.

## 2020-12-01 NOTE — H&P ADULT - HISTORY OF PRESENT ILLNESS
59 yo F from home lives with  PMHx of hypothyroid, sleep apnea presents to the ED c/o shortness of breath x 2 days. Patient spiked fever 1 week ago associated with cough , tseted positive for  Pt notes she was Dx with COVID x 1 week ago and has been having fever and diarrhea since Dx. Pt notes onset of nonproductive cough and worsening shortness of breath x 2 days. Pt denies HA, chest pain, vomiting, rash, or any other 61 yo F from home lives with son (autistic, covid +) PMHx of hypothyroid, sleep apnea, PUD, cervical OA PSH tracheal resection and reconstruction, uterous myoma, HTN,  presents to the ED c/o shortness of breath x 2 days. Patient underwent sleep study last Friday ,diagnosed with DARRELL, spiked fever the day after 10.6 (1 week ago) associated with headache, and later on cough and diarrhea and tested positive for COVID at that time. pt reports  has been having fever and diarrhea since Dx. Pt notes onset of  worsening shortness of breath  for past 2 days.  Patient denies vomiting, rash , joint pain or any other acute complaints.    Of note : pt reports h/o massive GIB resulted in emergency intubation for 5 days. Pt reports her trachea was damaged as a result of intubation. s/p tracheal resection and reconstruction April 2016. She has undergone multiple surveillance bronchoscopies, Patient wants to be DNI given her background, however requested for cardiac resuscitation.        ED course : patient saturated on high 80s on RA on arrival , was placed on NRB and descalated to NC 4 liters, pt saturated 965 onm 4L NC   on my evaluation patient was saturating 97% on 2 L NC however tachypneic while talking or getting exited.    61 yo F from home lives with son (autistic, covid +) PMHx of hypothyroid, sleep apnea, PUD, cervical OA PSH tracheal resection and reconstruction, uterous myoma, HTN,  presents to the ED c/o shortness of breath x 2 days. Patient underwent sleep study last Friday ,diagnosed with DARRELL, spiked fever the day after 10.6 (1 week ago) associated with headache, and later on cough and diarrhea and tested positive for COVID at that time. pt reports  has been having fever and diarrhea since Dx. Pt notes onset of  worsening shortness of breath  for past 2 days.  Patient denies vomiting, rash , joint pain or any other acute complaints. Pt started ABx likley amoxicillin (does not remember te name ) and Decadron 6mg X5 days.      Of note : pt reports h/o massive GIB resulted in emergency intubation for 5 days. Pt reports her trachea was damaged as a result of intubation. s/p tracheal resection and reconstruction April 2016. She has undergone multiple surveillance bronchoscopies, Patient wants to be DNI given her background, however requested for cardiac resuscitation.        ED course : patient saturated on high 80s on RA on arrival , was placed on NRB and descalated to NC 4 liters, pt saturated 965 onm 4L NC   on my evaluation patient was saturating 97% on 2 L NC however tachypneic while talking or getting exited.    CXR : patchy basilar infiltrates    59 yo F from home lives with son (who is autistic, covid +) PMHx of hypothyroid, sleep apnea, PUD, cervical OA PSH tracheal resection and reconstruction, uterous myoma, HTN,  presents to the ED c/o shortness of breath x 2 days. Patient underwent sleep studyfrom 11/20 to 11/21 ,diagnosed with DARRELL, spiked fever the day after 101.6 associated with headache, and later on cough and diarrhea and tested positive for COVID on 111/23rd. pt has been having fever and diarrhea since Dx. Pt notes onset of  worsening shortness of breath  for past 2 days.  Patient denies vomiting, rash , joint pain or any other acute complaints. Pt started ABx likley amoxicillin (does not remember te name ) and Decadron 6mg X5 days.      Of note : pt reports h/o massive GIB resulted in emergency intubation for 5 days. Pt reports her trachea was damaged as a result of intubation. s/p tracheal resection and reconstruction April 2016. She has undergone multiple surveillance bronchoscopies, Patient wants to be DNI given her background, however requested for cardiac resuscitation.        ED course : patient saturated on high 80s on RA on arrival , was placed on NRB and descalated to NC 4 liters, pt saturated 965 onm 4L NC   on my evaluation patient was saturating 97% on 2 L NC however tachypneic while talking or getting exited.    CXR : patchy basilar infiltrates    59 yo F from home lives with son (who is autistic, covid +) PMHx of hypothyroid, sleep apnea, PUD, cervical OA PSH tracheal resection and reconstruction, uterous myoma, HTN,  presents to the ED c/o shortness of breath x 2 days. Patient underwent sleep studyfrom 11/20 to 11/21 ,diagnosed with DARRELL, spiked fever the day after 101.6 associated with headache, and later on cough and diarrhea and tested positive for COVID on 111/23rd. pt has been having fever and diarrhea since Dx. Pt notes onset of  worsening shortness of breath  for past 2 days.  Patient denies vomiting, rash , joint pain or any other acute complaints. Pt started ABx likley amoxicillin (does not remember te name ) and Decadron 6mg X5 days.      Of note : pt reports h/o massive GIB resulted in emergency intubation for 5 days. Pt reports her trachea was damaged as a result of intubation. s/p tracheal resection and reconstruction April 2016. She has undergone multiple surveillance bronchoscopies, Patient wants to be DNI given her background, however requested for cardiac resuscitation.        ED course : patient saturated on high 80s on RA on arrival , was placed on NRB and descalated to NC 4 liters, pt saturated 96% on 4L NC,  on my evaluation patient was saturating 97% on 2 L NC however tachypneic while talking or getting exited.    CXR : patchy basilar infiltrates

## 2020-12-01 NOTE — ED ADULT NURSE NOTE - NSIMPLEMENTINTERV_GEN_ALL_ED
No
Implemented All Universal Safety Interventions:  [place] to call system. Call bell, personal items and telephone within reach. Instruct patient to call for assistance. Room bathroom lighting operational. Non-slip footwear when patient is off stretcher. Physically safe environment: no spills, clutter or unnecessary equipment. Stretcher in lowest position, wheels locked, appropriate side rails in place.

## 2020-12-01 NOTE — ED ADULT TRIAGE NOTE - CHIEF COMPLAINT QUOTE
bib/ems notification for shortness of breath + covid 11/23 . pt reports fever , headache , body aches last week and was seen at the urgent care .  c/o cough x 2 days w/ difficulty breathing today , O2 sat RA = 90% per ems

## 2020-12-01 NOTE — CONSULT NOTE ADULT - ASSESSMENT
COVID -19 infection /pneumonia  Fevers  Leukocytosis - mild    Plan - Cont Rocephin 1 gm iv q24hrs  Cont Azithromycin 500mgs iv q24 hrs  Cont Decadron 6mgs iv q24hrs  if her antibody test is negative tomorrow am will start Remdesivir.

## 2020-12-01 NOTE — H&P ADULT - PROBLEM SELECTOR PLAN 3
pt reports h/o massive GIB resulted in emergency intubation for 5 days. Pt reports her trachea was damaged as a result of intubation. s/p tracheal resection and reconstruction April 2016. She has undergone multiple surveillance bronchoscopies, Patient wants to be DNI given her background, however requested for cardiac resuscitation

## 2020-12-01 NOTE — H&P ADULT - PROBLEM SELECTOR PLAN 6
c/w Lovenox 40mg qd   c/w PPi prophylactic c/w heparin 5000mg qd gieven h/o PUD   c/w PPi for GI prophylaxis c/w Lovenox 490mg bid given BMI>30 in setting of COVID  c/w PPi for GI prophylaxis

## 2020-12-01 NOTE — CONSULT NOTE ADULT - SUBJECTIVE AND OBJECTIVE BOX
HPI:  59 yo F from home lives with son (who is autistic, covid +) PMHx of hypothyroid, sleep apnea, PUD, cervical OA PSH tracheal resection and reconstruction, uterous myoma, HTN,  presents to the ED c/o shortness of breath x 2 days. Patient underwent sleep studyfrom 11/20 to 11/21 ,diagnosed with DARRELL, spiked fever the day after 101.6 associated with headache, and later on cough and diarrhea and tested positive for COVID on 111/23rd. pt has been having fever and diarrhea since Dx. Pt notes onset of  worsening shortness of breath  for past 2 days.  Patient denies vomiting, rash , joint pain or any other acute complaints. Pt started ABx likley amoxicillin (does not remember te name ) and Decadron 6mg X5 days.    Of note : pt reports h/o massive GIB resulted in emergency intubation for 5 days. Pt reports her trachea was damaged as a result of intubation. s/p tracheal resection and reconstruction April 2016. She has undergone multiple surveillance bronchoscopies, Patient wants to be DNI given her background, however requested for cardiac resuscitation.   ED course : patient saturated on high 80s on RA on arrival , was placed on NRB and descalated to NC 4 liters, pt saturated 96% on 4L NC,  on my evaluation patient was saturating 97% on 2 L NC however tachypneic while talking or getting exited.       History as above,      PAST MEDICAL & SURGICAL HISTORY:  Lumbar back pain    Osteoporosis    History of tracheal stenosis    Other specified diseases of upper respiratory tract    Hypothyroidism    Kidney stones    Gallstones    Fatty liver    Thyroid disease    Obesity    Constipation    Gastric ulcer  February 2016- h/o GI bleed which patient had to be intubated Jan 2016    Arthritis of shoulder region, right    Arthritis  right shoulder    S/P bronchoscopy  2017    S/P bronchoscopy  5/16    Hyperparathyroidism  2012 parathyroidectomy    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    Gastric ulcer        No Known Allergies      Meds:  acetaminophen   Tablet .. 650 milliGRAM(s) Oral every 6 hours PRN  azithromycin  IVPB      cefTRIAXone   IVPB      dexAMETHasone  Injectable 6 milliGRAM(s) IV Push daily  enoxaparin Injectable 40 milliGRAM(s) SubCutaneous every 12 hours  levothyroxine 100 MICROGram(s) Oral daily      SOCIAL HISTORY:  Smoker:  YES / NO        PACK YEARS:                         WHEN QUIT?  ETOH use:  YES / NO               FREQUENCY / QUANTITY:  Ilicit Drug use:  YES / NO  Occupation:  Assisted device use (Cane / Walker):  Live with:    FAMILY HISTORY:  Family history of MI (myocardial infarction) (Father, Sibling)        VITALS:  Vital Signs Last 24 Hrs  T(C): 36.7 (01 Dec 2020 18:13), Max: 37.8 (01 Dec 2020 09:53)  T(F): 98 (01 Dec 2020 18:13), Max: 100 (01 Dec 2020 09:53)  HR: 72 (01 Dec 2020 18:13) (72 - 91)  BP: 127/69 (01 Dec 2020 18:13) (127/69 - 161/83)  BP(mean): --  RR: 24 (01 Dec 2020 18:13) (20 - 24)  SpO2: 94% (01 Dec 2020 18:13) (94% - 98%)    LABS/DIAGNOSTIC TESTS:                          13.4   11.17 )-----------( 321      ( 01 Dec 2020 11:26 )             41.7     WBC Count: 11.17 K/uL (12-01 @ 11:26)      12-01    137  |  102  |  15  ----------------------------<  141<H>  3.9   |  29  |  0.71    Ca    10.8<H>      01 Dec 2020 11:26    TPro  8.0  /  Alb  2.9<L>  /  TBili  0.3  /  DBili  x   /  AST  24  /  ALT  30  /  AlkPhos  57  12-01          LIVER FUNCTIONS - ( 01 Dec 2020 11:26 )  Alb: 2.9 g/dL / Pro: 8.0 g/dL / ALK PHOS: 57 U/L / ALT: 30 U/L DA / AST: 24 U/L / GGT: x             PT/INR - ( 01 Dec 2020 11:26 )   PT: 12.7 sec;   INR: 1.07 ratio         PTT - ( 01 Dec 2020 11:26 )  PTT:32.4 sec    LACTATE:    ABG -     CULTURES:       RADIOLOGY:< from: Xray Chest 1 View-PORTABLE IMMEDIATE (12.01.20 @ 12:05) >  EXAM:  XR CHEST PORTABLE IMMED 1V                            PROCEDURE DATE:  12/01/2020          INTERPRETATION:  Chest one view    HISTORY: Shortness of breath, cough and fever    COMPARISON STUDY: 11/6/2018    Frontal expiratory view of the chest shows the heart to be similar in size. The lungs show patchy infiltrates of the lung bases with similar small left effusion and there is no evidence of pneumothorax nor pleural effusion.    IMPRESSION:  Patchy basilar infiltrates. Differential diagnosis includes Covid-19 pneumonia among other possibilities.    Thank you for the courtesy of this referral.            MICHELLE ARTHUR MD; Attending Interventional Radiologist  This document has been electronically signed. Dec  1 2020 12:25PM    < end of copied text >        ROS  [  ] UNABLE TO ELICIT               HPI:  59 yo F from home lives with son (who is autistic, covid +) PMHx of hypothyroid, sleep apnea, PUD, cervical OA PSH tracheal resection and reconstruction, uterous myoma, HTN,  presents to the ED c/o shortness of breath x 2 days. Patient underwent sleep studyfrom 11/20 to 11/21 ,diagnosed with DARRELL, spiked fever the day after 101.6 associated with headache, and later on cough and diarrhea and tested positive for COVID on 111/23rd. pt has been having fever and diarrhea since Dx. Pt notes onset of  worsening shortness of breath  for past 2 days.  Patient denies vomiting, rash , joint pain or any other acute complaints. Pt started ABx likley amoxicillin (does not remember te name ) and Decadron 6mg X5 days.    Of note : pt reports h/o massive GIB resulted in emergency intubation for 5 days. Pt reports her trachea was damaged as a result of intubation. s/p tracheal resection and reconstruction April 2016. She has undergone multiple surveillance bronchoscopies, Patient wants to be DNI given her background, however requested for cardiac resuscitation.   ED course : patient saturated on high 80s on RA on arrival , was placed on NRB and descalated to NC 4 liters, pt saturated 96% on 4L NC,  on my evaluation patient was saturating 97% on 2 L NC however tachypneic while talking or getting exited.       History as above, Pt who has been sick for approx 9 days after getting a sleep study in the outpatient setting but also whose son was sick at the same time , she got a COVID test and came back positive on 11/23/20. She has been having a dry cough , some chest tightness and SOB and fevers on a daily basis since then without any improvement and so came to the hospital. Here she was found to have some bibasilar infiltrates R >L on my review of her CXR.  Her antibody test is still pending but testing, she is currently on Rocephin/azithromycin and decadron.      PAST MEDICAL & SURGICAL HISTORY:  Lumbar back pain    Osteoporosis    History of tracheal stenosis    Other specified diseases of upper respiratory tract    Hypothyroidism    Kidney stones    Gallstones    Fatty liver    Thyroid disease    Obesity    Constipation    Gastric ulcer  February 2016- h/o GI bleed which patient had to be intubated Jan 2016    Arthritis of shoulder region, right    Arthritis  right shoulder    S/P bronchoscopy  2017    S/P bronchoscopy  5/16    Hyperparathyroidism  2012 parathyroidectomy    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    Gastric ulcer        No Known Allergies      Meds:  acetaminophen   Tablet .. 650 milliGRAM(s) Oral every 6 hours PRN  azithromycin  IVPB      cefTRIAXone   IVPB      dexAMETHasone  Injectable 6 milliGRAM(s) IV Push daily  enoxaparin Injectable 40 milliGRAM(s) SubCutaneous every 12 hours  levothyroxine 100 MICROGram(s) Oral daily      SOCIAL HISTORY:  Smoker:  no  ETOH use:  no      FAMILY HISTORY:  Family history of MI (myocardial infarction) (Father, Sibling)        VITALS:  Vital Signs Last 24 Hrs  T(C): 36.7 (01 Dec 2020 18:13), Max: 37.8 (01 Dec 2020 09:53)  T(F): 98 (01 Dec 2020 18:13), Max: 100 (01 Dec 2020 09:53)  HR: 72 (01 Dec 2020 18:13) (72 - 91)  BP: 127/69 (01 Dec 2020 18:13) (127/69 - 161/83)  BP(mean): --  RR: 24 (01 Dec 2020 18:13) (20 - 24)  SpO2: 94% (01 Dec 2020 18:13) (94% - 98%)    LABS/DIAGNOSTIC TESTS:                          13.4   11.17 )-----------( 321      ( 01 Dec 2020 11:26 )             41.7     WBC Count: 11.17 K/uL (12-01 @ 11:26)      12-01    137  |  102  |  15  ----------------------------<  141<H>  3.9   |  29  |  0.71    Ca    10.8<H>      01 Dec 2020 11:26    TPro  8.0  /  Alb  2.9<L>  /  TBili  0.3  /  DBili  x   /  AST  24  /  ALT  30  /  AlkPhos  57  12-01          LIVER FUNCTIONS - ( 01 Dec 2020 11:26 )  Alb: 2.9 g/dL / Pro: 8.0 g/dL / ALK PHOS: 57 U/L / ALT: 30 U/L DA / AST: 24 U/L / GGT: x             PT/INR - ( 01 Dec 2020 11:26 )   PT: 12.7 sec;   INR: 1.07 ratio         PTT - ( 01 Dec 2020 11:26 )  PTT:32.4 sec    LACTATE:    ABG -     CULTURES:       RADIOLOGY:< from: Xray Chest 1 View-PORTABLE IMMEDIATE (12.01.20 @ 12:05) >  EXAM:  XR CHEST PORTABLE IMMED 1V                            PROCEDURE DATE:  12/01/2020          INTERPRETATION:  Chest one view    HISTORY: Shortness of breath, cough and fever    COMPARISON STUDY: 11/6/2018    Frontal expiratory view of the chest shows the heart to be similar in size. The lungs show patchy infiltrates of the lung bases with similar small left effusion and there is no evidence of pneumothorax nor pleural effusion.    IMPRESSION:  Patchy basilar infiltrates. Differential diagnosis includes Covid-19 pneumonia among other possibilities.    Thank you for the courtesy of this referral.            MICHELLE ARTHUR MD; Attending Interventional Radiologist  This document has been electronically signed. Dec  1 2020 12:25PM    < end of copied text >        ROS  [  ] UNABLE TO ELICIT

## 2020-12-01 NOTE — H&P ADULT - PROBLEM SELECTOR PLAN 1
presented with SOB , Cough, fever , diarrhea   patient with positive covid testX1 week    CXR  New bilateral lung basilar pathcy infiltrates suggesting bilateral pneumonia.   saturating 88% on RA    pt was placed on NRB and deescalated to NC 4 L in Ed , saturates 96%, on 2 L Nc saturates 96% however with tachypnea  -Will send ESR, CRP, Procalcitonin and other COVID markers   -Tylenol PRN for fever  -D- Dimer not elevated, Will start patient on prophylactic Lovenox  -patient exhibiting signs of hypoxia, will consider administration of steroids and/or Remdesivir  -continue to trend D-dimer, CRP, LDH, Troponin, Ferritin, CPK.  - f/u COVID Abs presented with SOB , Cough, fever , diarrhea   patient with positive covid testX1 week    CXR  New bilateral lung basilar patchy infiltrates suggesting bilateral pneumonia.   saturating 88% on RA    pt was placed on NRB and deescalated to NC 4 L in Ed , saturates 96%, on 2 L Nc saturates 96% however with tachypnea  -Will send ESR, CRP, Procalcitonin and other COVID markers   -Tylenol PRN for fever  -D- Dimer not elevated, Will start patient on prophylactic Lovenox  -patient exhibiting signs of hypoxia, will consider administration of steroids and/or Remdesivir  -continue to trend D-dimer, CRP, LDH, Troponin, Ferritin, CPK.  - f/u COVID Abs  - started on Abx for CAP Zithromax and Rocephin   - Pulm : Dr Ta presented with SOB , Cough, fever , diarrhea   patient with positive covid testX1 week    CXR  New bilateral lung basilar patchy infiltrates suggesting bilateral pneumonia.   saturating 88% on RA    pt was placed on NRB and deescalated to NC 4 L in Ed , saturates 96%, on 2 L Nc saturates 96% however with tachypnea  -Will send ESR, CRP, Procalcitonin and other COVID markers   -Tylenol PRN for fever  -D- Dimer not elevated, Will start patient on prophylactic Lovenox  -patient exhibiting signs of hypoxia, will consider administration of steroids and/or Remdesivir  -continue to trend D-dimer, CRP, LDH, Troponin, Ferritin, CPK.  - f/u COVID Abs  - started on Abx for CAP Zithromax and Rocephin   - Pulm : Dr Ta   - ID : Dr Engle

## 2020-12-01 NOTE — CONSULT NOTE ADULT - ASSESSMENT
59 yo F from home lives with son (autistic, covid +) PMHx of hypothyroid, sleep apnea, PUD, cervical OA PSH tracheal resection and reconstruction, uterous myoma, HTN,  presents to the ED c/o shortness of breath x 2 days. Patient underwent sleep study on 20th Nov  and was diagnosed with DARRELL,  associated with headache, and later on cough and diarrhea and tested positive for COVID on Nov 23rd.  pt reports  has been having fevers daily in range of 101-102 F  and diarrhea since then . Pt states her SOB got worse this am so she decided to come to hospital .  Patient currently having some chest discomfort but denies vomiting, rash , joint pain or any other acute complaints.    ED course : patient saturated on high 80s on RA on arrival , was placed on NRB and lowered  to NC 4 liters, pt saturated 96%  on 4L NC   Pt was diagnosed with Sleep Apnea but hasnt started on  CPAP yet by the Pulmonologist .   ICU consulted for new CPAP  and COVID infection

## 2020-12-01 NOTE — H&P ADULT - NSICDXPASTSURGICALHX_GEN_ALL_CORE_FT
PAST SURGICAL HISTORY:  Gastric ulcer "four endoscopies" for diagnosis of gastric ulcer in February 2016    Hyperparathyroidism 2012 parathyroidectomy    S/P bronchoscopy 5/16    S/P bronchoscopy 2017    Tracheal stenosis 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    
no

## 2020-12-02 NOTE — CONSULT NOTE ADULT - SUBJECTIVE AND OBJECTIVE BOX
60y  Female  No Known Allergies    Patient is a 60y old  Female who presents with a chief complaint of COVID PNA requiring oxygen supplementation (02 Dec 2020 06:19)    HPI:  59 yo F from home lives with son (who is autistic, covid +) PMHx of hypothyroid, sleep apnea, PUD, cervical OA PSH tracheal resection and reconstruction, uterous myoma, HTN,  presents to the ED c/o shortness of breath x 2 days. Patient underwent sleep studyfrom 11/20 to 11/21 ,diagnosed with DARRELL, spiked fever the day after 101.6 associated with headache, and later on cough and diarrhea and tested positive for COVID on 111/23rd. pt has been having fever and diarrhea since Dx. Pt notes onset of  worsening shortness of breath  for past 2 days.  Patient denies vomiting, rash , joint pain or any other acute complaints. Pt started ABx likley amoxicillin (does not remember te name ) and Decadron 6mg X5 days.      Of note : pt reports h/o massive GIB resulted in emergency intubation for 5 days. Pt reports her trachea was damaged as a result of intubation. s/p tracheal resection and reconstruction April 2016. She has undergone multiple surveillance bronchoscopies,     ED course : patient saturated on high 80s on RA on arrival , was placed on NRB and descalated to NC 4 liters, pt saturated 96% on 4L NC,  on my evaluation patient was saturating 97% on 2 L NC however tachypneic while talking or getting exited.    CXR : patchy basilar infiltrates     (01 Dec 2020 13:01)    ICU reconsulted for worsening of respiratory status. Pt saturating 93% on 100% NRB. Pt  also started on REmdesivir today       GOC: rediscussed with pt and daughter Ms Sandra Smiley over phone. Pt expressed DNI. Also, after detailed discussion about CPR and intubation, pressed DNR wishes as well.   Pt is DNR/DNI.         PAST MEDICAL & SURGICAL HISTORY:  Lumbar back pain    Osteoporosis    History of tracheal stenosis    Other specified diseases of upper respiratory tract    Hypothyroidism    Kidney stones    Gallstones    Fatty liver    Thyroid disease    Obesity    Constipation    Gastric ulcer  February 2016- h/o GI bleed which patient had to be intubated Jan 2016    Arthritis of shoulder region, right    Arthritis  right shoulder    S/P bronchoscopy  2017    S/P bronchoscopy  5/16    Hyperparathyroidism  2012 parathyroidectomy    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    Gastric ulcer  &quot;four endoscopies&quot; for diagnosis of gastric ulcer in February 2016      FAMILY HISTORY:  Family history of MI (myocardial infarction) (Father, Sibling)      MEDICATIONS  (STANDING):  azithromycin  IVPB      azithromycin  IVPB 500 milliGRAM(s) IV Intermittent every 24 hours  cefTRIAXone   IVPB 1000 milliGRAM(s) IV Intermittent every 24 hours  cefTRIAXone   IVPB      dexAMETHasone  Injectable 6 milliGRAM(s) IV Push daily  enoxaparin Injectable 40 milliGRAM(s) SubCutaneous every 12 hours  levothyroxine 100 MICROGram(s) Oral daily  remdesivir  IVPB   IV Intermittent     MEDICATIONS  (PRN):  acetaminophen   Tablet .. 650 milliGRAM(s) Oral every 6 hours PRN Temp greater or equal to 38C (100.4F), Mild Pain (1 - 3)      Vital Signs Last 24 Hrs  T(C): 36.7 (02 Dec 2020 06:01), Max: 37.6 (01 Dec 2020 12:03)  T(F): 98.1 (02 Dec 2020 06:01), Max: 99.7 (01 Dec 2020 16:37)  HR: 80 (02 Dec 2020 08:49) (71 - 81)  BP: 146/77 (02 Dec 2020 06:01) (119/54 - 147/87)  BP(mean): --  RR: 20 (02 Dec 2020 08:49) (20 - 28)  SpO2: 92% (02 Dec 2020 08:49) (84% - 97%)  CBC Full  -  ( 02 Dec 2020 07:11 )  WBC Count : 12.54 K/uL  RBC Count : 4.25 M/uL  Hemoglobin : 13.3 g/dL  Hematocrit : 41.1 %  Platelet Count - Automated : 348 K/uL  Mean Cell Volume : 96.7 fl  Mean Cell Hemoglobin : 31.3 pg  Mean Cell Hemoglobin Concentration : 32.4 gm/dL  Auto Neutrophil # : x  Auto Lymphocyte # : x  Auto Monocyte # : x  Auto Eosinophil # : x  Auto Basophil # : x  Auto Neutrophil % : x  Auto Lymphocyte % : x  Auto Monocyte % : x  Auto Eosinophil % : x  Auto Basophil % : x    PT/INR - ( 01 Dec 2020 11:26 )   PT: 12.7 sec;   INR: 1.07 ratio         PTT - ( 01 Dec 2020 11:26 )  PTT:32.4 sec  12-02    137  |  104  |  10  ----------------------------<  128<H>  4.2   |  26  |  0.60    Ca    10.3      02 Dec 2020 07:11    TPro  7.9  /  Alb  2.6<L>  /  TBili  0.3  /  DBili  x   /  AST  37  /  ALT  40  /  AlkPhos  66  12-02      REVIEW OF SYSTEMS:  CONSTITUTIONAL: weakness   EYES: no acute visual disturbances  NECK: No pain or stiffness  RESPIRATORY: as above   CARDIOVASCULAR: No chest pain, no palpitations  GASTROINTESTINAL: No pain. No nausea or vomiting;  NEUROLOGICAL: No headache or numbness, no tremors  MUSCULOSKELETAL: No joint pain, no muscle pain  GENITOURINARY: no dysuria, no frequency, no hesitancy  PSYCHIATRY: no depression , no anxiety  ALL OTHER  ROS negative      PHYSICAL EXAM:  GENERAL: anxious appearing female, in respiratory distress   HEENT: Normocephalic;  conjunctivae and sclerae clear; moist mucous membranes;   NECK : supple  CHEST/LUNG: bilateral air entry present, bilateral  crackles present   HEART: S1 S2  regular; no murmurs, gallops or rubs  ABDOMEN: Soft, Nontender, Nondistended; Bowel sounds present  EXTREMITIES: no cyanosis; no edema; no calf tenderness  SKIN: warm and dry; no rash  NERVOUS SYSTEM:  Awake and alert; Oriented  to place, person and time ; no new deficits   60y  Female  No Known Allergies    Patient is a 60y old  Female who presents with a chief complaint of COVID PNA requiring oxygen supplementation (02 Dec 2020 06:19)    HPI:  61 yo F from home lives with son (who is autistic, covid +) PMHx of hypothyroid, sleep apnea, PUD, cervical OA PSH tracheal resection and reconstruction, uterous myoma, HTN  presents to the ED c/o shortness of breath x 2 days. Patient underwent sleep studyfrom 11/20 to 11/21 ,diagnosed with DARRELL, spiked fever the day after 101.6 associated with headache, and later on cough and diarrhea and tested positive for COVID on 111/23rd. pt has been having fever and diarrhea since Dx. Pt notes onset of  worsening shortness of breath  for past 2 days.  Patient denies vomiting, rash , joint pain or any other acute complaints. Pt started ABx likley amoxicillin (does not remember te name ) and Decadron 6mg X5 days.      Of note : pt reports h/o massive GIB resulted in emergency intubation for 5 days. Pt reports her trachea was damaged as a result of intubation. s/p tracheal resection and reconstruction April 2016. She has undergone multiple surveillance bronchoscopies,     ED course : patient saturated on high 80s on RA on arrival , was placed on NRB and descalated to NC 4 liters, pt saturated 96% on 4L NC,  on my evaluation patient was saturating 97% on 2 L NC however tachypneic while talking or getting exited.    CXR : patchy basilar infiltrates     (01 Dec 2020 13:01)    ICU reconsulted for worsening of respiratory status. Pt saturating 93% on 100% NRB. Pt  also started on REmdesivir today       GOC: rediscussed with pt and daughter Ms Sandra Smiley over phone. Pt expressed DNI. Also, after detailed discussion about CPR and intubation, pressed DNR wishes as well.   Pt is DNR/DNI.         PAST MEDICAL & SURGICAL HISTORY:  Lumbar back pain    Osteoporosis    History of tracheal stenosis    Other specified diseases of upper respiratory tract    Hypothyroidism    Kidney stones    Gallstones    Fatty liver    Thyroid disease    Obesity    Constipation    Gastric ulcer  February 2016- h/o GI bleed which patient had to be intubated Jan 2016    Arthritis of shoulder region, right    Arthritis  right shoulder    S/P bronchoscopy  2017    S/P bronchoscopy  5/16    Hyperparathyroidism  2012 parathyroidectomy    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    Gastric ulcer  &quot;four endoscopies&quot; for diagnosis of gastric ulcer in February 2016      FAMILY HISTORY:  Family history of MI (myocardial infarction) (Father, Sibling)      MEDICATIONS  (STANDING):  azithromycin  IVPB      azithromycin  IVPB 500 milliGRAM(s) IV Intermittent every 24 hours  cefTRIAXone   IVPB 1000 milliGRAM(s) IV Intermittent every 24 hours  cefTRIAXone   IVPB      dexAMETHasone  Injectable 6 milliGRAM(s) IV Push daily  enoxaparin Injectable 40 milliGRAM(s) SubCutaneous every 12 hours  levothyroxine 100 MICROGram(s) Oral daily  remdesivir  IVPB   IV Intermittent     MEDICATIONS  (PRN):  acetaminophen   Tablet .. 650 milliGRAM(s) Oral every 6 hours PRN Temp greater or equal to 38C (100.4F), Mild Pain (1 - 3)      Vital Signs Last 24 Hrs  T(C): 36.7 (02 Dec 2020 06:01), Max: 37.6 (01 Dec 2020 12:03)  T(F): 98.1 (02 Dec 2020 06:01), Max: 99.7 (01 Dec 2020 16:37)  HR: 80 (02 Dec 2020 08:49) (71 - 81)  BP: 146/77 (02 Dec 2020 06:01) (119/54 - 147/87)  BP(mean): --  RR: 20 (02 Dec 2020 08:49) (20 - 28)  SpO2: 92% (02 Dec 2020 08:49) (84% - 97%)  CBC Full  -  ( 02 Dec 2020 07:11 )  WBC Count : 12.54 K/uL  RBC Count : 4.25 M/uL  Hemoglobin : 13.3 g/dL  Hematocrit : 41.1 %  Platelet Count - Automated : 348 K/uL  Mean Cell Volume : 96.7 fl  Mean Cell Hemoglobin : 31.3 pg  Mean Cell Hemoglobin Concentration : 32.4 gm/dL  Auto Neutrophil # : x  Auto Lymphocyte # : x  Auto Monocyte # : x  Auto Eosinophil # : x  Auto Basophil # : x  Auto Neutrophil % : x  Auto Lymphocyte % : x  Auto Monocyte % : x  Auto Eosinophil % : x  Auto Basophil % : x    PT/INR - ( 01 Dec 2020 11:26 )   PT: 12.7 sec;   INR: 1.07 ratio         PTT - ( 01 Dec 2020 11:26 )  PTT:32.4 sec  12-02    137  |  104  |  10  ----------------------------<  128<H>  4.2   |  26  |  0.60    Ca    10.3      02 Dec 2020 07:11    TPro  7.9  /  Alb  2.6<L>  /  TBili  0.3  /  DBili  x   /  AST  37  /  ALT  40  /  AlkPhos  66  12-02      REVIEW OF SYSTEMS:  CONSTITUTIONAL: weakness   EYES: no acute visual disturbances  NECK: No pain or stiffness  RESPIRATORY: as above   CARDIOVASCULAR: No chest pain, no palpitations  GASTROINTESTINAL: No pain. No nausea or vomiting;  NEUROLOGICAL: No headache or numbness, no tremors  MUSCULOSKELETAL: No joint pain, no muscle pain  GENITOURINARY: no dysuria, no frequency, no hesitancy  PSYCHIATRY: no depression , no anxiety  ALL OTHER  ROS negative      PHYSICAL EXAM:  GENERAL: anxious appearing female, in respiratory distress   HEENT: Normocephalic;  conjunctivae and sclerae clear; moist mucous membranes;   NECK : supple  CHEST/LUNG: bilateral air entry present, bilateral  crackles present   HEART: S1 S2  regular; no murmurs, gallops or rubs  ABDOMEN: Soft, Nontender, Nondistended; Bowel sounds present  EXTREMITIES: no cyanosis; no edema; no calf tenderness  SKIN: warm and dry; no rash  NERVOUS SYSTEM:  Awake and alert; Oriented  to place, person and time ; no new deficits

## 2020-12-02 NOTE — CHART NOTE - NSCHARTNOTEFT_GEN_A_CORE
EVENT: ICU transfer     Vital Signs Last 24 Hrs  T(C): 36.7 (02 Dec 2020 06:01), Max: 37.6 (01 Dec 2020 12:03)  T(F): 98.1 (02 Dec 2020 06:01), Max: 99.7 (01 Dec 2020 16:37)  HR: 80 (02 Dec 2020 08:49) (71 - 81)  BP: 146/77 (02 Dec 2020 06:01) (119/54 - 147/87)  BP(mean): --  RR: 20 (02 Dec 2020 08:49) (20 - 28)  SpO2: 92% (02 Dec 2020 08:49) (84% - 97%)    LABS:                        13.3   12.54 )-----------( 348      ( 02 Dec 2020 07:11 )             41.1   CARDIAC MARKERS ( 02 Dec 2020 07:11 )  x     / x     / 31 U/L / x     / x        12-02    137  |  104  |  10  ----------------------------<  128<H>  4.2   |  26  |  0.60    Ca    10.3      02 Dec 2020 07:11    TPro  7.9  /  Alb  2.6<L>  /  TBili  0.3  /  DBili  x   /  AST  37  /  ALT  40  /  AlkPhos  66  12-02      EKG:     IMAGING: < from: Xray Chest 1 View-PORTABLE IMMEDIATE (12.01.20 @ 12:05) >      EXAM:  XR CHEST PORTABLE IMMED 1V                            PROCEDURE DATE:  12/01/2020          INTERPRETATION:  Chest one view    HISTORY: Shortness of breath, cough and fever    COMPARISON STUDY: 11/6/2018    Frontal expiratory view of the chest shows the heart to be similar in size. The lungs show patchy infiltrates of the lung bases with similar small left effusion and there is no evidence of pneumothorax nor pleural effusion.    IMPRESSION:  Patchy basilar infiltrates. Differential diagnosis includes Covid-19 pneumonia among other possibilities.    < end of copied text >        Physcial Exam:  CONSTITUTIONAL: in mild respiratory distress while talking  ENMT: Airway patent, Nasal mucosa clear. Mouth with normal mucosa. Throat has no vesicles, no oropharyngeal exudates and uvula is midline.  EYES: Clear bilaterally, pupils equal, round and reactive to light. EOMI.  CARDIAC: Normal rate, regular rhythm.  Heart sounds S1, S2.  No murmurs, rubs or gallops   RESPIRATORY: dimished throughtout lung field, and slight crackles throughout, tachypnea  MUSCULOSKELETAL: Spine appears normal, range of motion is not limited, no muscle or joint tenderness  EXTREMITIES: No edema, cyanosis or deformity   NEUROLOGICAL: Alert and oriented x 3, no focal deficits, no motor or sensory deficits.  SKIN: No rash, skin turgor    ABD : bilateral suprapubic abdominal mass (uterus fibroma), NT , ND    Assessment: Patient is a 60 year old female from home, lives with son (who is autistic, COVID+) PMHx of hypothyroid, sleep apnea, PUD, cervical OA PSH tracheal resection and reconstruction, uterous myoma, HTN,  presents to the ED c/o shortness of breath x 2 days. Patient underwent sleep study from 11/20 to 11/21 ,diagnosed with DARRELL, spiked fever the day after 101.6 associated with headache, and later on cough and diarrhea and tested positive for COVID on 11/23rd. pt has been having fever and diarrhea since Dx. Pt notes onset of  worsening shortness of breath  for past 2 days.      Patient COVID antibody negative and saturation 90% 100% NRB, started on decadron and remdesivir. Patient was evaluated for ICU, but was not a candidate due improvement from initial oxygen therapy 95%4LNC. However overnight patient was found to have worsening Saturation 88%NRB. Patient was re-evaluated for ICU.    Of note : pt reports h/o massive GIB resulted in emergency intubation for 5 days. Pt reports her trachea was damaged as a result of intubation. s/p tracheal resection and reconstruction April 2016. She has undergone multiple surveillance bronchoscopies, Patient wants to be DNI given her background, however requested for cardiac resuscitation.         PLAN: Acute hypoxic respiratory distress secondary to COVID PNA  - Sat 90% on 100 NRB  -c/w decadron and remdesivir   -f/u biomarker and LFT  -c/w azithromycin and ceftriaxone for chest -ray basiliary infiltrate  -ICU transfer

## 2020-12-02 NOTE — PROGRESS NOTE ADULT - ASSESSMENT
COVID -19 infection /pneumonia  Hypoxia  Fevers - improved  Leukocytosis - mild    Plan - Cont Rocephin 1 gm iv q24hrs  Cont Azithromycin 500mgs iv q24 hrs  Cont Decadron 6mgs iv q24hrs  Started on Remdesivir 200mgs iv x 1 today then 100mgs iv q24 hrs x 4 days.  time spent - 32 mins.

## 2020-12-02 NOTE — CONSULT NOTE ADULT - ASSESSMENT
1. PNA 2nd to Covid-19  - PCR positive. IGG Negative.   - CXR noted.  - Continue abx  - Continue Vit C, Vit D, Zinc   - Continue Dexamethasone   - Continue Remdesivir   - Continue Singulair and Pepcid   - Robitussin PRN for cough  - Tylenol PRN for temp   - O2 Supp - NRM - ICU to eval as pt may need High-Flow.   - Monitor O2 Sat.  - Monitor labs  - F.U CXR  - Prone positioning as tolerated   - DVT and GI PPX     2. DARRELL   - Newly diagnosed.   - Currently being treated for Covid-19.     3. Gastric Ulcer  - Stool Studies  - Monitor labs  - Continue meds  - GI Eval.     4. Hx of Intubation   - S/P intubation x 5 days in 2016 for GIB  - Trachea was damaged during intubation.  - S/P tracheal resection and reconstruction.   - S/P surveillance bronchoscopies   - Request resuscitation measures if needed but wants to be DNI

## 2020-12-02 NOTE — CONSULT NOTE ADULT - SUBJECTIVE AND OBJECTIVE BOX
PULMONARY CONSULT NOTE      NADJA HU  MRN-[id number]    Patient is a 60y old  Female who presents with a chief complaint of COVID PNA requiring oxygen supplementation (02 Dec 2020 06:19)      HISTORY OF PRESENT ILLNESS:  History of Present Illness:  Reason for Admission: COVID PNA requiring oxygen supplementation  History of Present Illness:   59 yo F from home lives with son (who is autistic, covid +) PMHx of hypothyroid, sleep apnea, PUD, cervical OA PSH tracheal resection and reconstruction, uterous myoma, HTN,  presents to the ED c/o shortness of breath x 2 days. Patient underwent sleep study from 11/20 to 11/21 ,diagnosed with DARRELL, spiked fever the day after 101.6 associated with headache, and later on cough and diarrhea and tested positive for COVID on 111/23rd. pt has been having fever and diarrhea since Dx. Pt notes onset of  worsening shortness of breath  for past 2 days.  Patient denies vomiting, rash , joint pain or any other acute complaints. Pt started ABx likley amoxicillin (does not remember te name ) and Decadron 6mg X5 days.      Of note : pt reports h/o massive GIB resulted in emergency intubation for 5 days. Pt reports her trachea was damaged as a result of intubation. s/p tracheal resection and reconstruction April 2016. She has undergone multiple surveillance bronchoscopies, Patient wants to be DNI given her background, however requested for cardiac resuscitation.      ED course : patient saturated on high 80s on RA on arrival , was placed on NRB and descalated to NC 4 liters, pt saturated 96% on 4L NC,  on my evaluation patient was saturating 97% on 2 L NC however tachypneic while talking or getting exited.   CXR : patchy basilar infiltrates     Pt is awake, alert, lying in bed on NRM. Saturating 93%.     MEDICATIONS  (STANDING):  azithromycin  IVPB      azithromycin  IVPB 500 milliGRAM(s) IV Intermittent every 24 hours  cefTRIAXone   IVPB 1000 milliGRAM(s) IV Intermittent every 24 hours  cefTRIAXone   IVPB      dexAMETHasone  Injectable 6 milliGRAM(s) IV Push daily  enoxaparin Injectable 40 milliGRAM(s) SubCutaneous every 12 hours  levothyroxine 100 MICROGram(s) Oral daily  remdesivir  IVPB   IV Intermittent     MEDICATIONS  (PRN):  acetaminophen   Tablet .. 650 milliGRAM(s) Oral every 6 hours PRN Temp greater or equal to 38C (100.4F), Mild Pain (1 - 3)    Allergies    No Known Allergies    Intolerances    PAST MEDICAL & SURGICAL HISTORY:  Lumbar back pain    Osteoporosis    History of tracheal stenosis    Other specified diseases of upper respiratory tract    Hypothyroidism    Kidney stones    Gallstones    Fatty liver    Thyroid disease    Obesity    Constipation    Gastric ulcer  February 2016- h/o GI bleed which patient had to be intubated Jan 2016    Arthritis of shoulder region, right    Arthritis  right shoulder    S/P bronchoscopy  2017    S/P bronchoscopy  5/16    Hyperparathyroidism  2012 parathyroidectomy    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    Gastric ulcer  &quot;four endoscopies&quot; for diagnosis of gastric ulcer in February 2016        FAMILY HISTORY:  Family history of MI (myocardial infarction) (Father, Sibling)        SOCIAL HISTORY  Smoking History:     REVIEW OF SYSTEMS:    CONSTITUTIONAL:  No fevers, chills, sweats    HEENT:  Eyes:  No diplopia or blurred vision. ENT:  No earache, sore throat or runny nose.    CARDIOVASCULAR:  No pressure, squeezing, tightness, or heaviness about the chest; no palpitations.    RESPIRATORY:  Per HPI    GASTROINTESTINAL:  No abdominal pain, nausea, vomiting or diarrhea.    GENITOURINARY:  No dysuria, frequency or urgency.    NEUROLOGIC:  No paresthesias, fasciculations, seizures or weakness.    PSYCHIATRIC:  No disorder of thought or mood.    Vital Signs Last 24 Hrs  T(C): 36.7 (02 Dec 2020 06:01), Max: 37.6 (01 Dec 2020 12:03)  T(F): 98.1 (02 Dec 2020 06:01), Max: 99.7 (01 Dec 2020 16:37)  HR: 80 (02 Dec 2020 08:49) (71 - 81)  BP: 146/77 (02 Dec 2020 06:01) (119/54 - 147/87)  BP(mean): --  RR: 20 (02 Dec 2020 08:49) (20 - 28)  SpO2: 92% (02 Dec 2020 08:49) (84% - 97%)  I&O's Detail      PHYSICAL EXAMINATION:    GENERAL: The patient is a well-developed, well-nourished no apparent distress.     HEENT: Head is normocephalic and atraumatic. Extraocular muscles are intact. Mucous membranes are moist.     NECK: Supple.     LUNGS: Clear to auscultation without wheezing, rales, or rhonchi. Respirations unlabored    HEART: Regular rate and rhythm without murmur.    ABDOMEN: Soft, nontender, and nondistended.  No hepatosplenomegaly is noted.    EXTREMITIES: Without any cyanosis, clubbing, rash, lesions or edema.    NEUROLOGIC: Grossly intact.      LABS:                        13.3   12.54 )-----------( 348      ( 02 Dec 2020 07:11 )             41.1     12-02    137  |  104  |  10  ----------------------------<  128<H>  4.2   |  26  |  0.60    Ca    10.3      02 Dec 2020 07:11    TPro  7.9  /  Alb  2.6<L>  /  TBili  0.3  /  DBili  x   /  AST  37  /  ALT  40  /  AlkPhos  66  12-02    PT/INR - ( 01 Dec 2020 11:26 )   PT: 12.7 sec;   INR: 1.07 ratio         PTT - ( 01 Dec 2020 11:26 )  PTT:32.4 sec      CARDIAC MARKERS ( 02 Dec 2020 07:11 )  x     / x     / 31 U/L / x     / x        D-Dimer Assay, Quantitative: <150 ng/mL DDU (12-01-20 @ 11:26)    Procalcitonin, Serum: 0.10 ng/mL (12-01-20 @ 17:56)    MICROBIOLOGY:  COVID-19 Antibody - for prior infection screening (12.01.20 @ 22:54)   COVID-19 IgG Antibody Index: <0.10: [employer] CMIA   Negative Result <= 1.39 Index   Positive Result >= 1.40 Index Index   COVID-19 PCR . (12.01.20 @ 11:19)   COVID-19 PCR: Detected: TEST PERFORMED ON [employer] INSTRUMENT. You can help in the fight against   RADIOLOGY & ADDITIONAL STUDIES:    CXR:  < from: Xray Chest 1 View-PORTABLE IMMEDIATE (12.01.20 @ 12:05) >  IMPRESSION:  Patchy basilar infiltrates. Differential diagnosis includes Covid-19 pneumonia among other possibilities.    Thank you for the courtesy of this referral.    < end of copied text >    Ct scan chest:    ekg;    echo:

## 2020-12-02 NOTE — CHART NOTE - NSCHARTNOTEFT_GEN_A_CORE
EVENT: Received telephone call from RN that pt is c/o of insomnia.    HPI: 61 yo F from home lives with son (autistic, covid +) PMHx of hypothyroid, sleep apnea, PUD, cervical OA PSH tracheal resection and reconstruction, uterous myoma, HTN,  presents to the ED c/o shortness of breath x 2 days. Patient underwent sleep study last Friday ,diagnosed with DARRELL, spiked fever the day after 10.6 (1 week ago) associated with headache, and later on cough and diarrhea and tested positive for COVID at that time. Admitted for COVID pNA requiring O2 supplantation.      Subjective: "I can't sleep. I need an ambien"    OBJECTIVE:  Vital Signs Last 24 Hrs  T(C): 36.6 (01 Dec 2020 21:59), Max: 37.8 (01 Dec 2020 09:53)  T(F): 97.8 (01 Dec 2020 21:59), Max: 100 (01 Dec 2020 09:53)  HR: 71 (01 Dec 2020 21:59) (71 - 91)  BP: 119/54 (01 Dec 2020 21:59) (119/54 - 161/83)  BP(mean): --  RR: 24 (01 Dec 2020 21:59) (20 - 24)  SpO2: 96% (01 Dec 2020 21:59) (94% - 98%)    FOCUSED PHYSICAL EXAM:  Neuro: awake, alert, oriented x 3. No neuro deficit  Cardiovascular: Pulses +2 B/L in lower and upper extremities, HR regular, BP stable, No edema.  Respiratory: Respirations regular, unlabored, breath sounds clear B/L.   GI: Abdomen soft, non-tender, positive bowel sounds.  : no bladder distention noted. No complaints at this time.  Skin: Dry, intact, no bruising, no diaphoresis.    LABS:                        13.4   11.17 )-----------( 321      ( 01 Dec 2020 11:26 )             41.7     12-01    137  |  102  |  15  ----------------------------<  141<H>  3.9   |  29  |  0.71    Ca    10.8<H>      01 Dec 2020 11:26    TPro  8.0  /  Alb  2.9<L>  /  TBili  0.3  /  DBili  x   /  AST  24  /  ALT  30  /  AlkPhos  57  12-01      EKG:   IMAGING:      ASSESSMENT/Problem: Insomnia probably 2/2 to hospitalization      PLAN:   1. Ambien 5mg, PO x 1 dose ordered  2. Cont present care/treatment  3. Supportive care

## 2020-12-02 NOTE — PROGRESS NOTE ADULT - SUBJECTIVE AND OBJECTIVE BOX
60y Female    Meds:  azithromycin  IVPB      azithromycin  IVPB 500 milliGRAM(s) IV Intermittent every 24 hours  cefTRIAXone   IVPB 1000 milliGRAM(s) IV Intermittent every 24 hours  cefTRIAXone   IVPB      remdesivir  IVPB   IV Intermittent     Allergies    No Known Allergies    Intolerances        VITALS:  Vital Signs Last 24 Hrs  T(C): 36.5 (02 Dec 2020 11:15), Max: 37.6 (01 Dec 2020 16:37)  T(F): 97.7 (02 Dec 2020 11:15), Max: 99.7 (01 Dec 2020 16:37)  HR: 79 (02 Dec 2020 14:00) (71 - 81)  BP: 149/84 (02 Dec 2020 14:00) (119/54 - 164/88)  BP(mean): 100 (02 Dec 2020 14:00) (93 - 108)  RR: 39 (02 Dec 2020 14:50) (20 - 50)  SpO2: 95% (02 Dec 2020 14:50) (84% - 97%)    LABS/DIAGNOSTIC TESTS:                          13.3   12.54 )-----------( 348      ( 02 Dec 2020 07:11 )             41.1         12-02    x   |  x   |  x   ----------------------------<  x   x    |  x   |  0.64    Ca    10.3      02 Dec 2020 07:11    TPro  7.8  /  Alb  2.4<L>  /  TBili  0.3  /  DBili  <0.1  /  AST  41<H>  /  ALT  44  /  AlkPhos  64  12-02      LIVER FUNCTIONS - ( 02 Dec 2020 12:41 )  Alb: 2.4 g/dL / Pro: 7.8 g/dL / ALK PHOS: 64 U/L / ALT: 44 U/L DA / AST: 41 U/L / GGT: x             CULTURES:       RADIOLOGY:< from: Xray Chest 1 View- PORTABLE-Urgent (Xray Chest 1 View- PORTABLE-Urgent .) (12.02.20 @ 11:47) >  EXAM:  XR CHEST PORTABLE URGENT 1V                            PROCEDURE DATE:  12/02/2020          INTERPRETATION:  INDICATION: Shortness of breath.    PRIORS: 4/9/2016.    VIEWS: Portable AP radiography of the chest performed.    FINDINGS: Heart size appears within normal limits. No superior mediastinal widening is identified. Bilateral lung parenchymal airspace opacities suggesting bilateral pneumonia. No pleural effusion. No pneumothorax. No mediastinal shift. No osseous fractures.    IMPRESSION: Bilateral lung parenchymal airspace opacities suggests bilateral pneumonia.              MACHELLE VALIENTE MD; Attending Radiologist  This document has been electronically signed. Dec  2 2020 12:49PM    < end of copied text >        ROS:  [  ] UNABLE TO ELICIT ICU VISIT  60y Female who was moved to the ICU today as her oxygen saturations were dropping down to 80% on a nasal cannula and so now is on high flow oxygen in the ICU, she came back negative for COVID antibodies and so started her on Remdesivir this morning. She is c/o feeling very anxious just now despite all her vitals being stable.  She has been c/o SOB, a dry cough and chest pain with coughing,  She has low grade fever only and a marginally elevated WBC count.         Meds:  azithromycin  IVPB      azithromycin  IVPB 500 milliGRAM(s) IV Intermittent every 24 hours  cefTRIAXone   IVPB 1000 milliGRAM(s) IV Intermittent every 24 hours  cefTRIAXone   IVPB      remdesivir  IVPB   IV Intermittent     Allergies    No Known Allergies    Intolerances        VITALS:  Vital Signs Last 24 Hrs  T(C): 36.5 (02 Dec 2020 11:15), Max: 37.6 (01 Dec 2020 16:37)  T(F): 97.7 (02 Dec 2020 11:15), Max: 99.7 (01 Dec 2020 16:37)  HR: 79 (02 Dec 2020 14:00) (71 - 81)  BP: 149/84 (02 Dec 2020 14:00) (119/54 - 164/88)  BP(mean): 100 (02 Dec 2020 14:00) (93 - 108)  RR: 39 (02 Dec 2020 14:50) (20 - 50)  SpO2: 95% (02 Dec 2020 14:50) (84% - 97%)    LABS/DIAGNOSTIC TESTS:                          13.3   12.54 )-----------( 348      ( 02 Dec 2020 07:11 )             41.1         12-02    x   |  x   |  x   ----------------------------<  x   x    |  x   |  0.64    Ca    10.3      02 Dec 2020 07:11    TPro  7.8  /  Alb  2.4<L>  /  TBili  0.3  /  DBili  <0.1  /  AST  41<H>  /  ALT  44  /  AlkPhos  64  12-02      LIVER FUNCTIONS - ( 02 Dec 2020 12:41 )  Alb: 2.4 g/dL / Pro: 7.8 g/dL / ALK PHOS: 64 U/L / ALT: 44 U/L DA / AST: 41 U/L / GGT: x             CULTURES:       RADIOLOGY:< from: Xray Chest 1 View- PORTABLE-Urgent (Xray Chest 1 View- PORTABLE-Urgent .) (12.02.20 @ 11:47) >  EXAM:  XR CHEST PORTABLE URGENT 1V                            PROCEDURE DATE:  12/02/2020          INTERPRETATION:  INDICATION: Shortness of breath.    PRIORS: 4/9/2016.    VIEWS: Portable AP radiography of the chest performed.    FINDINGS: Heart size appears within normal limits. No superior mediastinal widening is identified. Bilateral lung parenchymal airspace opacities suggesting bilateral pneumonia. No pleural effusion. No pneumothorax. No mediastinal shift. No osseous fractures.    IMPRESSION: Bilateral lung parenchymal airspace opacities suggests bilateral pneumonia.              MACHELLE VALIENTE MD; Attending Radiologist  This document has been electronically signed. Dec  2 2020 12:49PM    < end of copied text >        ROS:  [  ] UNABLE TO ELICIT

## 2020-12-02 NOTE — PROGRESS NOTE ADULT - SUBJECTIVE AND OBJECTIVE BOX
Patient is a 60y old  Female who presents with a chief complaint of COVID PNA requiring oxygen supplementation (01 Dec 2020 20:32)    pt seen in icu [  ], reg med floor [   ], bed [  ], chair at bedside [   ], a+o x3 [  ], lethargic [  ],  nad [  ]    valencia [  ], ngt [  ], peg [  ], et tube [  ], cent line [  ], picc line [  ]        Allergies    No Known Allergies        Vitals    T(F): 98.1 (12-02-20 @ 06:01), Max: 100 (12-01-20 @ 09:53)  HR: 73 (12-02-20 @ 06:01) (71 - 91)  BP: 146/77 (12-02-20 @ 06:01) (119/54 - 161/83)  RR: 24 (12-02-20 @ 06:01) (20 - 26)  SpO2: 93% (12-02-20 @ 06:01) (92% - 98%)  Wt(kg): --  CAPILLARY BLOOD GLUCOSE      POCT Blood Glucose.: 137 mg/dL (01 Dec 2020 09:57)      Labs                          13.4   11.17 )-----------( 321      ( 01 Dec 2020 11:26 )             41.7       12-01    137  |  102  |  15  ----------------------------<  141<H>  3.9   |  29  |  0.71    Ca    10.8<H>      01 Dec 2020 11:26    TPro  8.0  /  Alb  2.9<L>  /  TBili  0.3  /  DBili  x   /  AST  24  /  ALT  30  /  AlkPhos  57  12-01                Radiology Results      Meds    MEDICATIONS  (STANDING):  azithromycin  IVPB      azithromycin  IVPB 500 milliGRAM(s) IV Intermittent every 24 hours  cefTRIAXone   IVPB      cefTRIAXone   IVPB 1000 milliGRAM(s) IV Intermittent every 24 hours  dexAMETHasone  Injectable 6 milliGRAM(s) IV Push daily  enoxaparin Injectable 40 milliGRAM(s) SubCutaneous every 12 hours  levothyroxine 100 MICROGram(s) Oral daily      MEDICATIONS  (PRN):  acetaminophen   Tablet .. 650 milliGRAM(s) Oral every 6 hours PRN Temp greater or equal to 38C (100.4F), Mild Pain (1 - 3)      Physical Exam    Neuro :  no focal deficits  Respiratory: CTA B/L  CV: RRR, S1S2, no murmurs,   Abdominal: Soft, NT, ND +BS,  Extremities: No edema, + peripheral pulses    ASSESSMENT    Infection due to severe acute respiratory syndrome coronavirus 2 (SARS-CoV-2)    Lumbar back pain    Osteoporosis    History of tracheal stenosis    Other specified diseases of upper respiratory tract    Hypothyroidism    Kidney stones    Gallstones    Fatty liver    Thyroid disease    Obesity    Constipation    Gastric ulcer    Arthritis of shoulder region, right    Arthritis    S/P bronchoscopy    S/P bronchoscopy    Hyperparathyroidism    Tracheal stenosis    Gastric ulcer    No significant past surgical history    No significant past surgical history        PLAN     Patient is a 60y old  Female who presents with a chief complaint of COVID PNA requiring oxygen supplementation (01 Dec 2020 20:32)    pt seen in icu [  ], reg med floor [ x  ], bed [ x ], chair at bedside [   ], a+o x3 [ x ], lethargic [  ],  nad [x  ]      Allergies    No Known Allergies        Vitals    T(F): 98.1 (12-02-20 @ 06:01), Max: 100 (12-01-20 @ 09:53)  HR: 73 (12-02-20 @ 06:01) (71 - 91)  BP: 146/77 (12-02-20 @ 06:01) (119/54 - 161/83)  RR: 24 (12-02-20 @ 06:01) (20 - 26)  SpO2: 93% (12-02-20 @ 06:01) (92% - 98%)  Wt(kg): --  CAPILLARY BLOOD GLUCOSE      POCT Blood Glucose.: 137 mg/dL (01 Dec 2020 09:57)      Labs                          13.4   11.17 )-----------( 321      ( 01 Dec 2020 11:26 )             41.7       12-01    137  |  102  |  15  ----------------------------<  141<H>  3.9   |  29  |  0.71    Ca    10.8<H>      01 Dec 2020 11:26    TPro  8.0  /  Alb  2.9<L>  /  TBili  0.3  /  DBili  x   /  AST  24  /  ALT  30  /  AlkPhos  57  12-01                Radiology Results      Meds    MEDICATIONS  (STANDING):  azithromycin  IVPB      azithromycin  IVPB 500 milliGRAM(s) IV Intermittent every 24 hours  cefTRIAXone   IVPB      cefTRIAXone   IVPB 1000 milliGRAM(s) IV Intermittent every 24 hours  dexAMETHasone  Injectable 6 milliGRAM(s) IV Push daily  enoxaparin Injectable 40 milliGRAM(s) SubCutaneous every 12 hours  levothyroxine 100 MICROGram(s) Oral daily      MEDICATIONS  (PRN):  acetaminophen   Tablet .. 650 milliGRAM(s) Oral every 6 hours PRN Temp greater or equal to 38C (100.4F), Mild Pain (1 - 3)      Physical Exam    Neuro :  no focal deficits  Respiratory: CTA B/L  CV: RRR, S1S2, no murmurs,   Abdominal: Soft, NT, ND +BS,  Extremities: No edema, + peripheral pulses          ASSESSMENT    Infection due to severe acute respiratory syndrome coronavirus 2 (SARS-CoV-2)  pneumonia,   hypoxia,   h/o hypothyroid,   sleep apnea,   PUD,   cervical OA   tracheal resection and reconstruction,   uterus myoma,   HTN,   gi bleed  Osteoporosis  Hyperparathyroidism      PLAN    rocephin, zithromax,   id cons  pt to start remdesivir  cont vit c, vitamin d, zinc, pepcid, singulair, decadron,   cont contact and airborne isolation, cont albuterol inhaler,   cont tylenol prn, robitussin prn   pulm cons   cont O2 via nasal canula as needed,   dvt prophylaxis  covid-19 antibody test neg,   resp viral panel positive for covid 19,   f/u procalcitonin, D-dimer, esr, crp, ldh, ferritin, lactate,   f/u bld cx, ua, ucx,  icu cons for high flako given pt's increase O2 need  gi cons.   f/u stool studies   cont current meds

## 2020-12-02 NOTE — CONSULT NOTE ADULT - ASSESSMENT
ASSESSMENT AND PLAN:      =================== Neuro============================  Alert and oriented x 3        ================= Cardiovascular==========================  HTN:     ================- Pulm=================================  Acute hypooxic respiratory failure due to COVID PNA with concern for superficial bacterial infection       sleep Apnea     ==================ID===================================  PNA: management as above     ================= Nephro================================  no active issues     =================GI====================================  PUD:     ================ Heme==================================  No issues.    =================Endocrine===============================  hypothyroidism     ================= Skin/Catheters============================  No rashes. Peripheral IV lines.     - =================Prophylaxis =============================  LOVenox DVT proph for COVID: 40 mg BID sub cut   Protonix for  GI proph    ==================GOC==================================  DNR/DNI, discussed with pt and daughter over phone and MOLST form signed and placed in the chart.   ==================Disposition===============================  Pt accpeted to ICU for further care   ASSESSMENT AND PLAN:  61 yo F from home lives with son (who is autistic, covid +) PMHx of hypothyroid, sleep apnea, HTN PUD, cervical OA PSH tracheal resection and reconstruction, uterous myoma, presents to the ED c/o shortness of breath x 2 days. Patient underwent sleep studyfrom 11/20 to 11/21 ,diagnosed with DARRELL, spiked fever the day after 101.6 associated with headache, and later on cough and diarrhea and tested positive for COVID on 111/23rd. pt has been having fever and diarrhea since Dx. Pt notes onset of  worsening shortness of breath  for past 2 days.  Patient denies vomiting, rash , joint pain or any other acute complaints. Pt started ABx likley amoxicillin (does not remember te name ) and Decadron 6mg X5 days.    Of note : pt reports h/o massive GIB resulted in emergency intubation for 5 days. Pt reports her trachea was damaged as a result of intubation. s/p tracheal resection and reconstruction April 2016. She has undergone multiple surveillance bronchoscopies,     ED course : patient saturated on high 80s on RA on arrival , was placed on NRB and descalated to NC 4 liters, pt saturated 96% on 4L NC,  on my evaluation patient was saturating 97% on 2 L NC however tachypneic while talking or getting exited.  CXR : patchy basilar infiltrates   (01 Dec 2020 13:01)    ICU reconsulted for worsening of respiratory status. Pt saturating 93% on 100% NRB. Pt  also started on REmdesivir today.     Assessment: Acute hypoxic respiratory failure due to COVID PNA, with suspected super-imposed bacterial PNA, Sleep Apnea, PUD, hypothyroidism       =================== Neuro============================  Alert and oriented x 3    ================= Cardiovascular==========================  HTN: not on any medication at home   - continue to monitor for now.       ================- Pulm=================================  Acute hypooxic respiratory failure due to COVID PNA with concern for superficial bacterial infection :  c/w Decadron, Remedesivir,   - c/w Rocephin and Azith   - Presently on NRB saturating 93% on rest  - ABG ordered: follow results, plan to start  hi flow   - bed rest   - Dr Nelson follow up   -LOVenox DVT proph for COVID: 40 mg BID sub cut   - Follow inflammatory markers.       sleep Apnea : pt recently had Sleep study done and diagnosed with Sleep apnea on NOv 20 but not started on CPAP yet   OP follow up       ==================ID===================================  PNA: management as above     ================= Nephro================================  no active issues     =================GI====================================  PUD: Protonix     ================ Heme==================================  No issues.    =================Endocrine===============================  hypothyroidism ; c/w Levothyroxine     ================= Skin/Catheters============================  No rashes. Peripheral IV lines.     - =================Prophylaxis =============================  LOVenox DVT proph for COVID: 40 mg BID sub cut   Protonix for  GI proph    ==================GOC==================================  DNR/DNI, discussed with pt and daughter over phone and MOLST form signed and placed in the chart.     ==================Disposition===============================  Pt accpeted to ICU for further care

## 2020-12-03 NOTE — PROGRESS NOTE ADULT - SUBJECTIVE AND OBJECTIVE BOX
Patient is a 60y old  Female who presents with a chief complaint of COVID PNA requiring oxygen supplementation (02 Dec 2020 15:05)    pt seen in icu [  ], reg med floor [ x  ], bed [ x ], chair at bedside [   ], a+o x3 [ x ], lethargic [  ],    nad [x  ]      Allergies    No Known Allergies        Vitals    T(F): 99.2 (12-03-20 @ 05:00), Max: 99.2 (12-03-20 @ 05:00)  HR: 67 (12-03-20 @ 06:00) (62 - 86)  BP: 131/81 (12-03-20 @ 06:00) (102/69 - 164/88)  RR: 35 (12-03-20 @ 06:00) (19 - 50)  SpO2: 88% (12-03-20 @ 06:00) (87% - 96%)  Wt(kg): --  CAPILLARY BLOOD GLUCOSE      POCT Blood Glucose.: 137 mg/dL (01 Dec 2020 09:57)      Labs                          13.2   12.34 )-----------( 378      ( 03 Dec 2020 06:26 )             41.6       12-02    x   |  x   |  x   ----------------------------<  x   x    |  x   |  0.64    Ca    10.3      02 Dec 2020 07:11    TPro  7.8  /  Alb  2.4<L>  /  TBili  0.3  /  DBili  <0.1  /  AST  41<H>  /  ALT  44  /  AlkPhos  64  12-02      CARDIAC MARKERS ( 02 Dec 2020 07:11 )  x     / x     / 31 U/L / x     / x                Radiology Results      Meds    MEDICATIONS  (STANDING):  ALPRAZolam 2 milliGRAM(s) Oral at bedtime  ascorbic acid 1000 milliGRAM(s) Oral daily  azithromycin  IVPB      azithromycin  IVPB 500 milliGRAM(s) IV Intermittent every 24 hours  cefTRIAXone   IVPB 1000 milliGRAM(s) IV Intermittent every 24 hours  cefTRIAXone   IVPB      cholecalciferol 2000 Unit(s) Oral daily  dexAMETHasone  Injectable 6 milliGRAM(s) IV Push daily  enoxaparin Injectable 40 milliGRAM(s) SubCutaneous every 12 hours  famotidine    Tablet 40 milliGRAM(s) Oral two times a day  levothyroxine 100 MICROGram(s) Oral daily  montelukast 10 milliGRAM(s) Oral at bedtime  remdesivir  IVPB   IV Intermittent   remdesivir  IVPB 100 milliGRAM(s) IV Intermittent every 24 hours  zinc sulfate 220 milliGRAM(s) Oral daily      MEDICATIONS  (PRN):  acetaminophen   Tablet .. 650 milliGRAM(s) Oral every 6 hours PRN Temp greater or equal to 38C (100.4F), Mild Pain (1 - 3)      Physical Exam    Neuro :  no focal deficits  Respiratory: CTA B/L  CV: RRR, S1S2, no murmurs,   Abdominal: Soft, NT, ND +BS,  Extremities: No edema, + peripheral pulses          ASSESSMENT    Infection due to severe acute respiratory syndrome coronavirus 2 (SARS-CoV-2)  pneumonia,   hypoxia,   h/o hypothyroid,   sleep apnea,   PUD,   cervical OA   tracheal resection and reconstruction,   uterus myoma,   HTN,   gi bleed  Osteoporosis  Hyperparathyroidism      PLAN    rocephin, zithromax,   id cons  pt to start remdesivir  cont vit c, vitamin d, zinc, pepcid, singulair, decadron,   cont contact and airborne isolation, cont albuterol inhaler,   cont tylenol prn, robitussin prn   pulm cons   cont O2 via nasal canula as needed,   dvt prophylaxis  covid-19 antibody test neg,   resp viral panel positive for covid 19,   f/u procalcitonin, D-dimer, esr, crp, ldh, ferritin, lactate,   f/u bld cx, ua, ucx,  icu cons for high flako given pt's increase O2 need  gi cons.   f/u stool studies   cont current meds         Patient is a 60y old  Female who presents with a chief complaint of COVID PNA requiring oxygen supplementation (02 Dec 2020 15:05)    pt seen in icu [  ], reg med floor [ x  ], bed [ x ], chair at bedside [   ], a+o x3 [ x ], lethargic [  ],    nad [x  ]      Allergies    No Known Allergies        Vitals    T(F): 99.2 (12-03-20 @ 05:00), Max: 99.2 (12-03-20 @ 05:00)  HR: 67 (12-03-20 @ 06:00) (62 - 86)  BP: 131/81 (12-03-20 @ 06:00) (102/69 - 164/88)  RR: 35 (12-03-20 @ 06:00) (19 - 50)  SpO2: 88% (12-03-20 @ 06:00) (87% - 96%)  Wt(kg): --  CAPILLARY BLOOD GLUCOSE      POCT Blood Glucose.: 137 mg/dL (01 Dec 2020 09:57)      Labs                          13.2   12.34 )-----------( 378      ( 03 Dec 2020 06:26 )             41.6       12-02    x   |  x   |  x   ----------------------------<  x   x    |  x   |  0.64    Ca    10.3      02 Dec 2020 07:11    TPro  7.8  /  Alb  2.4<L>  /  TBili  0.3  /  DBili  <0.1  /  AST  41<H>  /  ALT  44  /  AlkPhos  64  12-02      CARDIAC MARKERS ( 02 Dec 2020 07:11 )  x     / x     / 31 U/L / x     / x            Procalcitonin, Serum (12.02.20 @ 09:35)   Procalcitonin, Serum: 0.07: Test Repeated     Sedimentation Rate, Erythrocyte (12.02.20 @ 07:11)   Sedimentation Rate, Erythrocyte: 83 mm/Hr     C-Reactive Protein, Serum (12.02.20 @ 09:35)   C-Reactive Protein, Serum: 15.78 mg/dL     Ferritin, Serum in AM (12.02.20 @ 09:35)   Ferritin, Serum: 699 ng/mL     D-Dimer Assay, Quantitative (12.01.20 @ 11:26)   D-Dimer Assay, Quantitative: <150 ng/mL DDU     Lactate Dehydrogenase, Serum in AM (12.02.20 @ 07:11)   Lactate Dehydrogenase, Serum: 290 U/L       Radiology Results      Meds    MEDICATIONS  (STANDING):  ALPRAZolam 2 milliGRAM(s) Oral at bedtime  ascorbic acid 1000 milliGRAM(s) Oral daily  azithromycin  IVPB      azithromycin  IVPB 500 milliGRAM(s) IV Intermittent every 24 hours  cefTRIAXone   IVPB 1000 milliGRAM(s) IV Intermittent every 24 hours  cefTRIAXone   IVPB      cholecalciferol 2000 Unit(s) Oral daily  dexAMETHasone  Injectable 6 milliGRAM(s) IV Push daily  enoxaparin Injectable 40 milliGRAM(s) SubCutaneous every 12 hours  famotidine    Tablet 40 milliGRAM(s) Oral two times a day  levothyroxine 100 MICROGram(s) Oral daily  montelukast 10 milliGRAM(s) Oral at bedtime  remdesivir  IVPB   IV Intermittent   remdesivir  IVPB 100 milliGRAM(s) IV Intermittent every 24 hours  zinc sulfate 220 milliGRAM(s) Oral daily      MEDICATIONS  (PRN):  acetaminophen   Tablet .. 650 milliGRAM(s) Oral every 6 hours PRN Temp greater or equal to 38C (100.4F), Mild Pain (1 - 3)      Physical Exam    Neuro :  no focal deficits  Respiratory: CTA B/L  CV: RRR, S1S2, no murmurs,   Abdominal: Soft, NT, ND +BS,  Extremities: No edema, + peripheral pulses          ASSESSMENT    Infection due to severe acute respiratory syndrome coronavirus 2 (SARS-CoV-2)  pneumonia,   hypoxia,   h/o hypothyroid,   sleep apnea,   PUD,   cervical OA   tracheal resection and reconstruction,   uterus myoma,   HTN,   gi bleed  Osteoporosis  Hyperparathyroidism      PLAN    rocephin, zithromax,   id f/u  cont remdesivir  cont vit c, vitamin d, zinc, pepcid, singulair, decadron,   cont contact and airborne isolation,   cont albuterol inhaler,   cont tylenol prn, robitussin prn   pulm f/u   O2 sat (87% - 96%)   cont high flow O2 via nasal canula as needed,   pt still with low grade temp  dvt prophylaxis  covid-19 antibody test neg,   resp viral panel positive for covid 19,   procalcitonin, D-dimer wnl noted above,   esr, crp, ldh, ferritin elevated noted above,   f/u bld cx, ua, ucx,  gi cons.   f/u stool studies   cont current meds

## 2020-12-03 NOTE — PROGRESS NOTE ADULT - SUBJECTIVE AND OBJECTIVE BOX
INTERVAL HPI/OVERNIGHT EVENTS: ***      Antimicrobial:  azithromycin  IVPB      azithromycin  IVPB 500 milliGRAM(s) IV Intermittent every 24 hours  cefTRIAXone   IVPB 1000 milliGRAM(s) IV Intermittent every 24 hours  cefTRIAXone   IVPB      remdesivir  IVPB   IV Intermittent   remdesivir  IVPB 100 milliGRAM(s) IV Intermittent every 24 hours    Cardiovascular:    Pulmonary:  montelukast 10 milliGRAM(s) Oral at bedtime    Hematalogic:  enoxaparin Injectable 40 milliGRAM(s) SubCutaneous every 12 hours    Other:  acetaminophen   Tablet .. 650 milliGRAM(s) Oral every 6 hours PRN  ALPRAZolam 2 milliGRAM(s) Oral at bedtime  ascorbic acid 1000 milliGRAM(s) Oral daily  cholecalciferol 2000 Unit(s) Oral daily  dexAMETHasone  Injectable 6 milliGRAM(s) IV Push daily  famotidine    Tablet 40 milliGRAM(s) Oral two times a day  levothyroxine 100 MICROGram(s) Oral daily  zinc sulfate 220 milliGRAM(s) Oral daily    acetaminophen   Tablet .. 650 milliGRAM(s) Oral every 6 hours PRN  ALPRAZolam 2 milliGRAM(s) Oral at bedtime  ascorbic acid 1000 milliGRAM(s) Oral daily  azithromycin  IVPB      azithromycin  IVPB 500 milliGRAM(s) IV Intermittent every 24 hours  cefTRIAXone   IVPB 1000 milliGRAM(s) IV Intermittent every 24 hours  cefTRIAXone   IVPB      cholecalciferol 2000 Unit(s) Oral daily  dexAMETHasone  Injectable 6 milliGRAM(s) IV Push daily  enoxaparin Injectable 40 milliGRAM(s) SubCutaneous every 12 hours  famotidine    Tablet 40 milliGRAM(s) Oral two times a day  levothyroxine 100 MICROGram(s) Oral daily  montelukast 10 milliGRAM(s) Oral at bedtime  remdesivir  IVPB   IV Intermittent   remdesivir  IVPB 100 milliGRAM(s) IV Intermittent every 24 hours  zinc sulfate 220 milliGRAM(s) Oral daily    Drug Dosing Weight  Height (cm): 162.6 (01 Dec 2020 09:53)  Weight (kg): 85.7 (01 Dec 2020 09:53)  BMI (kg/m2): 32.4 (01 Dec 2020 09:53)  BSA (m2): 1.91 (01 Dec 2020 09:53)    PRESSORS: [ ] YES [ ]No  WHICH:    CENTRAL LINE: [ ] YES [ ] NO  LOCATION:   DATE INSERTED:  REMOVE: [ ] YES [ ] NO  EXPLAIN:    DONALD: [ ] YES [ ] NO    DATE INSERTED:  REMOVE:  [ ] YES [ ] NO  EXPLAIN:    A-LINE:  [ ] YES [ ] NO  LOCATION:   DATE INSERTED:  REMOVE:  [ ] YES [ ] NO  EXPLAIN:    PMH -reviewed admission note, no change since admission  PAST MEDICAL & SURGICAL HISTORY:  Lumbar back pain    Osteoporosis    History of tracheal stenosis    Other specified diseases of upper respiratory tract    Hypothyroidism    Kidney stones    Gallstones    Fatty liver    Thyroid disease    Obesity    Constipation    Gastric ulcer  February 2016- h/o GI bleed which patient had to be intubated Jan 2016    Arthritis of shoulder region, right    Arthritis  right shoulder    S/P bronchoscopy  2017    S/P bronchoscopy  5/16    Hyperparathyroidism  2012 parathyroidectomy    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    Gastric ulcer  &quot;four endoscopies&quot; for diagnosis of gastric ulcer in February 2016        ICU Vital Signs Last 24 Hrs  T(C): 37.3 (03 Dec 2020 05:00), Max: 37.3 (03 Dec 2020 05:00)  T(F): 99.2 (03 Dec 2020 05:00), Max: 99.2 (03 Dec 2020 05:00)  HR: 67 (03 Dec 2020 06:00) (62 - 86)  BP: 131/81 (03 Dec 2020 06:00) (102/69 - 164/88)  BP(mean): 91 (03 Dec 2020 06:00) (76 - 108)  ABP: --  ABP(mean): --  RR: 35 (03 Dec 2020 06:00) (19 - 50)  SpO2: 88% (03 Dec 2020 06:00) (87% - 96%)      ABG - ( 02 Dec 2020 12:08 )  pH, Arterial: 7.44  pH, Blood: x     /  pCO2: 37    /  pO2: 62    / HCO3: 25    / Base Excess: 1.6   /  SaO2: 92                    12-02 @ 07:01  -  12-03 @ 07:00  --------------------------------------------------------  IN: 740 mL / OUT: 1300 mL / NET: -560 mL            PHYSICAL EXAM:    GENERAL: NAD, well-groomed, well-developed  HEAD:  Atraumatic, Normocephalic  EYES: EOMI, PERRLA, conjunctiva and sclera clear  ENMT: No tonsillar erythema, exudates, or enlargement; Moist mucous membranes, Good dentition, No lesions  NECK: Supple, normal appearance, No JVD; Normal thyroid; Trachea midline  NERVOUS SYSTEM:  Alert & Oriented X3, Good concentration; Motor Strength 5/5 B/L upper and lower extremities; DTRs 2+ intact and symmetric  CHEST/LUNG: No chest deformity; Normal percussion bilaterally; No rales, rhonchi, wheezing   HEART: Regular rate and rhythm; No murmurs, rubs, or gallops  ABDOMEN: Soft, Nontender, Nondistended; Bowel sounds present  EXTREMITIES:  2+ Peripheral Pulses, No clubbing, cyanosis, or edema  LYMPH: No lymphadenopathy noted  SKIN: No rashes or lesions; Good capillary refill      LABS:  CBC Full  -  ( 03 Dec 2020 06:26 )  WBC Count : 12.34 K/uL  RBC Count : 4.33 M/uL  Hemoglobin : 13.2 g/dL  Hematocrit : 41.6 %  Platelet Count - Automated : 378 K/uL  Mean Cell Volume : 96.1 fl  Mean Cell Hemoglobin : 30.5 pg  Mean Cell Hemoglobin Concentration : 31.7 gm/dL  Auto Neutrophil # : 10.09 K/uL  Auto Lymphocyte # : 1.49 K/uL  Auto Monocyte # : 0.57 K/uL  Auto Eosinophil # : 0.00 K/uL  Auto Basophil # : 0.01 K/uL  Auto Neutrophil % : 81.7 %  Auto Lymphocyte % : 12.1 %  Auto Monocyte % : 4.6 %  Auto Eosinophil % : 0.0 %  Auto Basophil % : 0.1 %    12-03    137  |  103  |  15  ----------------------------<  113<H>  4.3   |  25  |  0.55    Ca    10.8<H>      03 Dec 2020 06:26  Phos  2.5     12-03  Mg     2.1     12-03    TPro  7.7  /  Alb  2.4<L>  /  TBili  0.3  /  DBili  x   /  AST  31  /  ALT  46  /  AlkPhos  66  12-03    PT/INR - ( 01 Dec 2020 11:26 )   PT: 12.7 sec;   INR: 1.07 ratio         PTT - ( 01 Dec 2020 11:26 )  PTT:32.4 sec        RADIOLOGY & ADDITIONAL STUDIES REVIEWED:  ***    [ ]GOALS OF CARE DISCUSSION WITH PATIENT/FAMILY/PROXY:    CRITICAL CARE TIME SPENT: 35 minutes INTERVAL HPI/OVERNIGHT EVENTS:     Pt is on high flow 55L 100% overnight and saturated 88-95%. Pt states she is feeling well.       Antimicrobial:  azithromycin  IVPB      azithromycin  IVPB 500 milliGRAM(s) IV Intermittent every 24 hours  cefTRIAXone   IVPB 1000 milliGRAM(s) IV Intermittent every 24 hours  cefTRIAXone   IVPB      remdesivir  IVPB   IV Intermittent   remdesivir  IVPB 100 milliGRAM(s) IV Intermittent every 24 hours    Cardiovascular:    Pulmonary:  montelukast 10 milliGRAM(s) Oral at bedtime    Hematalogic:  enoxaparin Injectable 40 milliGRAM(s) SubCutaneous every 12 hours    Other:  acetaminophen   Tablet .. 650 milliGRAM(s) Oral every 6 hours PRN  ALPRAZolam 2 milliGRAM(s) Oral at bedtime  ascorbic acid 1000 milliGRAM(s) Oral daily  cholecalciferol 2000 Unit(s) Oral daily  dexAMETHasone  Injectable 6 milliGRAM(s) IV Push daily  famotidine    Tablet 40 milliGRAM(s) Oral two times a day  levothyroxine 100 MICROGram(s) Oral daily  zinc sulfate 220 milliGRAM(s) Oral daily    acetaminophen   Tablet .. 650 milliGRAM(s) Oral every 6 hours PRN  ALPRAZolam 2 milliGRAM(s) Oral at bedtime  ascorbic acid 1000 milliGRAM(s) Oral daily  azithromycin  IVPB      azithromycin  IVPB 500 milliGRAM(s) IV Intermittent every 24 hours  cefTRIAXone   IVPB 1000 milliGRAM(s) IV Intermittent every 24 hours  cefTRIAXone   IVPB      cholecalciferol 2000 Unit(s) Oral daily  dexAMETHasone  Injectable 6 milliGRAM(s) IV Push daily  enoxaparin Injectable 40 milliGRAM(s) SubCutaneous every 12 hours  famotidine    Tablet 40 milliGRAM(s) Oral two times a day  levothyroxine 100 MICROGram(s) Oral daily  montelukast 10 milliGRAM(s) Oral at bedtime  remdesivir  IVPB   IV Intermittent   remdesivir  IVPB 100 milliGRAM(s) IV Intermittent every 24 hours  zinc sulfate 220 milliGRAM(s) Oral daily    Drug Dosing Weight  Height (cm): 162.6 (01 Dec 2020 09:53)  Weight (kg): 85.7 (01 Dec 2020 09:53)  BMI (kg/m2): 32.4 (01 Dec 2020 09:53)  BSA (m2): 1.91 (01 Dec 2020 09:53)    PRESSORS: [ ] YES [x ]No  WHICH:    CENTRAL LINE: [ ] YES [x ] NO  LOCATION:   DATE INSERTED:  REMOVE: [ ] YES [ ] NO  EXPLAIN:    DONALD: [ ] YES [x ] NO    DATE INSERTED:  REMOVE:  [ ] YES [ ] NO  EXPLAIN:    A-LINE:  [ ] YES [x ] NO  LOCATION:   DATE INSERTED:  REMOVE:  [ ] YES [ ] NO  EXPLAIN:    PMH -reviewed admission note, no change since admission  PAST MEDICAL & SURGICAL HISTORY:  Lumbar back pain    Osteoporosis    History of tracheal stenosis    Other specified diseases of upper respiratory tract    Hypothyroidism    Kidney stones    Gallstones    Fatty liver    Thyroid disease    Obesity    Constipation    Gastric ulcer  February 2016- h/o GI bleed which patient had to be intubated Jan 2016    Arthritis of shoulder region, right    Arthritis  right shoulder    S/P bronchoscopy  2017    S/P bronchoscopy  5/16    Hyperparathyroidism  2012 parathyroidectomy    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    Gastric ulcer  &quot;four endoscopies&quot; for diagnosis of gastric ulcer in February 2016        ICU Vital Signs Last 24 Hrs  T(C): 37.3 (03 Dec 2020 05:00), Max: 37.3 (03 Dec 2020 05:00)  T(F): 99.2 (03 Dec 2020 05:00), Max: 99.2 (03 Dec 2020 05:00)  HR: 67 (03 Dec 2020 06:00) (62 - 86)  BP: 131/81 (03 Dec 2020 06:00) (102/69 - 164/88)  BP(mean): 91 (03 Dec 2020 06:00) (76 - 108)  ABP: --  ABP(mean): --  RR: 35 (03 Dec 2020 06:00) (19 - 50)  SpO2: 88% (03 Dec 2020 06:00) (87% - 96%)      ABG - ( 02 Dec 2020 12:08 )  pH, Arterial: 7.44  pH, Blood: x     /  pCO2: 37    /  pO2: 62    / HCO3: 25    / Base Excess: 1.6   /  SaO2: 92                    12-02 @ 07:01  -  12-03 @ 07:00  --------------------------------------------------------  IN: 740 mL / OUT: 1300 mL / NET: -560 mL            PHYSICAL EXAM:    GENERAL: Anxious, well-groomed, well-developed  HEAD:  Atraumatic, Normocephalic  EYES: EOMI, PERRLA, conjunctiva and sclera clear  ENMT: No tonsillar erythema, exudates, or enlargement; Moist mucous membranes, No lesions  NECK: Supple, normal appearance, No JVD; Normal thyroid; Trachea midline  NERVOUS SYSTEM:  Alert & Oriented X3, Good concentration; Moving all extremities   CHEST/LUNG: No chest deformity; Normal percussion bilaterally; No rales, mild b/l rhonchi, no wheezing   HEART: Regular rate and rhythm; No murmurs, rubs, or gallops  ABDOMEN: Soft, Nontender, Nondistended; Bowel sounds present  EXTREMITIES:  2+ Peripheral Pulses, No clubbing, cyanosis, or edema  LYMPH: No lymphadenopathy noted  SKIN: No rashes or lesions; Good capillary refill      LABS:  CBC Full  -  ( 03 Dec 2020 06:26 )  WBC Count : 12.34 K/uL  RBC Count : 4.33 M/uL  Hemoglobin : 13.2 g/dL  Hematocrit : 41.6 %  Platelet Count - Automated : 378 K/uL  Mean Cell Volume : 96.1 fl  Mean Cell Hemoglobin : 30.5 pg  Mean Cell Hemoglobin Concentration : 31.7 gm/dL  Auto Neutrophil # : 10.09 K/uL  Auto Lymphocyte # : 1.49 K/uL  Auto Monocyte # : 0.57 K/uL  Auto Eosinophil # : 0.00 K/uL  Auto Basophil # : 0.01 K/uL  Auto Neutrophil % : 81.7 %  Auto Lymphocyte % : 12.1 %  Auto Monocyte % : 4.6 %  Auto Eosinophil % : 0.0 %  Auto Basophil % : 0.1 %    12-03    137  |  103  |  15  ----------------------------<  113<H>  4.3   |  25  |  0.55    Ca    10.8<H>      03 Dec 2020 06:26  Phos  2.5     12-03  Mg     2.1     12-03    TPro  7.7  /  Alb  2.4<L>  /  TBili  0.3  /  DBili  x   /  AST  31  /  ALT  46  /  AlkPhos  66  12-03    PT/INR - ( 01 Dec 2020 11:26 )   PT: 12.7 sec;   INR: 1.07 ratio         PTT - ( 01 Dec 2020 11:26 )  PTT:32.4 sec        RADIOLOGY & ADDITIONAL STUDIES REVIEWED:  ***    [ ]GOALS OF CARE DISCUSSION WITH PATIENT/FAMILY/PROXY:    CRITICAL CARE TIME SPENT: 35 minutes

## 2020-12-03 NOTE — PROGRESS NOTE ADULT - ASSESSMENT
1. PNA 2nd to Covid-19  - PCR positive. IGG Negative.   - CXR noted.  - Continue abx  - Continue Vit C, Vit D, Zinc   - Continue Dexamethasone   - Continue Remdesivir   - Continue Singulair and Pepcid   - Robitussin PRN for cough  - Tylenol PRN for temp   - O2 Supp - NRM - ICU to eval as pt may need High-Flow.   - Monitor O2 Sat.  - Monitor labs  - F.U CXR  - Prone positioning as tolerated   - DVT and GI PPX     2. DARRELL   - Newly diagnosed.   - Currently being treated for Covid-19.  -Hold Bi-pap machine for now     3. Gastric Ulcer  - Stool Studies  - Monitor labs  - Continue meds  - GI Eval.     4. Hx of Intubation   - S/P intubation x 5 days in 2016 for GIB  - Trachea was damaged during intubation.  - S/P tracheal resection and reconstruction.   - S/P surveillance bronchoscopies   - Request resuscitation measures if needed but wants to be DNI

## 2020-12-03 NOTE — PHYSICAL THERAPY INITIAL EVALUATION ADULT - PERTINENT HX OF CURRENT PROBLEM, REHAB EVAL
Patient admitted from home due to PNA -COVID; patient w/ h/o Hypothyroid, sleep apnea, PUD, cervical OA, DARRELL

## 2020-12-03 NOTE — PROGRESS NOTE ADULT - ASSESSMENT
ASSESSMENT AND PLAN:  59 yo F from home lives with son (who is autistic, covid +) PMHx of hypothyroid, sleep apnea, HTN PUD, cervical OA PSH tracheal resection and reconstruction, uterous myoma, presents to the ED c/o shortness of breath x 2 days. Patient underwent sleep studyfrom 11/20 to 11/21 ,diagnosed with DARRELL, spiked fever the day after 101.6 associated with headache, and later on cough and diarrhea and tested positive for COVID on 111/23rd. pt has been having fever and diarrhea since Dx. Pt notes onset of  worsening shortness of breath  for past 2 days.  Patient denies vomiting, rash , joint pain or any other acute complaints. Pt started ABx likley amoxicillin (does not remember te name ) and Decadron 6mg X5 days.    Of note : pt reports h/o massive GIB resulted in emergency intubation for 5 days. Pt reports her trachea was damaged as a result of intubation. s/p tracheal resection and reconstruction April 2016. She has undergone multiple surveillance bronchoscopies,     ED course : patient saturated on high 80s on RA on arrival , was placed on NRB and descalated to NC 4 liters, pt saturated 96% on 4L NC,  on my evaluation patient was saturating 97% on 2 L NC however tachypneic while talking or getting exited.  CXR : patchy basilar infiltrates   (01 Dec 2020 13:01)    ICU reconsulted for worsening of respiratory status. Pt saturating 93% on 100% NRB. Pt  also started on REmdesivir today.     Assessment: Acute hypoxic respiratory failure due to COVID PNA, with suspected super-imposed bacterial PNA, Sleep Apnea, PUD, hypothyroidism       === Neuro===  Alert and oriented x 3    === Cardiovascular===  HTN: not on any medication at home   - continue to monitor for now.       ===- Pulm===  Acute hypooxic respiratory failure due to COVID PNA with concern for superficial bacterial infection :  c/w Decadron, Remedesivir,   - c/w Rocephin and Azith   - Presently on NRB saturating 93% on rest  - ABG ordered: follow results, plan to start  hi flow   - bed rest   - Dr Nelson follow up   -LOVenox DVT proph for COVID: 40 mg BID sub cut   - Follow inflammatory markers.       sleep Apnea : pt recently had Sleep study done and diagnosed with Sleep apnea on NOv 20 but not started on CPAP yet   OP follow up       ====ID===  PNA: management as above     === Nephro===  no active issues     ===GI===  PUD: Protonix     === Heme===  No issues.    ===Endocrine===  hypothyroidism ; c/w Levothyroxine     === Skin/Catheters===  No rashes. Peripheral IV lines.     ===Prophylaxis ===  LOVenox DVT proph for COVID: 40 mg BID sub cut   Protonix for  GI proph    ===GOC===  DNR/DNI, discussed with pt and daughter over phone and MOLST form signed and placed in the chart.     ===Disposition===  Pt accpeted to ICU for further care   ASSESSMENT AND PLAN:  59 yo F from home lives with son (who is autistic, covid +) PMHx of hypothyroid, sleep apnea, HTN PUD, cervical OA PSH tracheal resection and reconstruction, uterous myoma, presents to the ED c/o shortness of breath x 2 days. Patient underwent sleep studyfrom 11/20 to 11/21 ,diagnosed with DARRELL, spiked fever the day after 101.6 associated with headache, and later on cough and diarrhea and tested positive for COVID on 111/23rd. pt has been having fever and diarrhea since Dx. Pt notes onset of  worsening shortness of breath  for past 2 days.  Patient denies vomiting, rash , joint pain or any other acute complaints. Pt started ABx likley amoxicillin (does not remember te name ) and Decadron 6mg X5 days.    Of note : pt reports h/o massive GIB resulted in emergency intubation for 5 days. Pt reports her trachea was damaged as a result of intubation. s/p tracheal resection and reconstruction April 2016. She has undergone multiple surveillance bronchoscopies,    ED course : patient saturated on high 80s on RA on arrival , was placed on NRB and descalated to NC 4 liters, pt saturated 96% on 4L NC,  on my evaluation patient was saturating 97% on 2 L NC however tachypneic while talking or getting exited.  CXR : patchy basilar infiltrates   (01 Dec 2020 13:01)  Pt was saturating 93% on 100% NRB, transferred to ICU for high flow oxygen therapy.     Assessment:   Acute hypoxic respiratory failure due to COVID PNA   Sleep Apnea,   PUD, hypothyroidism     === Neuro===  Alert and oriented x 3    === Cardiovascular===  HTN: not on any medication at home   - continue to monitor for now.     ===- Pulm===  Acute hypooxic respiratory failure due to COVID PNA   c/w Decadron, Remedesivir,   - s/p  Rocephin and Azith 12/1-3, Dcd as CXR consistent with Covid PNA  - On high flow 100% 55L, taper down as tolerated   ID Dr Ojeda  -Coty DVT proph 40 mg BID switched to daily as D-Dimer is <150   - Follow inflammatory markers.     #sleep Apnea : pt recently had Sleep study done and diagnosed with Sleep apnea on NOv 20 but not started on CPAP yet   OP follow up     ====ID===  PNA: management as above     === Nephro===  no active issues     ===GI===  #Hx of PUD:   - c/w Protonix     === Heme===  #Leukocytosis   - likley due to covid infection  - monitor CBC    ===Endocrine===  #hypothyroidism   - TSH low 0.14  - f/u free T4 and total T3   c/w Levothyroxine     === Skin/Catheters===  No rashes. Peripheral IV lines.     ===Prophylaxis ===  LOVenox 40mg khan for DVT proph   Protonix for  GI proph    ===GOC===  DNR/DNI, discussed with pt and daughter over phone and MOLST form signed and placed in the chart.     ===Disposition===  Monitor in ICU   ASSESSMENT AND PLAN:  61 yo F from home lives with son (who is autistic, covid +) PMHx of hypothyroid, sleep apnea, HTN PUD, cervical OA PSH tracheal resection and reconstruction, uterous myoma, presents to the ED c/o shortness of breath x 2 days. Patient underwent sleep studyfrom 11/20 to 11/21 ,diagnosed with DARRELL, spiked fever the day after 101.6 associated with headache, and later on cough and diarrhea and tested positive for COVID on 111/23rd. pt has been having fever and diarrhea since Dx. Pt notes onset of  worsening shortness of breath  for past 2 days.  Patient denies vomiting, rash , joint pain or any other acute complaints. Pt started ABx likley amoxicillin (does not remember te name ) and Decadron 6mg X5 days.    Of note : pt reports h/o massive GIB resulted in emergency intubation for 5 days. Pt reports her trachea was damaged as a result of intubation. s/p tracheal resection and reconstruction April 2016. She has undergone multiple surveillance bronchoscopies,    ED course : patient saturated on high 80s on RA on arrival , was placed on NRB and descalated to NC 4 liters, pt saturated 96% on 4L NC,  on my evaluation patient was saturating 97% on 2 L NC however tachypneic while talking or getting exited.  CXR : patchy basilar infiltrates   (01 Dec 2020 13:01)  Pt was saturating 93% on 100% NRB, transferred to ICU for high flow oxygen therapy.     Assessment:   Acute hypoxic respiratory failure due to COVID PNA   Sleep Apnea,   PUD, hypothyroidism     === Neuro===  Alert and oriented x 3    #Central sleep apnea?  Called her neurologist Dr. Greg Moreno [phone] for details, but no answer    === Cardiovascular===  HTN: not on any medication at home   - continue to monitor for now.     ===- Pulm===  Acute hypooxic respiratory failure due to COVID PNA   c/w Decadron, Remedesivir,   - s/p  Rocephin and Azith 12/1-3, Dcd as CXR consistent with Covid PNA  - On high flow 100% 55L, taper down as tolerated   ID Dr Ojeda  -Coty DVT proph 40 mg BID switched to daily as D-Dimer is <150   - Follow inflammatory markers.     #sleep Apnea : pt recently had Sleep study done and diagnosed with Sleep apnea on NOv 20 but not started on CPAP yet   OP follow up     ====ID===  PNA: management as above     === Nephro===  no active issues     ===GI===  #Hx of PUD:   - c/w Protonix     === Heme===  #Leukocytosis   - likley due to covid infection  - monitor CBC    ===Endocrine===  #hypothyroidism   - TSH low 0.14  - f/u free T4 and total T3   c/w Levothyroxine     === Skin/Catheters===  No rashes. Peripheral IV lines.     ===Prophylaxis ===  LOVenox 40mg khan for DVT proph   Protonix for  GI proph    ===GOC===  DNR/DNI, discussed with pt and daughter over phone and MOLST form signed and placed in the chart.     ===Disposition===  Monitor in ICU

## 2020-12-03 NOTE — PROGRESS NOTE ADULT - SUBJECTIVE AND OBJECTIVE BOX
60y Female    Meds:  remdesivir  IVPB   IV Intermittent   remdesivir  IVPB 100 milliGRAM(s) IV Intermittent every 24 hours    Allergies    No Known Allergies    Intolerances        VITALS:  Vital Signs Last 24 Hrs  T(C): 37.1 (03 Dec 2020 12:00), Max: 37.3 (03 Dec 2020 05:00)  T(F): 98.7 (03 Dec 2020 12:00), Max: 99.2 (03 Dec 2020 05:00)  HR: 75 (03 Dec 2020 18:00) (60 - 86)  BP: 131/74 (03 Dec 2020 18:00) (102/69 - 148/85)  BP(mean): 88 (03 Dec 2020 18:00) (76 - 103)  RR: 22 (03 Dec 2020 18:00) (19 - 45)  SpO2: 91% (03 Dec 2020 18:00) (88% - 96%)    LABS/DIAGNOSTIC TESTS:                          13.2   12.34 )-----------( 378      ( 03 Dec 2020 06:26 )             41.6         12-03    137  |  103  |  15  ----------------------------<  113<H>  4.3   |  25  |  0.55    Ca    10.8<H>      03 Dec 2020 06:26  Phos  2.5     12-03  Mg     2.1     12-03    TPro  7.7  /  Alb  2.4<L>  /  TBili  0.3  /  DBili  x   /  AST  31  /  ALT  46  /  AlkPhos  66  12-03      LIVER FUNCTIONS - ( 03 Dec 2020 06:26 )  Alb: 2.4 g/dL / Pro: 7.7 g/dL / ALK PHOS: 66 U/L / ALT: 46 U/L DA / AST: 31 U/L / GGT: x             CULTURES:       RADIOLOGY:< from: Xray Chest 1 View- PORTABLE-Urgent (Xray Chest 1 View- PORTABLE-Urgent .) (12.02.20 @ 11:47) >  EXAM:  XR CHEST PORTABLE URGENT 1V                            PROCEDURE DATE:  12/02/2020          INTERPRETATION:  INDICATION: Shortness of breath.    PRIORS: 4/9/2016.    VIEWS: Portable AP radiography of the chest performed.    FINDINGS: Heart size appears within normal limits. No superior mediastinal widening is identified. Bilateral lung parenchymal airspace opacities suggesting bilateral pneumonia. No pleural effusion. No pneumothorax. No mediastinal shift. No osseous fractures.    IMPRESSION: Bilateral lung parenchymal airspace opacities suggests bilateral pneumonia.              MACHELLE VALIENTE MD; Attending Radiologist  This document has been electronically signed. Dec  2 2020 12:49PM    < end of copied text >        ROS:  [  ] UNABLE TO ELICIT ICU VISIT  60y Female who remains in the ICU on high flow oxygen, she has no fevers or chills, she is still coughing but less, she has sob but is comfortable on the high flow oxygen, she has no diarrhea , she still has pleuritic chest pain.    Meds:  remdesivir  IVPB   IV Intermittent   remdesivir  IVPB 100 milliGRAM(s) IV Intermittent every 24 hours    Allergies    No Known Allergies    Intolerances        VITALS:  Vital Signs Last 24 Hrs  T(C): 37.1 (03 Dec 2020 12:00), Max: 37.3 (03 Dec 2020 05:00)  T(F): 98.7 (03 Dec 2020 12:00), Max: 99.2 (03 Dec 2020 05:00)  HR: 75 (03 Dec 2020 18:00) (60 - 86)  BP: 131/74 (03 Dec 2020 18:00) (102/69 - 148/85)  BP(mean): 88 (03 Dec 2020 18:00) (76 - 103)  RR: 22 (03 Dec 2020 18:00) (19 - 45)  SpO2: 91% (03 Dec 2020 18:00) (88% - 96%)    LABS/DIAGNOSTIC TESTS:                          13.2   12.34 )-----------( 378      ( 03 Dec 2020 06:26 )             41.6         12-03    137  |  103  |  15  ----------------------------<  113<H>  4.3   |  25  |  0.55    Ca    10.8<H>      03 Dec 2020 06:26  Phos  2.5     12-03  Mg     2.1     12-03    TPro  7.7  /  Alb  2.4<L>  /  TBili  0.3  /  DBili  x   /  AST  31  /  ALT  46  /  AlkPhos  66  12-03      LIVER FUNCTIONS - ( 03 Dec 2020 06:26 )  Alb: 2.4 g/dL / Pro: 7.7 g/dL / ALK PHOS: 66 U/L / ALT: 46 U/L DA / AST: 31 U/L / GGT: x             CULTURES:       RADIOLOGY:< from: Xray Chest 1 View- PORTABLE-Urgent (Xray Chest 1 View- PORTABLE-Urgent .) (12.02.20 @ 11:47) >  EXAM:  XR CHEST PORTABLE URGENT 1V                            PROCEDURE DATE:  12/02/2020          INTERPRETATION:  INDICATION: Shortness of breath.    PRIORS: 4/9/2016.    VIEWS: Portable AP radiography of the chest performed.    FINDINGS: Heart size appears within normal limits. No superior mediastinal widening is identified. Bilateral lung parenchymal airspace opacities suggesting bilateral pneumonia. No pleural effusion. No pneumothorax. No mediastinal shift. No osseous fractures.    IMPRESSION: Bilateral lung parenchymal airspace opacities suggests bilateral pneumonia.              MACHELLE VALIENTE MD; Attending Radiologist  This document has been electronically signed. Dec  2 2020 12:49PM    < end of copied text >        ROS:  [  ] UNABLE TO ELICIT ICU VISIT  60y Female who remains in the ICU on high flow oxygen, she has no fevers or chills, she is still coughing but less, she has sob but is comfortable on the high flow oxygen, she has no diarrhea , she still has pleuritic chest pain.Her antibiotics were stopped by the ICU team.    Meds:  remdesivir  IVPB   IV Intermittent   remdesivir  IVPB 100 milliGRAM(s) IV Intermittent every 24 hours    Allergies    No Known Allergies    Intolerances        VITALS:  Vital Signs Last 24 Hrs  T(C): 37.1 (03 Dec 2020 12:00), Max: 37.3 (03 Dec 2020 05:00)  T(F): 98.7 (03 Dec 2020 12:00), Max: 99.2 (03 Dec 2020 05:00)  HR: 75 (03 Dec 2020 18:00) (60 - 86)  BP: 131/74 (03 Dec 2020 18:00) (102/69 - 148/85)  BP(mean): 88 (03 Dec 2020 18:00) (76 - 103)  RR: 22 (03 Dec 2020 18:00) (19 - 45)  SpO2: 91% (03 Dec 2020 18:00) (88% - 96%)    LABS/DIAGNOSTIC TESTS:                          13.2   12.34 )-----------( 378      ( 03 Dec 2020 06:26 )             41.6         12-03    137  |  103  |  15  ----------------------------<  113<H>  4.3   |  25  |  0.55    Ca    10.8<H>      03 Dec 2020 06:26  Phos  2.5     12-03  Mg     2.1     12-03    TPro  7.7  /  Alb  2.4<L>  /  TBili  0.3  /  DBili  x   /  AST  31  /  ALT  46  /  AlkPhos  66  12-03      LIVER FUNCTIONS - ( 03 Dec 2020 06:26 )  Alb: 2.4 g/dL / Pro: 7.7 g/dL / ALK PHOS: 66 U/L / ALT: 46 U/L DA / AST: 31 U/L / GGT: x             CULTURES:       RADIOLOGY:< from: Xray Chest 1 View- PORTABLE-Urgent (Xray Chest 1 View- PORTABLE-Urgent .) (12.02.20 @ 11:47) >  EXAM:  XR CHEST PORTABLE URGENT 1V                            PROCEDURE DATE:  12/02/2020          INTERPRETATION:  INDICATION: Shortness of breath.    PRIORS: 4/9/2016.    VIEWS: Portable AP radiography of the chest performed.    FINDINGS: Heart size appears within normal limits. No superior mediastinal widening is identified. Bilateral lung parenchymal airspace opacities suggesting bilateral pneumonia. No pleural effusion. No pneumothorax. No mediastinal shift. No osseous fractures.    IMPRESSION: Bilateral lung parenchymal airspace opacities suggests bilateral pneumonia.              MACHELLE VALIENTE MD; Attending Radiologist  This document has been electronically signed. Dec  2 2020 12:49PM    < end of copied text >        ROS:  [  ] UNABLE TO ELICIT

## 2020-12-03 NOTE — PROGRESS NOTE ADULT - ASSESSMENT
COVID -19 infection /pneumonia  Hypoxia  Fevers - resolved  Leukocytosis - mild    Plan - Cont Rocephin 1 gm iv q24hrs  Cont Azithromycin 500mgs iv q24 hrs  Cont Decadron 6mgs iv q24hrs  Cont Remdesivir  100mgs iv q24 hrs x 4 days.  time spent - 31 mins.             COVID -19 infection /pneumonia  Hypoxia  Fevers - resolved  Leukocytosis - mild    Plan - OFF  Rocephin and azithro.  Cont Decadron 6mgs iv q24hrs  Cont Remdesivir  100mgs iv q24 hrs x 4 days.  time spent - 31 mins.

## 2020-12-03 NOTE — PROGRESS NOTE ADULT - SUBJECTIVE AND OBJECTIVE BOX
Patient is a 60y old  Female who presents with a chief complaint of COVID PNA requiring oxygen supplementation (03 Dec 2020 07:21)  Awake, alert, comfortable out of bed to chair in NAD. Remains on high flow oxygen  supp    INTERVAL HPI/OVERNIGHT EVENTS:      VITAL SIGNS:  T(F): 99.2 (12-03-20 @ 05:00)  HR: 74 (12-03-20 @ 08:35)  BP: 131/79 (12-03-20 @ 08:00)  RR: 21 (12-03-20 @ 08:35)  SpO2: 94% (12-03-20 @ 08:35)  Wt(kg): --  I&O's Detail    02 Dec 2020 07:01  -  03 Dec 2020 07:00  --------------------------------------------------------  IN:    IV PiggyBack: 300 mL    Oral Fluid: 440 mL  Total IN: 740 mL    OUT:    Voided (mL): 1300 mL  Total OUT: 1300 mL    Total NET: -560 mL              REVIEW OF SYSTEMS:    CONSTITUTIONAL:  No fevers, chills, sweats    HEENT:  Eyes:  No diplopia or blurred vision. ENT:  No earache, sore throat or runny nose.    CARDIOVASCULAR:  No pressure, squeezing, tightness, or heaviness about the chest; no palpitations.    RESPIRATORY:  Per HPI    GASTROINTESTINAL:  No abdominal pain, nausea, vomiting or diarrhea.    GENITOURINARY:  No dysuria, frequency or urgency.    NEUROLOGIC:  No paresthesias, fasciculations, seizures or weakness.    PSYCHIATRIC:  No disorder of thought or mood.      PHYSICAL EXAM:    Constitutional: Well developed and nourished  Eyes:Perrla  ENMT: normal  Neck:supple  Respiratory: Rales post  Cardiovascular: S1 S2 regular  Gastrointestinal: Soft, Non tender  Extremities: No edema  Vascular:normal  Neurological:Awake, alert,Ox3  Musculoskeletal:Normal      MEDICATIONS  (STANDING):  ALPRAZolam 2 milliGRAM(s) Oral at bedtime  chlorhexidine 2% Cloths 1 Application(s) Topical <User Schedule>  dexAMETHasone  Injectable 6 milliGRAM(s) IV Push daily  enoxaparin Injectable 40 milliGRAM(s) SubCutaneous daily  levothyroxine 100 MICROGram(s) Oral daily  polyethylene glycol 3350 17 Gram(s) Oral daily  remdesivir  IVPB   IV Intermittent   remdesivir  IVPB 100 milliGRAM(s) IV Intermittent every 24 hours  senna 2 Tablet(s) Oral at bedtime    MEDICATIONS  (PRN):  acetaminophen   Tablet .. 650 milliGRAM(s) Oral every 6 hours PRN Temp greater or equal to 38C (100.4F), Mild Pain (1 - 3)      Allergies    No Known Allergies    Intolerances        LABS:                        13.2   12.34 )-----------( 378      ( 03 Dec 2020 06:26 )             41.6     12-03    137  |  103  |  15  ----------------------------<  113<H>  4.3   |  25  |  0.55    Ca    10.8<H>      03 Dec 2020 06:26  Phos  2.5     12-03  Mg     2.1     12-03    TPro  7.7  /  Alb  2.4<L>  /  TBili  0.3  /  DBili  x   /  AST  31  /  ALT  46  /  AlkPhos  66  12-03        ABG - ( 02 Dec 2020 12:08 )  pH, Arterial: 7.44  pH, Blood: x     /  pCO2: 37    /  pO2: 62    / HCO3: 25    / Base Excess: 1.6   /  SaO2: 92                CARDIAC MARKERS ( 02 Dec 2020 07:11 )  x     / x     / 31 U/L / x     / x          CAPILLARY BLOOD GLUCOSE        pro-bnp -- 12-01 @ 11:26     d-dimer <150  12-01 @ 11:26      RADIOLOGY & ADDITIONAL TESTS:    CXR:  < from: Xray Chest 1 View- PORTABLE-Urgent (Xray Chest 1 View- PORTABLE-Urgent .) (12.02.20 @ 11:47) >  IMPRESSION: Bilateral lung parenchymal airspace opacities suggests bilateral pneumonia.    < end of copied text >    Ct scan chest:    ekg;    echo:

## 2020-12-04 NOTE — PROGRESS NOTE ADULT - SUBJECTIVE AND OBJECTIVE BOX
Pt is awake, alert, lying in bed. On HFNC in ICU. Low threshold for BIPAP.     INTERVAL HPI/OVERNIGHT EVENTS:    VITAL SIGNS:  T(F): 97.7 (12-04-20 @ 13:00)  HR: 74 (12-04-20 @ 13:00)  BP: 146/112 (12-04-20 @ 13:00)  RR: 30 (12-04-20 @ 13:00)  SpO2: 92% (12-04-20 @ 13:00)  Wt(kg): --  I&O's Detail    03 Dec 2020 07:01  -  04 Dec 2020 07:00  --------------------------------------------------------  IN:    IV PiggyBack: 490 mL    Oral Fluid: 1680 mL  Total IN: 2170 mL    OUT:    Voided (mL): 1800 mL  Total OUT: 1800 mL    Total NET: 370 mL    REVIEW OF SYSTEMS:    CONSTITUTIONAL:  No fevers, chills, sweats    HEENT:  Eyes:  No diplopia or blurred vision. ENT:  No earache, sore throat or runny nose.    CARDIOVASCULAR:  No pressure, squeezing, tightness, or heaviness about the chest; no palpitations.    RESPIRATORY:  Per HPI    GASTROINTESTINAL:  No abdominal pain, nausea, vomiting or diarrhea.    GENITOURINARY:  No dysuria, frequency or urgency.    NEUROLOGIC:  No paresthesias, fasciculations, seizures or weakness.    PSYCHIATRIC:  No disorder of thought or mood.      PHYSICAL EXAM:    Constitutional: Well developed and nourished  Eyes: Perrla  ENMT: normal  Neck: supple  Respiratory: good air entry; HFNC   Cardiovascular: S1 S2 regular  Gastrointestinal: Soft, Non tender  Extremities: No edema  Vascular: normal  Neurological: Awake, alert,Ox3  Musculoskeletal: Normal    MEDICATIONS  (STANDING):  ALPRAZolam 2 milliGRAM(s) Oral at bedtime  chlorhexidine 2% Cloths 1 Application(s) Topical <User Schedule>  dexAMETHasone  Injectable 6 milliGRAM(s) IV Push daily  enoxaparin Injectable 40 milliGRAM(s) SubCutaneous daily  levothyroxine 100 MICROGram(s) Oral daily  pantoprazole    Tablet 40 milliGRAM(s) Oral before breakfast  polyethylene glycol 3350 17 Gram(s) Oral daily  remdesivir  IVPB   IV Intermittent   remdesivir  IVPB 100 milliGRAM(s) IV Intermittent every 24 hours  senna 2 Tablet(s) Oral at bedtime    MEDICATIONS  (PRN):  acetaminophen   Tablet .. 650 milliGRAM(s) Oral every 6 hours PRN Temp greater or equal to 38C (100.4F), Mild Pain (1 - 3)    Allergies    No Known Allergies    Intolerances    LABS:                        13.7   15.00 )-----------( 386      ( 04 Dec 2020 06:37 )             41.4     12-04    136  |  103  |  17  ----------------------------<  127<H>  4.3   |  27  |  0.62    Ca    10.9<H>      04 Dec 2020 06:37  Phos  2.2     12-04  Mg     2.3     12-04    TPro  7.3  /  Alb  2.2<L>  /  TBili  0.2  /  DBili  x   /  AST  16  /  ALT  37  /  AlkPhos  62  12-04    CAPILLARY BLOOD GLUCOSE    pro-bnp -- 12-01 @ 11:26     d-dimer <150  12-01 @ 11:26    RADIOLOGY & ADDITIONAL TESTS:    CXR:  < from: Xray Chest 1 View- PORTABLE-Routine (Xray Chest 1 View- PORTABLE-Routine in AM.) (12.04.20 @ 07:57) >  IMPRESSION:    Worsening bilateral infiltrates.    < end of copied text >    Ct scan chest:    ekg;    echo:

## 2020-12-04 NOTE — DIETITIAN INITIAL EVALUATION ADULT. - PROBLEM SELECTOR PLAN 1
presented with SOB , Cough, fever , diarrhea   patient with positive covid testX1 week    CXR  New bilateral lung basilar patchy infiltrates suggesting bilateral pneumonia.   saturating 88% on RA    pt was placed on NRB and deescalated to NC 4 L in Ed , saturates 96%, on 2 L Nc saturates 96% however with tachypnea  -Will send ESR, CRP, Procalcitonin and other COVID markers   -Tylenol PRN for fever  -D- Dimer not elevated, Will start patient on prophylactic Lovenox  -patient exhibiting signs of hypoxia, will consider administration of steroids and/or Remdesivir  -continue to trend D-dimer, CRP, LDH, Troponin, Ferritin, CPK.  - f/u COVID Abs  - started on Abx for CAP Zithromax and Rocephin   - Pulm : Dr Ta   - ID : Dr Engle

## 2020-12-04 NOTE — DIETITIAN INITIAL EVALUATION ADULT. - PERTINENT MEDS FT
MEDICATIONS  (STANDING):  ALPRAZolam 2 milliGRAM(s) Oral at bedtime  chlorhexidine 2% Cloths 1 Application(s) Topical <User Schedule>  dexAMETHasone  Injectable 6 milliGRAM(s) IV Push daily  enoxaparin Injectable 40 milliGRAM(s) SubCutaneous daily  levothyroxine 100 MICROGram(s) Oral daily  pantoprazole    Tablet 40 milliGRAM(s) Oral before breakfast  polyethylene glycol 3350 17 Gram(s) Oral daily  remdesivir  IVPB   IV Intermittent   remdesivir  IVPB 100 milliGRAM(s) IV Intermittent every 24 hours  senna 2 Tablet(s) Oral at bedtime    MEDICATIONS  (PRN):  acetaminophen   Tablet .. 650 milliGRAM(s) Oral every 6 hours PRN Temp greater or equal to 38C (100.4F), Mild Pain (1 - 3)

## 2020-12-04 NOTE — PROGRESS NOTE ADULT - SUBJECTIVE AND OBJECTIVE BOX
INTERVAL HPI/OVERNIGHT EVENTS: Hypoxic at night , Supplemental O2 increased to 100%    PRESSORS: [ ] YES [ ] NO  WHICH:    ANTIBIOTICS:                  DATE STARTED:  ANTIBIOTICS:                  DATE STARTED:  ANTIBIOTICS:                  DATE STARTED:    Antimicrobial:  remdesivir  IVPB   IV Intermittent   remdesivir  IVPB 100 milliGRAM(s) IV Intermittent every 24 hours    Cardiovascular:    Pulmonary:    Hematalogic:  enoxaparin Injectable 40 milliGRAM(s) SubCutaneous daily    Other:  acetaminophen   Tablet .. 650 milliGRAM(s) Oral every 6 hours PRN  ALPRAZolam 2 milliGRAM(s) Oral at bedtime  chlorhexidine 2% Cloths 1 Application(s) Topical <User Schedule>  dexAMETHasone  Injectable 6 milliGRAM(s) IV Push daily  levothyroxine 100 MICROGram(s) Oral daily  pantoprazole    Tablet 40 milliGRAM(s) Oral before breakfast  polyethylene glycol 3350 17 Gram(s) Oral daily  senna 2 Tablet(s) Oral at bedtime  sodium chloride 0.9%. 1000 milliLiter(s) IV Continuous <Continuous>    acetaminophen   Tablet .. 650 milliGRAM(s) Oral every 6 hours PRN  ALPRAZolam 2 milliGRAM(s) Oral at bedtime  chlorhexidine 2% Cloths 1 Application(s) Topical <User Schedule>  dexAMETHasone  Injectable 6 milliGRAM(s) IV Push daily  enoxaparin Injectable 40 milliGRAM(s) SubCutaneous daily  levothyroxine 100 MICROGram(s) Oral daily  pantoprazole    Tablet 40 milliGRAM(s) Oral before breakfast  polyethylene glycol 3350 17 Gram(s) Oral daily  remdesivir  IVPB   IV Intermittent   remdesivir  IVPB 100 milliGRAM(s) IV Intermittent every 24 hours  senna 2 Tablet(s) Oral at bedtime  sodium chloride 0.9%. 1000 milliLiter(s) IV Continuous <Continuous>    Drug Dosing Weight  Height (cm): 162.6 (01 Dec 2020 09:53)  Weight (kg): 85.7 (01 Dec 2020 09:53)  BMI (kg/m2): 32.4 (01 Dec 2020 09:53)  BSA (m2): 1.91 (01 Dec 2020 09:53)    CENTRAL LINE: [ ] YES [ ] NO  LOCATION:   DATE INSERTED:  REMOVE: [ ] YES [ ] NO  EXPLAIN:    DONALD: [ ] YES [ ] NO    DATE INSERTED:  REMOVE:  [ ] YES [ ] NO  EXPLAIN:    A-LINE:  [ ] YES [ ] NO  LOCATION:   DATE INSERTED:  REMOVE:  [ ] YES [ ] NO  EXPLAIN:    PMH -reviewed admission note, no change since admission  PAST MEDICAL & SURGICAL HISTORY:  Lumbar back pain    Osteoporosis    History of tracheal stenosis    Other specified diseases of upper respiratory tract    Hypothyroidism    Kidney stones    Gallstones    Fatty liver    Thyroid disease    Obesity    Constipation    Gastric ulcer  February 2016- h/o GI bleed which patient had to be intubated Jan 2016    Arthritis of shoulder region, right    Arthritis  right shoulder    S/P bronchoscopy  2017    S/P bronchoscopy  5/16    Hyperparathyroidism  2012 parathyroidectomy    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    Gastric ulcer  &quot;four endoscopies&quot; for diagnosis of gastric ulcer in February 2016        ICU Vital Signs Last 24 Hrs  T(C): 36.3 (04 Dec 2020 00:00), Max: 37.1 (03 Dec 2020 12:00)  T(F): 97.4 (04 Dec 2020 00:00), Max: 98.7 (03 Dec 2020 12:00)  HR: 65 (04 Dec 2020 07:00) (60 - 76)  BP: 133/76 (04 Dec 2020 07:00) (109/79 - 172/81)  BP(mean): 91 (04 Dec 2020 07:00) (78 - 102)  ABP: --  ABP(mean): --  RR: 43 (04 Dec 2020 07:00) (16 - 45)  SpO2: 91% (04 Dec 2020 07:00) (83% - 95%)      ABG - ( 02 Dec 2020 12:08 )  pH, Arterial: 7.44  pH, Blood: x     /  pCO2: 37    /  pO2: 62    / HCO3: 25    / Base Excess: 1.6   /  SaO2: 92                    12-03 @ 07:01  -  12-04 @ 07:00  --------------------------------------------------------  IN: 2170 mL / OUT: 1800 mL / NET: 370 mL            PHYSICAL EXAM:    GENERAL: [ ]NAD, [ ]well-groomed, [ ]well-developed  HEAD:  [ ]Atraumatic, [ ]Normocephalic  EYES: [ ]EOMI, [ ]PERRLA, [ ]conjunctiva and sclera clear  ENMT: [ ]No tonsillar erythema, exudates, or enlargement; [ ]Moist mucous membranes, [ ]Good dentition, [ ]No lesions  NECK: [ ]Supple, normal appearance, [ ]No JVD; [ ]Normal thyroid; [ ]Trachea midline  NERVOUS SYSTEM:  [ ]Alert & Oriented X3, [ ]Good concentration; [ ]Motor Strength 5/5 B/L upper and lower extremities; [ ]DTRs 2+ intact and symmetric  CHEST/LUNG: [ ]No chest deformity; [ ]Normal percussion bilaterally; [ ]No rales, rhonchi, wheezing   HEART: [ ]Regular rate and rhythm; [ ]No murmurs, rubs, or gallops  ABDOMEN: [ ]Soft, Nontender, Nondistended; [ ]Bowel sounds present  EXTREMITIES:  [ ]2+ Peripheral Pulses, [ ]No clubbing, cyanosis, or edema  LYMPH: [ ]No lymphadenopathy noted  SKIN: [ ]No rashes or lesions; [ ]Good capillary refill      LABS:  CBC Full  -  ( 04 Dec 2020 06:37 )  WBC Count : 15.00 K/uL  RBC Count : 4.33 M/uL  Hemoglobin : 13.7 g/dL  Hematocrit : 41.4 %  Platelet Count - Automated : 386 K/uL  Mean Cell Volume : 95.6 fl  Mean Cell Hemoglobin : 31.6 pg  Mean Cell Hemoglobin Concentration : 33.1 gm/dL  Auto Neutrophil # : 12.43 K/uL  Auto Lymphocyte # : 1.62 K/uL  Auto Monocyte # : 0.71 K/uL  Auto Eosinophil # : 0.00 K/uL  Auto Basophil # : 0.02 K/uL  Auto Neutrophil % : 82.9 %  Auto Lymphocyte % : 10.8 %  Auto Monocyte % : 4.7 %  Auto Eosinophil % : 0.0 %  Auto Basophil % : 0.1 %    12-04    136  |  103  |  17  ----------------------------<  127<H>  4.3   |  27  |  0.62    Ca    10.9<H>      04 Dec 2020 06:37  Phos  2.2     12-04  Mg     2.3     12-04    TPro  7.3  /  Alb  2.2<L>  /  TBili  0.2  /  DBili  x   /  AST  16  /  ALT  37  /  AlkPhos  62  12-04            RADIOLOGY & ADDITIONAL STUDIES REVIEWED:  ***    [ ]GOALS OF CARE DISCUSSION WITH PATIENT/FAMILY/PROXY:    CRITICAL CARE TIME SPENT: 35 minutes INTERVAL HPI/OVERNIGHT EVENTS: Hypoxic at night , Supplemental O2 increased to 100%, Patient is a bit anxious, her work of breathing is increasing    PRESSORS: [ ] YES [x ] NO  WHICH:    ANTIBIOTICS:                  DATE STARTED:  ANTIBIOTICS:                  DATE STARTED:  ANTIBIOTICS:                  DATE STARTED:    Antimicrobial:  remdesivir  IVPB   IV Intermittent   remdesivir  IVPB 100 milliGRAM(s) IV Intermittent every 24 hours    Cardiovascular:    Pulmonary:    Hematalogic:  enoxaparin Injectable 40 milliGRAM(s) SubCutaneous daily    Other:  acetaminophen   Tablet .. 650 milliGRAM(s) Oral every 6 hours PRN  ALPRAZolam 2 milliGRAM(s) Oral at bedtime  chlorhexidine 2% Cloths 1 Application(s) Topical <User Schedule>  dexAMETHasone  Injectable 6 milliGRAM(s) IV Push daily  levothyroxine 100 MICROGram(s) Oral daily  pantoprazole    Tablet 40 milliGRAM(s) Oral before breakfast  polyethylene glycol 3350 17 Gram(s) Oral daily  senna 2 Tablet(s) Oral at bedtime  sodium chloride 0.9%. 1000 milliLiter(s) IV Continuous <Continuous>    acetaminophen   Tablet .. 650 milliGRAM(s) Oral every 6 hours PRN  ALPRAZolam 2 milliGRAM(s) Oral at bedtime  chlorhexidine 2% Cloths 1 Application(s) Topical <User Schedule>  dexAMETHasone  Injectable 6 milliGRAM(s) IV Push daily  enoxaparin Injectable 40 milliGRAM(s) SubCutaneous daily  levothyroxine 100 MICROGram(s) Oral daily  pantoprazole    Tablet 40 milliGRAM(s) Oral before breakfast  polyethylene glycol 3350 17 Gram(s) Oral daily  remdesivir  IVPB   IV Intermittent   remdesivir  IVPB 100 milliGRAM(s) IV Intermittent every 24 hours  senna 2 Tablet(s) Oral at bedtime  sodium chloride 0.9%. 1000 milliLiter(s) IV Continuous <Continuous>    Drug Dosing Weight  Height (cm): 162.6 (01 Dec 2020 09:53)  Weight (kg): 85.7 (01 Dec 2020 09:53)  BMI (kg/m2): 32.4 (01 Dec 2020 09:53)  BSA (m2): 1.91 (01 Dec 2020 09:53)    CENTRAL LINE: [ ] YES [ x] NO  LOCATION:   DATE INSERTED:  REMOVE: [ ] YES [ ] NO  EXPLAIN:    DONALD: [ ] YES [x ] NO    DATE INSERTED:  REMOVE:  [ ] YES [ ] NO  EXPLAIN:    A-LINE:  [ ] YES [ x] NO  LOCATION:   DATE INSERTED:  REMOVE:  [ ] YES [ ] NO  EXPLAIN:    PMH -reviewed admission note, no change since admission  PAST MEDICAL & SURGICAL HISTORY:  Lumbar back pain    Osteoporosis    History of tracheal stenosis    Other specified diseases of upper respiratory tract    Hypothyroidism    Kidney stones    Gallstones    Fatty liver    Thyroid disease    Obesity    Constipation    Gastric ulcer  February 2016- h/o GI bleed which patient had to be intubated Jan 2016    Arthritis of shoulder region, right    Arthritis  right shoulder    S/P bronchoscopy  2017    S/P bronchoscopy  5/16    Hyperparathyroidism  2012 parathyroidectomy    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    Gastric ulcer  &quot;four endoscopies&quot; for diagnosis of gastric ulcer in February 2016        ICU Vital Signs Last 24 Hrs  T(C): 36.3 (04 Dec 2020 00:00), Max: 37.1 (03 Dec 2020 12:00)  T(F): 97.4 (04 Dec 2020 00:00), Max: 98.7 (03 Dec 2020 12:00)  HR: 65 (04 Dec 2020 07:00) (60 - 76)  BP: 133/76 (04 Dec 2020 07:00) (109/79 - 172/81)  BP(mean): 91 (04 Dec 2020 07:00) (78 - 102)  RR: 43 (04 Dec 2020 07:00) (16 - 45)  SpO2: 91% (04 Dec 2020 07:00) (83% - 95%)      ABG - ( 02 Dec 2020 12:08 )  pH, Arterial: 7.44  pH, Blood: x     /  pCO2: 37    /  pO2: 62    / HCO3: 25    / Base Excess: 1.6   /  SaO2: 92                    12-03 @ 07:01  -  12-04 @ 07:00  --------------------------------------------------------  IN: 2170 mL / OUT: 1800 mL / NET: 370 mL            PHYSICAL EXAM:    GENERAL: [x ]NAD, [ ]well-groomed, [ x]well-developed  HEAD:  [x ]Atraumatic, [ ]Normocephalic  EYES: [ x]EOMI, [ ]PERRLA, [x ]conjunctiva and sclera clear  ENMT: [ x]No tonsillar erythema, exudates, or enlargement; [ ]Moist mucous membranes, [ ]Good dentition, [ ]No lesions  NECK: [ x]Supple, normal appearance, [ ]No JVD; [ ]Normal thyroid; [ ]Trachea midline  NERVOUS SYSTEM:  [x ]Alert & Oriented X3, x[ ]Good concentration; [ ]Motor Strength 5/5 B/L upper and lower extremities; [ ]DTRs 2+ intact and symmetric  CHEST/LUNG: [ x]No chest deformity; [x ]Normal percussion bilaterally; [ ]No rales, rhonchi, wheezing   HEART: [ x]Regular rate and rhythm; [ ]No murmurs, rubs, or gallops  ABDOMEN: [x ]Soft, Nontender, Nondistended; [ ]Bowel sounds present  EXTREMITIES:  [x ]2+ Peripheral Pulses, [ ]No clubbing, cyanosis, or edema  LYMPH: [ x]No lymphadenopathy noted  SKIN: [x ]No rashes or lesions; [ ]Good capillary refill      LABS:  CBC Full  -  ( 04 Dec 2020 06:37 )  WBC Count : 15.00 K/uL  RBC Count : 4.33 M/uL  Hemoglobin : 13.7 g/dL  Hematocrit : 41.4 %  Platelet Count - Automated : 386 K/uL  Mean Cell Volume : 95.6 fl  Mean Cell Hemoglobin : 31.6 pg  Mean Cell Hemoglobin Concentration : 33.1 gm/dL  Auto Neutrophil # : 12.43 K/uL  Auto Lymphocyte # : 1.62 K/uL  Auto Monocyte # : 0.71 K/uL  Auto Eosinophil # : 0.00 K/uL  Auto Basophil # : 0.02 K/uL  Auto Neutrophil % : 82.9 %  Auto Lymphocyte % : 10.8 %  Auto Monocyte % : 4.7 %  Auto Eosinophil % : 0.0 %  Auto Basophil % : 0.1 %    12-04    136  |  103  |  17  ----------------------------<  127<H>  4.3   |  27  |  0.62    Ca    10.9<H>      04 Dec 2020 06:37  Phos  2.2     12-04  Mg     2.3     12-04    TPro  7.3  /  Alb  2.2<L>  /  TBili  0.2  /  DBili  x   /  AST  16  /  ALT  37  /  AlkPhos  62  12-04            RADIOLOGY & ADDITIONAL STUDIES REVIEWED:  yes    [ ]GOALS OF CARE DISCUSSION WITH PATIENT/FAMILY/PROXY:    CRITICAL CARE TIME SPENT: 35 minutes INTERVAL HPI/OVERNIGHT EVENTS: Hypoxic at night , Supplemental O2 increased to 100%, Patient is a bit anxious, her work of breathing is increasing, Patient is persistent to receive Convalscent plasma    PRESSORS: [ ] YES [x ] NO  WHICH:    ANTIBIOTICS:                  DATE STARTED:  ANTIBIOTICS:                  DATE STARTED:  ANTIBIOTICS:                  DATE STARTED:    Antimicrobial:  remdesivir  IVPB   IV Intermittent   remdesivir  IVPB 100 milliGRAM(s) IV Intermittent every 24 hours    Cardiovascular:    Pulmonary:    Hematalogic:  enoxaparin Injectable 40 milliGRAM(s) SubCutaneous daily    Other:  acetaminophen   Tablet .. 650 milliGRAM(s) Oral every 6 hours PRN  ALPRAZolam 2 milliGRAM(s) Oral at bedtime  chlorhexidine 2% Cloths 1 Application(s) Topical <User Schedule>  dexAMETHasone  Injectable 6 milliGRAM(s) IV Push daily  levothyroxine 100 MICROGram(s) Oral daily  pantoprazole    Tablet 40 milliGRAM(s) Oral before breakfast  polyethylene glycol 3350 17 Gram(s) Oral daily  senna 2 Tablet(s) Oral at bedtime  sodium chloride 0.9%. 1000 milliLiter(s) IV Continuous <Continuous>    acetaminophen   Tablet .. 650 milliGRAM(s) Oral every 6 hours PRN  ALPRAZolam 2 milliGRAM(s) Oral at bedtime  chlorhexidine 2% Cloths 1 Application(s) Topical <User Schedule>  dexAMETHasone  Injectable 6 milliGRAM(s) IV Push daily  enoxaparin Injectable 40 milliGRAM(s) SubCutaneous daily  levothyroxine 100 MICROGram(s) Oral daily  pantoprazole    Tablet 40 milliGRAM(s) Oral before breakfast  polyethylene glycol 3350 17 Gram(s) Oral daily  remdesivir  IVPB   IV Intermittent   remdesivir  IVPB 100 milliGRAM(s) IV Intermittent every 24 hours  senna 2 Tablet(s) Oral at bedtime  sodium chloride 0.9%. 1000 milliLiter(s) IV Continuous <Continuous>    Drug Dosing Weight  Height (cm): 162.6 (01 Dec 2020 09:53)  Weight (kg): 85.7 (01 Dec 2020 09:53)  BMI (kg/m2): 32.4 (01 Dec 2020 09:53)  BSA (m2): 1.91 (01 Dec 2020 09:53)    CENTRAL LINE: [ ] YES [ x] NO  LOCATION:   DATE INSERTED:  REMOVE: [ ] YES [ ] NO  EXPLAIN:    DONALD: [ ] YES [x ] NO    DATE INSERTED:  REMOVE:  [ ] YES [ ] NO  EXPLAIN:    A-LINE:  [ ] YES [ x] NO  LOCATION:   DATE INSERTED:  REMOVE:  [ ] YES [ ] NO  EXPLAIN:    PMH -reviewed admission note, no change since admission  PAST MEDICAL & SURGICAL HISTORY:  Lumbar back pain    Osteoporosis    History of tracheal stenosis    Other specified diseases of upper respiratory tract    Hypothyroidism    Kidney stones    Gallstones    Fatty liver    Thyroid disease    Obesity    Constipation    Gastric ulcer  February 2016- h/o GI bleed which patient had to be intubated Jan 2016    Arthritis of shoulder region, right    Arthritis  right shoulder    S/P bronchoscopy  2017    S/P bronchoscopy  5/16    Hyperparathyroidism  2012 parathyroidectomy    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    Gastric ulcer  &quot;four endoscopies&quot; for diagnosis of gastric ulcer in February 2016        ICU Vital Signs Last 24 Hrs  T(C): 36.3 (04 Dec 2020 00:00), Max: 37.1 (03 Dec 2020 12:00)  T(F): 97.4 (04 Dec 2020 00:00), Max: 98.7 (03 Dec 2020 12:00)  HR: 65 (04 Dec 2020 07:00) (60 - 76)  BP: 133/76 (04 Dec 2020 07:00) (109/79 - 172/81)  BP(mean): 91 (04 Dec 2020 07:00) (78 - 102)  RR: 43 (04 Dec 2020 07:00) (16 - 45)  SpO2: 91% (04 Dec 2020 07:00) (83% - 95%)      ABG - ( 02 Dec 2020 12:08 )  pH, Arterial: 7.44  pH, Blood: x     /  pCO2: 37    /  pO2: 62    / HCO3: 25    / Base Excess: 1.6   /  SaO2: 92                    12-03 @ 07:01  -  12-04 @ 07:00  --------------------------------------------------------  IN: 2170 mL / OUT: 1800 mL / NET: 370 mL            PHYSICAL EXAM:    GENERAL: [x ]NAD, [ ]well-groomed, [ x]well-developed  HEAD:  [x ]Atraumatic, [ ]Normocephalic  EYES: [ x]EOMI, [ ]PERRLA, [x ]conjunctiva and sclera clear  ENMT: [ x]No tonsillar erythema, exudates, or enlargement; [ ]Moist mucous membranes, [ ]Good dentition, [ ]No lesions  NECK: [ x]Supple, normal appearance, [ ]No JVD; [ ]Normal thyroid; [ ]Trachea midline  NERVOUS SYSTEM:  [x ]Alert & Oriented X3, x[ ]Good concentration; [ ]Motor Strength 5/5 B/L upper and lower extremities; [ ]DTRs 2+ intact and symmetric  CHEST/LUNG: [ x]No chest deformity; [x ]Normal percussion bilaterally; [ ]No rales, rhonchi, wheezing   HEART: [ x]Regular rate and rhythm; [ ]No murmurs, rubs, or gallops  ABDOMEN: [x ]Soft, Nontender, Nondistended; [ ]Bowel sounds present  EXTREMITIES:  [x ]2+ Peripheral Pulses, [ ]No clubbing, cyanosis, or edema  LYMPH: [ x]No lymphadenopathy noted  SKIN: [x ]No rashes or lesions; [ ]Good capillary refill      LABS:  CBC Full  -  ( 04 Dec 2020 06:37 )  WBC Count : 15.00 K/uL  RBC Count : 4.33 M/uL  Hemoglobin : 13.7 g/dL  Hematocrit : 41.4 %  Platelet Count - Automated : 386 K/uL  Mean Cell Volume : 95.6 fl  Mean Cell Hemoglobin : 31.6 pg  Mean Cell Hemoglobin Concentration : 33.1 gm/dL  Auto Neutrophil # : 12.43 K/uL  Auto Lymphocyte # : 1.62 K/uL  Auto Monocyte # : 0.71 K/uL  Auto Eosinophil # : 0.00 K/uL  Auto Basophil # : 0.02 K/uL  Auto Neutrophil % : 82.9 %  Auto Lymphocyte % : 10.8 %  Auto Monocyte % : 4.7 %  Auto Eosinophil % : 0.0 %  Auto Basophil % : 0.1 %    12-04    136  |  103  |  17  ----------------------------<  127<H>  4.3   |  27  |  0.62    Ca    10.9<H>      04 Dec 2020 06:37  Phos  2.2     12-04  Mg     2.3     12-04    TPro  7.3  /  Alb  2.2<L>  /  TBili  0.2  /  DBili  x   /  AST  16  /  ALT  37  /  AlkPhos  62  12-04            RADIOLOGY & ADDITIONAL STUDIES REVIEWED:  yes    [ ]GOALS OF CARE DISCUSSION WITH PATIENT/FAMILY/PROXY:    CRITICAL CARE TIME SPENT: 35 minutes

## 2020-12-04 NOTE — PROGRESS NOTE ADULT - ASSESSMENT
ASSESSMENT AND PLAN:  59 yo F from home lives with son (who is autistic, covid +) PMHx of hypothyroid, sleep apnea, HTN PUD, cervical OA PSH tracheal resection and reconstruction, uterous myoma, presents to the ED c/o shortness of breath x 2 days. Patient underwent sleep studyfrom 11/20 to 11/21 ,diagnosed with DARRELL, spiked fever the day after 101.6 associated with headache, and later on cough and diarrhea and tested positive for COVID on 111/23rd. pt has been having fever and diarrhea since Dx. Pt notes onset of  worsening shortness of breath  for past 2 days.  Patient denies vomiting, rash , joint pain or any other acute complaints. Pt started ABx likley amoxicillin (does not remember te name ) and Decadron 6mg X5 days.    Of note : pt reports h/o massive GIB resulted in emergency intubation for 5 days. Pt reports her trachea was damaged as a result of intubation. s/p tracheal resection and reconstruction April 2016. She has undergone multiple surveillance bronchoscopies,    ED course : patient saturated on high 80s on RA on arrival , was placed on NRB and descalated to NC 4 liters, pt saturated 96% on 4L NC,  on my evaluation patient was saturating 97% on 2 L NC however tachypneic while talking or getting exited.  CXR : patchy basilar infiltrates   (01 Dec 2020 13:01)  Pt was saturating 93% on 100% NRB, transferred to ICU for high flow oxygen therapy.     Assessment:   Acute hypoxic respiratory failure due to COVID PNA   Sleep Apnea,   PUD, hypothyroidism     === Neuro===  Alert and oriented x 3    #Central sleep apnea?  Called her neurologist Dr. Greg Moreno [phone] for details, but no answer    === Cardiovascular===  HTN: not on any medication at home   - continue to monitor for now.     ===- Pulm===  Acute hypooxic respiratory failure due to COVID PNA   c/w Decadron, Remedesivir,   - s/p  Rocephin and Azith 12/1-3, Dcd as CXR consistent with Covid PNA  - On high flow 100% 55L, Increased to 100% , Patient's work of breathing is increasing   - Low threshold for Bipap right now  - ID Dr Nelson  - Lovenox DVT proph 40 mg BID switched to daily as D-Dimer is <150   - Follow inflammatory markers on alternate days     #sleep Apnea : pt recently had Sleep study done and diagnosed with Sleep apnea on NOv 20 but not started on CPAP yet   OP follow up     ====ID===  PNA: management as above     === Nephro===  no active issues     ===GI===  #Hx of PUD:   - c/w Protonix     === Heme===  #Leukocytosis   - likley due to covid infection  - monitor CBC    ===Endocrine===  #hypothyroidism   - TSH low 0.14  - f/u free T4 and total T3   c/w Levothyroxine     === Skin/Catheters===  No rashes. Peripheral IV lines.     ===Prophylaxis ===  LOVenox 40mg khan for DVT proph   Protonix for  GI proph    ===GOC===  DNR/DNI, discussed with pt and daughter over phone and MOLST form signed and placed in the chart.     ===Disposition===  Monitor in ICU   ASSESSMENT AND PLAN:  61 yo F from home lives with son (who is autistic, covid +) PMHx of hypothyroid, sleep apnea, HTN PUD, cervical OA PSH tracheal resection and reconstruction, uterous myoma, presents to the ED c/o shortness of breath x 2 days. Patient underwent sleep studyfrom 11/20 to 11/21 ,diagnosed with DARRELL, spiked fever the day after 101.6 associated with headache, and later on cough and diarrhea and tested positive for COVID on 111/23rd. pt has been having fever and diarrhea since Dx. Pt notes onset of  worsening shortness of breath  for past 2 days.  Patient denies vomiting, rash , joint pain or any other acute complaints. Pt started ABx likley amoxicillin (does not remember te name ) and Decadron 6mg X5 days.    Of note : pt reports h/o massive GIB resulted in emergency intubation for 5 days. Pt reports her trachea was damaged as a result of intubation. s/p tracheal resection and reconstruction April 2016. She has undergone multiple surveillance bronchoscopies,    ED course : patient saturated on high 80s on RA on arrival , was placed on NRB and descalated to NC 4 liters, pt saturated 96% on 4L NC,  on my evaluation patient was saturating 97% on 2 L NC however tachypneic while talking or getting exited.  CXR : patchy basilar infiltrates   (01 Dec 2020 13:01)  Pt was saturating 93% on 100% NRB, transferred to ICU for high flow oxygen therapy.     Assessment:   Acute hypoxic respiratory failure due to COVID PNA   Sleep Apnea,   PUD, hypothyroidism     === Neuro===  Alert and oriented x 3    #Central sleep apnea?  Called her neurologist Dr. Greg Moreno [phone] for details, but no answer    === Cardiovascular===  HTN: not on any medication at home   - continue to monitor for now.     ===- Pulm===  Acute hypooxic respiratory failure due to COVID PNA   c/w Decadron, Remedesivir,   - s/p  Rocephin and Azith 12/1-3, Dcd as CXR consistent with Covid PNA  - On high flow 100% 55L -->60L, Increased to 100% , Patient's work of breathing is increasing   - Low threshold for Bipap right now  - ID Dr Nelson  - Lovenox DVT proph 40 mg BID switched to daily as D-Dimer is <150   - Follow inflammatory markers on alternate days     #sleep Apnea : pt recently had Sleep study done and diagnosed with Sleep apnea on NOv 20 but not started on CPAP yet   OP follow up     ====ID===  PNA: management as above     === Nephro===  no active issues     ===GI===  #Hx of PUD:   - c/w Protonix     === Heme===  #Leukocytosis   - likley due to covid infection  - monitor CBC    ===Endocrine===  #hypothyroidism   - TSH low 0.14  - f/u free T4 and total T3   c/w Levothyroxine   - Elevated calcium, Get vitamin D, 1-25 BRIAN, PTH     === Skin/Catheters===  No rashes. Peripheral IV lines.     ===Prophylaxis ===  LOVenox 40mg daily for DVT proph   Protonix for  GI prophylaxis    ===GOC===  DNR/DNI, discussed with pt and daughter over phone and MOLST form signed and placed in the chart.     ===Disposition===  Monitor in ICU

## 2020-12-04 NOTE — PROGRESS NOTE ADULT - SUBJECTIVE AND OBJECTIVE BOX
ICU VISIT  60y Female    Meds:  remdesivir  IVPB   IV Intermittent   remdesivir  IVPB 100 milliGRAM(s) IV Intermittent every 24 hours    Allergies    No Known Allergies    Intolerances        VITALS:  Vital Signs Last 24 Hrs  T(C): 36 (04 Dec 2020 16:11), Max: 37 (04 Dec 2020 15:38)  T(F): 96.8 (04 Dec 2020 16:11), Max: 98.6 (04 Dec 2020 15:38)  HR: 78 (04 Dec 2020 16:38) (60 - 85)  BP: 133/75 (04 Dec 2020 16:26) (119/71 - 172/81)  BP(mean): 103 (04 Dec 2020 16:00) (78 - 142)  RR: 38 (04 Dec 2020 16:26) (16 - 43)  SpO2: 93% (04 Dec 2020 16:38) (83% - 94%)    LABS/DIAGNOSTIC TESTS:                          13.7   15.00 )-----------( 386      ( 04 Dec 2020 06:37 )             41.4         12-04    136  |  103  |  17  ----------------------------<  127<H>  4.3   |  27  |  0.62    Ca    10.9<H>      04 Dec 2020 06:37  Phos  2.2     12-04  Mg     2.3     12-04    TPro  7.3  /  Alb  2.2<L>  /  TBili  0.2  /  DBili  x   /  AST  16  /  ALT  37  /  AlkPhos  62  12-04      LIVER FUNCTIONS - ( 04 Dec 2020 06:37 )  Alb: 2.2 g/dL / Pro: 7.3 g/dL / ALK PHOS: 62 U/L / ALT: 37 U/L DA / AST: 16 U/L / GGT: x             CULTURES:       RADIOLOGY:< from: Xray Chest 1 View- PORTABLE-Routine (Xray Chest 1 View- PORTABLE-Routine in AM.) (12.04.20 @ 07:57) >  EXAM:  XR CHEST PORTABLE ROUTINE 1V                            PROCEDURE DATE:  12/04/2020          INTERPRETATION:  CLINICAL INDICATION: 60 years  Female with assessment, covid PNA.    COMPARISON: 12/2/2020    AP view of the chest demonstrates worsening bilateral interstitial and airspace infiltrates. There is no pleural effusion. There is no pneumothorax.    The heart, mediastinum and justin cannot be assessed due to projection. The pulmonary vasculature is normal.    Mild thoracic degenerativechanges are present.    IMPRESSION:    Worsening bilateral infiltrates.            EYAD DOMINGO MD; Attending Radiologist  This document has been electronically signed. Dec  4 2020  9:19AM    < end of copied text >        ROS:  [  ] UNABLE TO ELICIT ICU VISIT  60y Female who is still in the ICU, she remains on high flow oxygen, she has sob on exertion and has less coughing, no fevers or chills, c/o constipation and still has pleuritic chest pain.   She was given Convalescent plasma today as she was insisting on getting it. Her CXR shows increasing infiltrates and her WBC count increased a little further.     Meds:  remdesivir  IVPB   IV Intermittent   remdesivir  IVPB 100 milliGRAM(s) IV Intermittent every 24 hours    Allergies    No Known Allergies    Intolerances        VITALS:  Vital Signs Last 24 Hrs  T(C): 36 (04 Dec 2020 16:11), Max: 37 (04 Dec 2020 15:38)  T(F): 96.8 (04 Dec 2020 16:11), Max: 98.6 (04 Dec 2020 15:38)  HR: 78 (04 Dec 2020 16:38) (60 - 85)  BP: 133/75 (04 Dec 2020 16:26) (119/71 - 172/81)  BP(mean): 103 (04 Dec 2020 16:00) (78 - 142)  RR: 38 (04 Dec 2020 16:26) (16 - 43)  SpO2: 93% (04 Dec 2020 16:38) (83% - 94%)    LABS/DIAGNOSTIC TESTS:                          13.7   15.00 )-----------( 386      ( 04 Dec 2020 06:37 )             41.4         12-04    136  |  103  |  17  ----------------------------<  127<H>  4.3   |  27  |  0.62    Ca    10.9<H>      04 Dec 2020 06:37  Phos  2.2     12-04  Mg     2.3     12-04    TPro  7.3  /  Alb  2.2<L>  /  TBili  0.2  /  DBili  x   /  AST  16  /  ALT  37  /  AlkPhos  62  12-04      LIVER FUNCTIONS - ( 04 Dec 2020 06:37 )  Alb: 2.2 g/dL / Pro: 7.3 g/dL / ALK PHOS: 62 U/L / ALT: 37 U/L DA / AST: 16 U/L / GGT: x             CULTURES:       RADIOLOGY:< from: Xray Chest 1 View- PORTABLE-Routine (Xray Chest 1 View- PORTABLE-Routine in AM.) (12.04.20 @ 07:57) >  EXAM:  XR CHEST PORTABLE ROUTINE 1V                            PROCEDURE DATE:  12/04/2020          INTERPRETATION:  CLINICAL INDICATION: 60 years  Female with assessment, covid PNA.    COMPARISON: 12/2/2020    AP view of the chest demonstrates worsening bilateral interstitial and airspace infiltrates. There is no pleural effusion. There is no pneumothorax.    The heart, mediastinum and justin cannot be assessed due to projection. The pulmonary vasculature is normal.    Mild thoracic degenerativechanges are present.    IMPRESSION:    Worsening bilateral infiltrates.            EYAD DOMINGO MD; Attending Radiologist  This document has been electronically signed. Dec  4 2020  9:19AM    < end of copied text >        ROS:  [  ] UNABLE TO ELICIT

## 2020-12-04 NOTE — DIETITIAN INITIAL EVALUATION ADULT. - PERTINENT LABORATORY DATA
12-04 Na136 mmol/L Glu 127 mg/dL<H> K+ 4.3 mmol/L Cr  0.62 mg/dL BUN 17 mg/dL   12-04 Phos 2.2 mg/dL<L>   12-04 Alb 2.2 g/dL<L>     12-02 Chol 124 mg/dL LDL --    HDL 43 mg/dL<L> Trig 174 mg/dL<H>    12-02-20 @ 09:32 HgbA1C 5.8      Vitamin D, 25-Hydroxy: 41.0 ng/mL (12-04-20 @ 14:34)

## 2020-12-04 NOTE — PROGRESS NOTE ADULT - ASSESSMENT
COVID -19 infection /pneumonia  Hypoxia  Fevers - resolved  Leukocytosis - increased    Plan - Got Convalescent Plasma today.  Cont Decadron 6mgs iv q24hrs  Cont Remdesivir  100mgs iv q24 hrs x 3 days.  time spent - 30 mins.

## 2020-12-04 NOTE — PROGRESS NOTE ADULT - ASSESSMENT
1. PNA 2nd to Covid-19  - PCR positive. IGG Negative.   - CXR noted.  - Continue abx  - Continue Vit C, Vit D, Zinc   - Continue Dexamethasone   - Continue Remdesivir   - Continue Singulair and Pepcid   - Robitussin PRN for cough  - Tylenol PRN for temp   - O2 Supp - HFNC; low threshold for BIPAP.  - ICU management.   - Monitor O2 Sat.  - Monitor labs  - F.U CXR  - Prone positioning as tolerated   - DVT and GI PPX     2. DARRELL   - Newly diagnosed.   - Currently being treated for Covid-19.    3. Gastric Ulcer  - Stool Studies  - Monitor labs  - Continue meds  - GI eval.     4. Hx of Intubation   - S/P intubation x 5 days in 2016 for GIB  - Trachea was damaged during intubation.  - S/P tracheal resection and reconstruction.   - S/P surveillance bronchoscopies   - Request resuscitation measures if needed but wants to be DNI

## 2020-12-04 NOTE — PROGRESS NOTE ADULT - SUBJECTIVE AND OBJECTIVE BOX
Patient is a 60y old  Female who presents with a chief complaint of COVID PNA requiring oxygen supplementation (03 Dec 2020 18:47)    pt seen in icu [  ], reg med floor [ x  ], bed [ x ], chair at bedside [   ], a+o x3 [ x ], lethargic [  ],    nad [x  ]        Allergies    No Known Allergies        Vitals    T(F): 97.4 (12-04-20 @ 00:00), Max: 98.7 (12-03-20 @ 12:00)  HR: 67 (12-04-20 @ 06:00) (60 - 76)  BP: 132/77 (12-04-20 @ 06:00) (109/79 - 172/81)  RR: 22 (12-04-20 @ 06:00) (16 - 45)  SpO2: 93% (12-04-20 @ 06:00) (83% - 95%)  Wt(kg): --  CAPILLARY BLOOD GLUCOSE          Labs                          13.2   12.34 )-----------( 378      ( 03 Dec 2020 06:26 )             41.6       12-03    137  |  103  |  15  ----------------------------<  113<H>  4.3   |  25  |  0.55    Ca    10.8<H>      03 Dec 2020 06:26  Phos  2.5     12-03  Mg     2.1     12-03    TPro  7.7  /  Alb  2.4<L>  /  TBili  0.3  /  DBili  x   /  AST  31  /  ALT  46  /  AlkPhos  66  12-03      CARDIAC MARKERS ( 02 Dec 2020 07:11 )  x     / x     / 31 U/L / x     / x                Radiology Results      Meds    MEDICATIONS  (STANDING):  ALPRAZolam 2 milliGRAM(s) Oral at bedtime  chlorhexidine 2% Cloths 1 Application(s) Topical <User Schedule>  dexAMETHasone  Injectable 6 milliGRAM(s) IV Push daily  enoxaparin Injectable 40 milliGRAM(s) SubCutaneous daily  levothyroxine 100 MICROGram(s) Oral daily  pantoprazole    Tablet 40 milliGRAM(s) Oral before breakfast  polyethylene glycol 3350 17 Gram(s) Oral daily  remdesivir  IVPB   IV Intermittent   remdesivir  IVPB 100 milliGRAM(s) IV Intermittent every 24 hours  senna 2 Tablet(s) Oral at bedtime      MEDICATIONS  (PRN):  acetaminophen   Tablet .. 650 milliGRAM(s) Oral every 6 hours PRN Temp greater or equal to 38C (100.4F), Mild Pain (1 - 3)      Physical Exam    Neuro :  no focal deficits  Respiratory: CTA B/L  CV: RRR, S1S2, no murmurs,   Abdominal: Soft, NT, ND +BS,  Extremities: No edema, + peripheral pulses          ASSESSMENT    Infection due to severe acute respiratory syndrome coronavirus 2 (SARS-CoV-2)  pneumonia,   hypoxia,   h/o hypothyroid,   sleep apnea,   PUD,   cervical OA   tracheal resection and reconstruction,   uterus myoma,   HTN,   gi bleed  Osteoporosis  Hyperparathyroidism      PLAN    rocephin, zithromax,   id f/u  cont remdesivir  cont vit c, vitamin d, zinc, pepcid, singulair, decadron,   cont contact and airborne isolation,   cont albuterol inhaler,   cont tylenol prn, robitussin prn   pulm f/u   O2 sat (87% - 96%)   cont high flow O2 via nasal canula as needed,   pt still with low grade temp  dvt prophylaxis  covid-19 antibody test neg,   resp viral panel positive for covid 19,   procalcitonin, D-dimer wnl noted above,   esr, crp, ldh, ferritin elevated noted above,   f/u bld cx, ua, ucx,  gi cons.   f/u stool studies   cont current meds       Patient is a 60y old  Female who presents with a chief complaint of COVID PNA requiring oxygen supplementation (03 Dec 2020 18:47)    pt seen in icu [  ], reg med floor [ x  ], bed [ x ], chair at bedside [   ], a+o x3 [ x ], lethargic [  ],    nad [x  ]        Allergies    No Known Allergies        Vitals    T(F): 97.4 (12-04-20 @ 00:00), Max: 98.7 (12-03-20 @ 12:00)  HR: 67 (12-04-20 @ 06:00) (60 - 76)  BP: 132/77 (12-04-20 @ 06:00) (109/79 - 172/81)  RR: 22 (12-04-20 @ 06:00) (16 - 45)  SpO2: 93% (12-04-20 @ 06:00) (83% - 95%)  Wt(kg): --  CAPILLARY BLOOD GLUCOSE          Labs                          13.2   12.34 )-----------( 378      ( 03 Dec 2020 06:26 )             41.6       12-03    137  |  103  |  15  ----------------------------<  113<H>  4.3   |  25  |  0.55    Ca    10.8<H>      03 Dec 2020 06:26  Phos  2.5     12-03  Mg     2.1     12-03    TPro  7.7  /  Alb  2.4<L>  /  TBili  0.3  /  DBili  x   /  AST  31  /  ALT  46  /  AlkPhos  66  12-03      CARDIAC MARKERS ( 02 Dec 2020 07:11 )  x     / x     / 31 U/L / x     / x                Radiology Results      Meds    MEDICATIONS  (STANDING):  ALPRAZolam 2 milliGRAM(s) Oral at bedtime  chlorhexidine 2% Cloths 1 Application(s) Topical <User Schedule>  dexAMETHasone  Injectable 6 milliGRAM(s) IV Push daily  enoxaparin Injectable 40 milliGRAM(s) SubCutaneous daily  levothyroxine 100 MICROGram(s) Oral daily  pantoprazole    Tablet 40 milliGRAM(s) Oral before breakfast  polyethylene glycol 3350 17 Gram(s) Oral daily  remdesivir  IVPB   IV Intermittent   remdesivir  IVPB 100 milliGRAM(s) IV Intermittent every 24 hours  senna 2 Tablet(s) Oral at bedtime      MEDICATIONS  (PRN):  acetaminophen   Tablet .. 650 milliGRAM(s) Oral every 6 hours PRN Temp greater or equal to 38C (100.4F), Mild Pain (1 - 3)      Physical Exam    Neuro :  no focal deficits  Respiratory: CTA B/L  CV: RRR, S1S2, no murmurs,   Abdominal: Soft, NT, ND +BS,  Extremities: No edema, + peripheral pulses          ASSESSMENT    Infection due to severe acute respiratory syndrome coronavirus 2 (SARS-CoV-2)  pneumonia,   hypoxia,   h/o hypothyroid,   sleep apnea,   PUD,   cervical OA   tracheal resection and reconstruction,   uterus myoma,   HTN,   gi bleed  Osteoporosis  Hyperparathyroidism      PLAN    rocephin, zithromax,   id f/u  cont remdesivir  cont vit c, vitamin d, zinc, pepcid, singulair, decadron,   cont contact and airborne isolation,   cont albuterol inhaler,   cont tylenol prn, robitussin prn   pulm f/u   O2 sat (83% - 95%) on high flow   cont high flow O2 via nasal canula as needed,   pt afebrile  dvt prophylaxis  covid-19 antibody test neg,   resp viral panel positive for covid 19,   procalcitonin, D-dimer wnl noted     esr, crp, ldh, ferritin elevated noted    cont current meds

## 2020-12-05 NOTE — PROGRESS NOTE ADULT - SUBJECTIVE AND OBJECTIVE BOX
Patient is a 60y old  Female who presents with a chief complaint of COVID PNA requiring oxygen supplementation (05 Dec 2020 06:55)  Patient is awake, alert, laying in bed with high flow oxygen but in NAD. Still with Espinoza/sob and occasional cough    INTERVAL HPI/OVERNIGHT EVENTS:      VITAL SIGNS:  T(F): 97.9 (12-05-20 @ 07:05)  HR: 82 (12-05-20 @ 11:00)  BP: 131/76 (12-05-20 @ 10:00)  RR: 25 (12-05-20 @ 11:00)  SpO2: 98% (12-05-20 @ 11:00)  Wt(kg): --  I&O's Detail    04 Dec 2020 07:01  -  05 Dec 2020 07:00  --------------------------------------------------------  IN:    IV PiggyBack: 100 mL    Oral Fluid: 1500 mL  Total IN: 1600 mL    OUT:    Voided (mL): 2500 mL  Total OUT: 2500 mL    Total NET: -900 mL      05 Dec 2020 07:01  -  05 Dec 2020 12:35  --------------------------------------------------------  IN:    IV PiggyBack: 250 mL    Oral Fluid: 420 mL  Total IN: 670 mL    OUT:    Voided (mL): 500 mL  Total OUT: 500 mL    Total NET: 170 mL              REVIEW OF SYSTEMS:    CONSTITUTIONAL:  No fevers, chills, sweats    HEENT:  Eyes:  No diplopia or blurred vision. ENT:  No earache, sore throat or runny nose.    CARDIOVASCULAR:  No pressure, squeezing, tightness, or heaviness about the chest; no palpitations.    RESPIRATORY:  Per HPI    GASTROINTESTINAL:  No abdominal pain, nausea, vomiting or diarrhea.    GENITOURINARY:  No dysuria, frequency or urgency.    NEUROLOGIC:  No paresthesias, fasciculations, seizures or weakness.    PSYCHIATRIC:  No disorder of thought or mood.      PHYSICAL EXAM:    Constitutional: Well developed and nourished  Eyes:Perrla  ENMT: normal  Neck:supple  Respiratory: good air entry  Cardiovascular: S1 S2 regular  Gastrointestinal: Soft, Non tender  Extremities: No edema  Vascular:normal  Neurological:Awake, alert,Ox3  Musculoskeletal:Normal      MEDICATIONS  (STANDING):  ALBUTerol    90 MICROgram(s) HFA Inhaler 2 Puff(s) Inhalation every 6 hours  ALPRAZolam 2 milliGRAM(s) Oral at bedtime  chlorhexidine 2% Cloths 1 Application(s) Topical <User Schedule>  dexAMETHasone  Injectable 6 milliGRAM(s) IV Push daily  enoxaparin Injectable 40 milliGRAM(s) SubCutaneous daily  levothyroxine 100 MICROGram(s) Oral daily  pantoprazole    Tablet 40 milliGRAM(s) Oral before breakfast  polyethylene glycol 3350 17 Gram(s) Oral daily  remdesivir  IVPB   IV Intermittent   remdesivir  IVPB 100 milliGRAM(s) IV Intermittent every 24 hours  senna 2 Tablet(s) Oral at bedtime    MEDICATIONS  (PRN):  acetaminophen   Tablet .. 650 milliGRAM(s) Oral every 6 hours PRN Temp greater or equal to 38C (100.4F), Mild Pain (1 - 3)  morphine  - Injectable 1 milliGRAM(s) IV Push every 8 hours PRN Respiratory distress      Allergies    No Known Allergies    Intolerances        LABS:                        14.3   19.84 )-----------( 406      ( 05 Dec 2020 06:36 )             44.4     12-05    136  |  104  |  14  ----------------------------<  159<H>  4.6   |  26  |  0.56    Ca    11.0<H>      05 Dec 2020 06:36  Phos  2.0     12-05  Mg     2.2     12-05    TPro  7.7  /  Alb  2.3<L>  /  TBili  0.2  /  DBili  x   /  AST  23  /  ALT  35  /  AlkPhos  68  12-05              CAPILLARY BLOOD GLUCOSE        pro-bnp -- 12-01 @ 11:26     d-dimer <150  12-01 @ 11:26      RADIOLOGY & ADDITIONAL TESTS:    CXR:  < from: Xray Chest 1 View- PORTABLE-Routine (Xray Chest 1 View- PORTABLE-Routine in AM.) (12.05.20 @ 10:43) >  IMPRESSION: No interval change . Diffuse bilateral perihilar peripheral airspace disease..    < end of copied text >    Ct scan chest:    ekg;    echo:

## 2020-12-05 NOTE — PROGRESS NOTE ADULT - ASSESSMENT
1. PNA 2nd to Covid-19  - PCR positive. IGG Negative.   - CXR noted.  - Continue abx  - Continue Vit C, Vit D, Zinc   - Continue Dexamethasone   - Continue Remdesivir   - Continue Singulair and Pepcid   - Robitussin PRN for cough  - Tylenol PRN for temp   - O2 Supp - HFNC; low threshold for BIPAP.  - ICU management.   - Monitor O2 Sat.  - Monitor LFTs, LDH, D-Dimer, Ferritin, CRP and procalcitonin  - F.U CXR  - Prone positioning as tolerated   - DVT and GI PPX     2. DARRELL   - Newly diagnosed.   - Currently being treated for Covid-19.    3. Gastric Ulcer  - Stool Studies  - Monitor labs  - Continue meds  - GI eval.     4. Hx of Intubation   - S/P intubation x 5 days in 2016 for GIB  - Trachea was damaged during intubation.  - S/P tracheal resection and reconstruction.   - S/P surveillance bronchoscopies   - Request resuscitation measures if needed but wants to be DNI

## 2020-12-05 NOTE — PROGRESS NOTE ADULT - ASSESSMENT
COVID -19 infection /pneumonia  Hypoxia  Fevers - resolved  Leukocytosis     Plan - Got Convalescent Plasma   Cont Decadron 6mgs iv q24hrs  Cont Remdesivir  100mgs iv q24hrs to complete course  time spent - 30 mins.

## 2020-12-05 NOTE — PROGRESS NOTE ADULT - SUBJECTIVE AND OBJECTIVE BOX
Patient is a 60y old  Female who presents with a chief complaint of COVID PNA requiring oxygen supplementation (04 Dec 2020 17:04)    INTERVAL HPI/OVERNIGHT EVENTS: High flow 100%, 55L, desat mid-80s, brief trial +NRB, SpO2 86, placed on BiPAP 12/100/7, improved. Received convalescent plasma per request. Continuing to follow up hypercalcemia workup.     ICU Vital Signs Last 24 Hrs  T(C): 36.5 (04 Dec 2020 21:00), Max: 37 (04 Dec 2020 15:38)  T(F): 97.7 (04 Dec 2020 21:00), Max: 98.6 (04 Dec 2020 15:38)  HR: 64 (05 Dec 2020 02:00) (63 - 85)  BP: 126/77 (05 Dec 2020 02:00) (120/80 - 159/85)  BP(mean): 90 (05 Dec 2020 02:00) (75 - 142)  ABP: --  ABP(mean): --  RR: 38 (05 Dec 2020 02:00) (16 - 47)  SpO2: 90% (05 Dec 2020 02:00) (83% - 94%)    I&O's Summary    03 Dec 2020 07:01  -  04 Dec 2020 07:00  --------------------------------------------------------  IN: 2170 mL / OUT: 1800 mL / NET: 370 mL    04 Dec 2020 07:01  -  05 Dec 2020 02:39  --------------------------------------------------------  IN: 1500 mL / OUT: 2000 mL / NET: -500 mL          LABS:                        13.7   15.00 )-----------( 386      ( 04 Dec 2020 06:37 )             41.4     12-04    136  |  103  |  17  ----------------------------<  127<H>  4.3   |  27  |  0.62    Ca    10.9<H>      04 Dec 2020 06:37  Phos  2.2     12-04  Mg     2.3     12-04    TPro  7.3  /  Alb  2.2<L>  /  TBili  0.2  /  DBili  x   /  AST  16  /  ALT  37  /  AlkPhos  62  12-04        CAPILLARY BLOOD GLUCOSE            RADIOLOGY & ADDITIONAL TESTS:    Consultant(s) Notes Reviewed:  [x ] YES  [ ] NO    MEDICATIONS  (STANDING):  ALBUTerol    90 MICROgram(s) HFA Inhaler 2 Puff(s) Inhalation every 6 hours  ALPRAZolam 2 milliGRAM(s) Oral at bedtime  chlorhexidine 2% Cloths 1 Application(s) Topical <User Schedule>  dexAMETHasone  Injectable 6 milliGRAM(s) IV Push daily  enoxaparin Injectable 40 milliGRAM(s) SubCutaneous daily  levothyroxine 100 MICROGram(s) Oral daily  pantoprazole    Tablet 40 milliGRAM(s) Oral before breakfast  polyethylene glycol 3350 17 Gram(s) Oral daily  remdesivir  IVPB   IV Intermittent   remdesivir  IVPB 100 milliGRAM(s) IV Intermittent every 24 hours  senna 2 Tablet(s) Oral at bedtime    MEDICATIONS  (PRN):  acetaminophen   Tablet .. 650 milliGRAM(s) Oral every 6 hours PRN Temp greater or equal to 38C (100.4F), Mild Pain (1 - 3)      PHYSICAL EXAM:    GENERAL: [x ]NAD, [ ]well-groomed, [ x]well-developed  HEAD:  [x ]Atraumatic, [ ]Normocephalic  EYES: [ x]EOMI, [ ]PERRLA, [x ]conjunctiva and sclera clear  ENMT: [ x]No tonsillar erythema, exudates, or enlargement; [ ]Moist mucous membranes, [ ]Good dentition, [ ]No lesions  NECK: [ x]Supple, normal appearance, [ ]No JVD; [ ]Normal thyroid; [ ]Trachea midline  NERVOUS SYSTEM:  [x ]Alert & Oriented X3, x[ ]Good concentration; [ ]Motor Strength 5/5 B/L upper and lower extremities; [ ]DTRs 2+ intact and symmetric  CHEST/LUNG: [ x]No chest deformity; [x ]Normal percussion bilaterally; [ ]No rales, rhonchi, wheezing   HEART: [ x]Regular rate and rhythm; [ ]No murmurs, rubs, or gallops  ABDOMEN: [x ]Soft, Nontender, Nondistended; [ ]Bowel sounds present  EXTREMITIES:  [x ]2+ Peripheral Pulses, [ ]No clubbing, cyanosis, or edema  LYMPH: [ x]No lymphadenopathy noted  SKIN: [x ]No rashes or lesions; [ ]Good capillary refill    Care Discussed with Consultants/Other Providers [ x] YES  [ ] NO

## 2020-12-05 NOTE — PROGRESS NOTE ADULT - SUBJECTIVE AND OBJECTIVE BOX
ICU VISIT  60y Female    Meds:  remdesivir  IVPB   IV Intermittent   remdesivir  IVPB 100 milliGRAM(s) IV Intermittent every 24 hours    Allergies    No Known Allergies    Intolerances        VITALS:  Vital Signs Last 24 Hrs  T(C): 36.6 (05 Dec 2020 07:05), Max: 36.6 (05 Dec 2020 07:05)  T(F): 97.9 (05 Dec 2020 07:05), Max: 97.9 (05 Dec 2020 07:05)  HR: 78 (05 Dec 2020 17:00) (64 - 86)  BP: 118/81 (05 Dec 2020 17:00) (118/81 - 158/79)  BP(mean): 89 (05 Dec 2020 17:00) (84 - 106)  RR: 42 (05 Dec 2020 17:00) (18 - 54)  SpO2: 92% (05 Dec 2020 17:00) (84% - 98%)    LABS/DIAGNOSTIC TESTS:                          14.3   19.84 )-----------( 406      ( 05 Dec 2020 06:36 )             44.4         12-05    136  |  104  |  14  ----------------------------<  159<H>  4.6   |  26  |  0.56    Ca    11.0<H>      05 Dec 2020 06:36  Phos  2.0     12-05  Mg     2.2     12-05    TPro  7.7  /  Alb  2.3<L>  /  TBili  0.2  /  DBili  x   /  AST  23  /  ALT  35  /  AlkPhos  68  12-05      LIVER FUNCTIONS - ( 05 Dec 2020 06:36 )  Alb: 2.3 g/dL / Pro: 7.7 g/dL / ALK PHOS: 68 U/L / ALT: 35 U/L DA / AST: 23 U/L / GGT: x             CULTURES:       RADIOLOGY:< from: Xray Chest 1 View- PORTABLE-Routine (Xray Chest 1 View- PORTABLE-Routine in AM.) (12.05.20 @ 10:43) >  EXAM:  XR CHEST PORTABLE ROUTINE 1V                            PROCEDURE DATE:  12/05/2020          INTERPRETATION:  Portable chest radiograph    CLINICAL INFORMATION: Pneumonia due to Covid 19.    TECHNIQUE:  Portable  AP view of the chest was obtained.    COMPARISON: 12/4/2020 available for review.    FINDINGS:  The lungs show persistent  diffuse perihilar basilar airspace disease.    The heart and mediastinum are within normal limits.    Visualized osseous structures are intact.        IMPRESSION: No interval change . Diffuse bilateral perihilar peripheral airspace disease..              CHANTAL MENDOSA MD; Attending Radiologist  This document has been electronically signed. Dec  5 2020 12:23PM    < end of copied text >        ROS:  [  ] UNABLE TO ELICIT ICU VISIT  60y Female who remains in the ICU , she states she is not doing as well today, she has been cut down to 25liters on her high flow oxygen and is saturating at 90-97%. She has no fevers , no diarrhea, no chest pain, she is is still on Remdesivir.    Meds:  remdesivir  IVPB   IV Intermittent   remdesivir  IVPB 100 milliGRAM(s) IV Intermittent every 24 hours    Allergies    No Known Allergies    Intolerances        VITALS:  Vital Signs Last 24 Hrs  T(C): 36.6 (05 Dec 2020 07:05), Max: 36.6 (05 Dec 2020 07:05)  T(F): 97.9 (05 Dec 2020 07:05), Max: 97.9 (05 Dec 2020 07:05)  HR: 78 (05 Dec 2020 17:00) (64 - 86)  BP: 118/81 (05 Dec 2020 17:00) (118/81 - 158/79)  BP(mean): 89 (05 Dec 2020 17:00) (84 - 106)  RR: 42 (05 Dec 2020 17:00) (18 - 54)  SpO2: 92% (05 Dec 2020 17:00) (84% - 98%)    LABS/DIAGNOSTIC TESTS:                          14.3   19.84 )-----------( 406      ( 05 Dec 2020 06:36 )             44.4         12-05    136  |  104  |  14  ----------------------------<  159<H>  4.6   |  26  |  0.56    Ca    11.0<H>      05 Dec 2020 06:36  Phos  2.0     12-05  Mg     2.2     12-05    TPro  7.7  /  Alb  2.3<L>  /  TBili  0.2  /  DBili  x   /  AST  23  /  ALT  35  /  AlkPhos  68  12-05      LIVER FUNCTIONS - ( 05 Dec 2020 06:36 )  Alb: 2.3 g/dL / Pro: 7.7 g/dL / ALK PHOS: 68 U/L / ALT: 35 U/L DA / AST: 23 U/L / GGT: x             CULTURES:       RADIOLOGY:< from: Xray Chest 1 View- PORTABLE-Routine (Xray Chest 1 View- PORTABLE-Routine in AM.) (12.05.20 @ 10:43) >  EXAM:  XR CHEST PORTABLE ROUTINE 1V                            PROCEDURE DATE:  12/05/2020          INTERPRETATION:  Portable chest radiograph    CLINICAL INFORMATION: Pneumonia due to Covid 19.    TECHNIQUE:  Portable  AP view of the chest was obtained.    COMPARISON: 12/4/2020 available for review.    FINDINGS:  The lungs show persistent  diffuse perihilar basilar airspace disease.    The heart and mediastinum are within normal limits.    Visualized osseous structures are intact.        IMPRESSION: No interval change . Diffuse bilateral perihilar peripheral airspace disease..              CHANTAL MENDOSA MD; Attending Radiologist  This document has been electronically signed. Dec  5 2020 12:23PM    < end of copied text >        ROS:  [  ] UNABLE TO ELICIT

## 2020-12-05 NOTE — PROGRESS NOTE ADULT - SUBJECTIVE AND OBJECTIVE BOX
Patient is a 60y old  Female who presents with a chief complaint of COVID PNA requiring oxygen supplementation (05 Dec 2020 02:38)      pt seen in icu [  ], reg med floor [ x  ], bed [ x ], chair at bedside [   ], a+o x3 [ x ], lethargic [  ],    nad [x  ]        Allergies    No Known Allergies        Vitals    T(F): 97.7 (12-04-20 @ 21:00), Max: 98.6 (12-04-20 @ 15:38)  HR: 76 (12-05-20 @ 06:00) (63 - 85)  BP: 137/84 (12-05-20 @ 06:00) (120/80 - 159/85)  RR: 34 (12-05-20 @ 06:00) (16 - 47)  SpO2: 91% (12-05-20 @ 06:00) (87% - 94%)  Wt(kg): --  CAPILLARY BLOOD GLUCOSE          Labs                          14.3   x     )-----------( 406      ( 05 Dec 2020 06:36 )             44.4       12-04    136  |  103  |  17  ----------------------------<  127<H>  4.3   |  27  |  0.62    Ca    10.9<H>      04 Dec 2020 06:37  Phos  2.2     12-04  Mg     2.3     12-04    TPro  7.3  /  Alb  2.2<L>  /  TBili  0.2  /  DBili  x   /  AST  16  /  ALT  37  /  AlkPhos  62  12-04                Radiology Results      Meds    MEDICATIONS  (STANDING):  ALBUTerol    90 MICROgram(s) HFA Inhaler 2 Puff(s) Inhalation every 6 hours  ALPRAZolam 2 milliGRAM(s) Oral at bedtime  chlorhexidine 2% Cloths 1 Application(s) Topical <User Schedule>  dexAMETHasone  Injectable 6 milliGRAM(s) IV Push daily  enoxaparin Injectable 40 milliGRAM(s) SubCutaneous daily  levothyroxine 100 MICROGram(s) Oral daily  pantoprazole    Tablet 40 milliGRAM(s) Oral before breakfast  polyethylene glycol 3350 17 Gram(s) Oral daily  remdesivir  IVPB   IV Intermittent   remdesivir  IVPB 100 milliGRAM(s) IV Intermittent every 24 hours  senna 2 Tablet(s) Oral at bedtime      MEDICATIONS  (PRN):  acetaminophen   Tablet .. 650 milliGRAM(s) Oral every 6 hours PRN Temp greater or equal to 38C (100.4F), Mild Pain (1 - 3)      Physical Exam    Neuro :  no focal deficits  Respiratory: CTA B/L  CV: RRR, S1S2, no murmurs,   Abdominal: Soft, NT, ND +BS,  Extremities: No edema, + peripheral pulses          ASSESSMENT    Infection due to severe acute respiratory syndrome coronavirus 2 (SARS-CoV-2)  pneumonia,   hypoxia,   h/o hypothyroid,   sleep apnea,   PUD,   cervical OA   tracheal resection and reconstruction,   uterus myoma,   HTN,   gi bleed  Osteoporosis  Hyperparathyroidism      PLAN    rocephin, zithromax,   id f/u  cont remdesivir  cont vit c, vitamin d, zinc, pepcid, singulair, decadron,   cont contact and airborne isolation,   cont albuterol inhaler,   cont tylenol prn, robitussin prn   pulm f/u   O2 sat (83% - 95%) on high flow   cont high flow O2 via nasal canula as needed,   pt afebrile  dvt prophylaxis  covid-19 antibody test neg,   resp viral panel positive for covid 19,   procalcitonin, D-dimer wnl noted     esr, crp, ldh, ferritin elevated noted    cont current meds           Patient is a 60y old  Female who presents with a chief complaint of COVID PNA requiring oxygen supplementation (05 Dec 2020 02:38)      pt seen in icu [  ], reg med floor [ x  ], bed [ x ], chair at bedside [   ], a+o x3 [ x ], lethargic [  ],    nad [x  ]        Allergies    No Known Allergies        Vitals    T(F): 97.7 (12-04-20 @ 21:00), Max: 98.6 (12-04-20 @ 15:38)  HR: 76 (12-05-20 @ 06:00) (63 - 85)  BP: 137/84 (12-05-20 @ 06:00) (120/80 - 159/85)  RR: 34 (12-05-20 @ 06:00) (16 - 47)  SpO2: 91% (12-05-20 @ 06:00) (87% - 94%)  Wt(kg): --  CAPILLARY BLOOD GLUCOSE          Labs                          14.3   x     )-----------( 406      ( 05 Dec 2020 06:36 )             44.4       12-04    136  |  103  |  17  ----------------------------<  127<H>  4.3   |  27  |  0.62    Ca    10.9<H>      04 Dec 2020 06:37  Phos  2.2     12-04  Mg     2.3     12-04    TPro  7.3  /  Alb  2.2<L>  /  TBili  0.2  /  DBili  x   /  AST  16  /  ALT  37  /  AlkPhos  62  12-04                Radiology Results      Meds    MEDICATIONS  (STANDING):  ALBUTerol    90 MICROgram(s) HFA Inhaler 2 Puff(s) Inhalation every 6 hours  ALPRAZolam 2 milliGRAM(s) Oral at bedtime  chlorhexidine 2% Cloths 1 Application(s) Topical <User Schedule>  dexAMETHasone  Injectable 6 milliGRAM(s) IV Push daily  enoxaparin Injectable 40 milliGRAM(s) SubCutaneous daily  levothyroxine 100 MICROGram(s) Oral daily  pantoprazole    Tablet 40 milliGRAM(s) Oral before breakfast  polyethylene glycol 3350 17 Gram(s) Oral daily  remdesivir  IVPB   IV Intermittent   remdesivir  IVPB 100 milliGRAM(s) IV Intermittent every 24 hours  senna 2 Tablet(s) Oral at bedtime      MEDICATIONS  (PRN):  acetaminophen   Tablet .. 650 milliGRAM(s) Oral every 6 hours PRN Temp greater or equal to 38C (100.4F), Mild Pain (1 - 3)      Physical Exam    Neuro :  no focal deficits  Respiratory: CTA B/L  CV: RRR, S1S2, no murmurs,   Abdominal: Soft, NT, ND +BS,  Extremities: No edema, + peripheral pulses          ASSESSMENT    Infection due to severe acute respiratory syndrome coronavirus 2 (SARS-CoV-2)  pneumonia,   hypoxia,   h/o hypothyroid,   sleep apnea,   PUD,   cervical OA   tracheal resection and reconstruction,   uterus myoma,   HTN,   gi bleed  Osteoporosis  Hyperparathyroidism      PLAN    id f/u   Cont Decadron 6mgs iv q24hrs  Cont Remdesivir  100mgs iv q24 hrs x 3 days.  s/p convalescent plasma  cont decadron,   cont contact and airborne isolation,   cont albuterol inhaler,   cont tylenol prn, robitussin prn   pulm f/u   O2 sat  (87% - 94%) on high flow   cont high flow O2 via nasal canula as needed,   pt afebrile  dvt prophylaxis  covid-19 antibody test neg,   resp viral panel positive for covid 19,   procalcitonin, D-dimer wnl noted     esr, crp, ldh, ferritin elevated noted    cont current meds  mgmt as per icu

## 2020-12-06 NOTE — PROGRESS NOTE ADULT - ASSESSMENT
COVID -19 infection /pneumonia  Hypoxia  Fevers - resolved  Leukocytosis   Transaminitis - likely from Remdesivir, will monitor    Plan - Got Convalescent Plasma   Cont Decadron 6mgs iv q24hrs  Completed  Remdesivir    time spent - 30 mins.

## 2020-12-06 NOTE — PROGRESS NOTE ADULT - SUBJECTIVE AND OBJECTIVE BOX
Patient is a 60y old  Female who presents with a chief complaint of COVID PNA requiring oxygen supplementation (06 Dec 2020 00:06)    pt seen in icu [  ], reg med floor [ x  ], bed [ x ], chair at bedside [   ], a+o x3 [ x ], lethargic [  ],    nad [x  ]        Allergies    No Known Allergies        Vitals    T(F): 96.8 (12-06-20 @ 04:00), Max: 97.9 (12-05-20 @ 07:05)  HR: 73 (12-06-20 @ 05:00) (67 - 87)  BP: 127/80 (12-06-20 @ 05:00) (118/81 - 159/81)  RR: 37 (12-06-20 @ 05:00) (19 - 54)  SpO2: 90% (12-06-20 @ 05:00) (84% - 98%)  Wt(kg): --  CAPILLARY BLOOD GLUCOSE          Labs                          14.3   19.84 )-----------( 406      ( 05 Dec 2020 06:36 )             44.4       12-05    136  |  104  |  14  ----------------------------<  159<H>  4.6   |  26  |  0.56    Ca    11.0<H>      05 Dec 2020 06:36  Phos  2.0     12-05  Mg     2.2     12-05    TPro  7.7  /  Alb  2.3<L>  /  TBili  0.2  /  DBili  x   /  AST  23  /  ALT  35  /  AlkPhos  68  12-05                Radiology Results      Meds    MEDICATIONS  (STANDING):  ALBUTerol    90 MICROgram(s) HFA Inhaler 2 Puff(s) Inhalation every 6 hours  ALPRAZolam 2 milliGRAM(s) Oral at bedtime  chlorhexidine 2% Cloths 1 Application(s) Topical <User Schedule>  enoxaparin Injectable 40 milliGRAM(s) SubCutaneous daily  levothyroxine 100 MICROGram(s) Oral daily  pantoprazole    Tablet 40 milliGRAM(s) Oral before breakfast  polyethylene glycol 3350 17 Gram(s) Oral daily  remdesivir  IVPB   IV Intermittent   remdesivir  IVPB 100 milliGRAM(s) IV Intermittent every 24 hours  senna 2 Tablet(s) Oral at bedtime      MEDICATIONS  (PRN):  acetaminophen   Tablet .. 650 milliGRAM(s) Oral every 6 hours PRN Temp greater or equal to 38C (100.4F), Mild Pain (1 - 3)  morphine  - Injectable 1 milliGRAM(s) IV Push every 8 hours PRN Respiratory distress      Physical Exam    Neuro :  no focal deficits  Respiratory: CTA B/L  CV: RRR, S1S2, no murmurs,   Abdominal: Soft, NT, ND +BS,  Extremities: No edema, + peripheral pulses          ASSESSMENT    Infection due to severe acute respiratory syndrome coronavirus 2 (SARS-CoV-2)  pneumonia,   hypoxia,   h/o hypothyroid,   sleep apnea,   PUD,   cervical OA   tracheal resection and reconstruction,   uterus myoma,   HTN,   gi bleed  Osteoporosis  Hyperparathyroidism      PLAN    id f/u   Cont Decadron 6mgs iv q24hrs  Cont Remdesivir  100mgs iv q24 hrs x 3 days.  s/p convalescent plasma  cont decadron,   cont contact and airborne isolation,   cont albuterol inhaler,   cont tylenol prn, robitussin prn   pulm f/u   O2 sat  (87% - 94%) on high flow   cont high flow O2 via nasal canula as needed,   pt afebrile  dvt prophylaxis  covid-19 antibody test neg,   resp viral panel positive for covid 19,   procalcitonin, D-dimer wnl noted     esr, crp, ldh, ferritin elevated noted    cont current meds  mgmt as per icu     Patient is a 60y old  Female who presents with a chief complaint of COVID PNA requiring oxygen supplementation (06 Dec 2020 00:06)    pt seen in icu [  ], reg med floor [ x  ], bed [ x ], chair at bedside [   ], a+o x3 [ x ], lethargic [  ],    nad [x  ]        Allergies    No Known Allergies        Vitals    T(F): 96.8 (12-06-20 @ 04:00), Max: 97.9 (12-05-20 @ 07:05)  HR: 73 (12-06-20 @ 05:00) (67 - 87)  BP: 127/80 (12-06-20 @ 05:00) (118/81 - 159/81)  RR: 37 (12-06-20 @ 05:00) (19 - 54)  SpO2: 90% (12-06-20 @ 05:00) (84% - 98%)  Wt(kg): --  CAPILLARY BLOOD GLUCOSE          Labs                          14.3   19.84 )-----------( 406      ( 05 Dec 2020 06:36 )             44.4       12-05    136  |  104  |  14  ----------------------------<  159<H>  4.6   |  26  |  0.56    Ca    11.0<H>      05 Dec 2020 06:36  Phos  2.0     12-05  Mg     2.2     12-05    TPro  7.7  /  Alb  2.3<L>  /  TBili  0.2  /  DBili  x   /  AST  23  /  ALT  35  /  AlkPhos  68  12-05                Radiology Results      Meds    MEDICATIONS  (STANDING):  ALBUTerol    90 MICROgram(s) HFA Inhaler 2 Puff(s) Inhalation every 6 hours  ALPRAZolam 2 milliGRAM(s) Oral at bedtime  chlorhexidine 2% Cloths 1 Application(s) Topical <User Schedule>  enoxaparin Injectable 40 milliGRAM(s) SubCutaneous daily  levothyroxine 100 MICROGram(s) Oral daily  pantoprazole    Tablet 40 milliGRAM(s) Oral before breakfast  polyethylene glycol 3350 17 Gram(s) Oral daily  remdesivir  IVPB   IV Intermittent   remdesivir  IVPB 100 milliGRAM(s) IV Intermittent every 24 hours  senna 2 Tablet(s) Oral at bedtime      MEDICATIONS  (PRN):  acetaminophen   Tablet .. 650 milliGRAM(s) Oral every 6 hours PRN Temp greater or equal to 38C (100.4F), Mild Pain (1 - 3)  morphine  - Injectable 1 milliGRAM(s) IV Push every 8 hours PRN Respiratory distress      Physical Exam    Neuro :  no focal deficits  Respiratory: CTA B/L  CV: RRR, S1S2, no murmurs,   Abdominal: Soft, NT, ND +BS,  Extremities: No edema, + peripheral pulses          ASSESSMENT    Infection due to severe acute respiratory syndrome coronavirus 2 (SARS-CoV-2)  pneumonia,   hypoxia,   h/o hypothyroid,   sleep apnea,   PUD,   cervical OA   tracheal resection and reconstruction,   uterus myoma,   HTN,   gi bleed  Osteoporosis  Hyperparathyroidism      PLAN    id f/u   Cont Decadron 6mgs iv q24hrs  Cont Remdesivir  100mgs iv q24 hrs last dose today.  s/p convalescent plasma  cont decadron,   cont contact and airborne isolation,   cont albuterol inhaler,   cont tylenol prn, robitussin prn   pulm f/u   O2 sat (84% - 98%) on high flow   cont high flow O2 via nasal canula as needed,   pt afebrile  dvt prophylaxis  covid-19 antibody test neg,   resp viral panel positive for covid 19,   procalcitonin, D-dimer wnl noted     esr, crp, ldh, ferritin elevated noted    cont current meds  mgmt as per icu

## 2020-12-06 NOTE — PROGRESS NOTE ADULT - SUBJECTIVE AND OBJECTIVE BOX
INTERVAL HPI/OVERNIGHT EVENTS: Patient has been anxious and requesting for morphine and Xanax, Later she desaturated to 80's with tachypnea around 11.30 pm and was palced on BIPAP .     PRESSORS: [ ] YES [ x] NO  WHICH:    ANTIBIOTICS:                  DATE STARTED:  ANTIBIOTICS:                  DATE STARTED:  ANTIBIOTICS:                  DATE STARTED:    Antimicrobial:  remdesivir  IVPB   IV Intermittent   remdesivir  IVPB 100 milliGRAM(s) IV Intermittent every 24 hours    Cardiovascular:    Pulmonary:  ALBUTerol    90 MICROgram(s) HFA Inhaler 2 Puff(s) Inhalation every 6 hours    Hematalogic:  enoxaparin Injectable 40 milliGRAM(s) SubCutaneous daily    Other:  acetaminophen   Tablet .. 650 milliGRAM(s) Oral every 6 hours PRN  ALPRAZolam 2 milliGRAM(s) Oral at bedtime  chlorhexidine 2% Cloths 1 Application(s) Topical <User Schedule>  dexAMETHasone  Injectable 6 milliGRAM(s) IV Push daily  levothyroxine 100 MICROGram(s) Oral daily  morphine  - Injectable 1 milliGRAM(s) IV Push every 8 hours PRN  pantoprazole    Tablet 40 milliGRAM(s) Oral before breakfast  polyethylene glycol 3350 17 Gram(s) Oral daily  senna 2 Tablet(s) Oral at bedtime    acetaminophen   Tablet .. 650 milliGRAM(s) Oral every 6 hours PRN  ALBUTerol    90 MICROgram(s) HFA Inhaler 2 Puff(s) Inhalation every 6 hours  ALPRAZolam 2 milliGRAM(s) Oral at bedtime  chlorhexidine 2% Cloths 1 Application(s) Topical <User Schedule>  dexAMETHasone  Injectable 6 milliGRAM(s) IV Push daily  enoxaparin Injectable 40 milliGRAM(s) SubCutaneous daily  levothyroxine 100 MICROGram(s) Oral daily  morphine  - Injectable 1 milliGRAM(s) IV Push every 8 hours PRN  pantoprazole    Tablet 40 milliGRAM(s) Oral before breakfast  polyethylene glycol 3350 17 Gram(s) Oral daily  remdesivir  IVPB   IV Intermittent   remdesivir  IVPB 100 milliGRAM(s) IV Intermittent every 24 hours  senna 2 Tablet(s) Oral at bedtime    Drug Dosing Weight  Height (cm): 162.6 (01 Dec 2020 09:53)  Weight (kg): 85.7 (01 Dec 2020 09:53)  BMI (kg/m2): 32.4 (01 Dec 2020 09:53)  BSA (m2): 1.91 (01 Dec 2020 09:53)    CENTRAL LINE: [ ] YES [x ] NO  LOCATION:   DATE INSERTED:  REMOVE: [ ] YES [ ] NO  EXPLAIN:    DONALD: [ ] YES [ x] NO    DATE INSERTED:  REMOVE:  [ ] YES [ ] NO  EXPLAIN:    A-LINE:  [ ] YES [x ] NO  LOCATION:   DATE INSERTED:  REMOVE:  [ ] YES [ ] NO  EXPLAIN:    PMH -reviewed admission note, no change since admission  PAST MEDICAL & SURGICAL HISTORY:  Lumbar back pain    Osteoporosis    History of tracheal stenosis    Other specified diseases of upper respiratory tract    Hypothyroidism    Kidney stones    Gallstones    Fatty liver    Thyroid disease    Obesity    Constipation    Gastric ulcer  February 2016- h/o GI bleed which patient had to be intubated Jan 2016    Arthritis of shoulder region, right    Arthritis  right shoulder    S/P bronchoscopy  2017    S/P bronchoscopy  5/16    Hyperparathyroidism  2012 parathyroidectomy    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6   /  SaO2: 92                    12-04 @ 07:01  -  12-05 @ 07:00  --------------------------------------------------------  IN: 1600 mL / OUT: 2500 mL / NET: -900 mL            PHYSICAL EXAM:    GENERAL: [ ]xNAD, [ x]well-groomed, [ ]well-developed  HEAD:  [ ]Atraumatic, [ ]Normocephalic  EYES: [ ]EOMI, [ ]PERRLA, [ ]conjunctiva and sclera clear  ENMT: [ ]No tonsillar erythema, exudates, or enlargement; [ ]Moist mucous membranes, [ ]Good dentition, [ ]No lesions  NECK: [x ]Supple, normal appearance, [ x]No JVD; [ ]Normal thyroid; [ ]Trachea midline  NERVOUS SYSTEM:  [x ]Alert & Oriented X3, [ x]Good concentration; [ ]Motor Strength 5/5 B/L upper and lower extremities; [ ]DTRs 2+ intact and symmetric  CHEST/LUNG: [x ]No chest deformity; [ ]Normal percussion bilaterally; [ ]No rales, rhonchi, wheezing   HEART: [ x]Regular rate and rhythm; [ x]No murmurs, rubs, or gallops  ABDOMEN: [ x]Soft, Nontender, Nondistended; [x ]Bowel sounds present  EXTREMITIES:  [x ]2+ Peripheral Pulses, [x ]No clubbing, cyanosis, or edema  LYMPH: [ ]No lymphadenopathy noted  SKIN: [x ]No rashes or lesions; [ ]Good capillary refill      LABS:  CBC Full  -  ( 05 Dec 2020 06:36 )  WBC Count : 19.84 K/uL  RBC Count : 4.61 M/uL  Hemoglobin : 14.3 g/dL  Hematocrit : 44.4 %  Platelet Count - Automated : 406 K/uL  Mean Cell Volume : 96.3 fl  Mean Cell Hemoglobin : 31.0 pg  Mean Cell Hemoglobin Concentration : 32.2 gm/dL  Auto Neutrophil # : 17.06 K/uL  Auto Lymphocyte # : 1.98 K/uL  Auto Monocyte # : 0.79 K/uL  Auto Eosinophil # : 0.00 K/uL  Auto Basophil # : 0.00 K/uL  Auto Neutrophil % : 86.0 %  Auto Lymphocyte % : 10.0 %  Auto Monocyte % : 4.0 %  Auto Eosinophil % : 0.0 %  Auto Basophil % : 0.0 %    12-05    136  |  104  |  14  ----------------------------<  159<H>  4.6   |  26  |  0.56    Ca    11.0<H>      05 Dec 2020 06:36  Phos  2.0     12-05  Mg     2.2     12-05    TPro  7.7  /  Alb  2.3<L>  /  TBili  0.2  /  DBili  x   /  AST  23  /  ALT  35  /  AlkPhos  68  12-05            RADIOLOGY & ADDITIONAL STUDIES REVIEWED:  ***    [ ]GOALS OF CARE DISCUSSION WITH PATIENT/FAMILY/PROXY:    CRITICAL CARE TIME SPENT: 35 minutes INTERVAL HPI/OVERNIGHT EVENTS: Patient has been anxious and requesting for morphine and Xanax, Later she desaturated to 80's with tachypnea around 11.30 pm and was palced on BIPAP .     PRESSORS: [ ] YES [ x] NO  WHICH:      Antimicrobial:  remdesivir  IVPB   IV Intermittent   remdesivir  IVPB 100 milliGRAM(s) IV Intermittent every 24 hours    Cardiovascular:    Pulmonary:  ALBUTerol    90 MICROgram(s) HFA Inhaler 2 Puff(s) Inhalation every 6 hours    Hematalogic:  enoxaparin Injectable 40 milliGRAM(s) SubCutaneous daily    Other:  acetaminophen   Tablet .. 650 milliGRAM(s) Oral every 6 hours PRN  ALPRAZolam 2 milliGRAM(s) Oral at bedtime  chlorhexidine 2% Cloths 1 Application(s) Topical <User Schedule>  dexAMETHasone  Injectable 6 milliGRAM(s) IV Push daily  levothyroxine 100 MICROGram(s) Oral daily  morphine  - Injectable 1 milliGRAM(s) IV Push every 8 hours PRN  pantoprazole    Tablet 40 milliGRAM(s) Oral before breakfast  polyethylene glycol 3350 17 Gram(s) Oral daily  senna 2 Tablet(s) Oral at bedtime    acetaminophen   Tablet .. 650 milliGRAM(s) Oral every 6 hours PRN  ALBUTerol    90 MICROgram(s) HFA Inhaler 2 Puff(s) Inhalation every 6 hours  ALPRAZolam 2 milliGRAM(s) Oral at bedtime  chlorhexidine 2% Cloths 1 Application(s) Topical <User Schedule>  dexAMETHasone  Injectable 6 milliGRAM(s) IV Push daily  enoxaparin Injectable 40 milliGRAM(s) SubCutaneous daily  levothyroxine 100 MICROGram(s) Oral daily  morphine  - Injectable 1 milliGRAM(s) IV Push every 8 hours PRN  pantoprazole    Tablet 40 milliGRAM(s) Oral before breakfast  polyethylene glycol 3350 17 Gram(s) Oral daily  remdesivir  IVPB   IV Intermittent   remdesivir  IVPB 100 milliGRAM(s) IV Intermittent every 24 hours  senna 2 Tablet(s) Oral at bedtime    Drug Dosing Weight  Height (cm): 162.6 (01 Dec 2020 09:53)  Weight (kg): 85.7 (01 Dec 2020 09:53)  BMI (kg/m2): 32.4 (01 Dec 2020 09:53)  BSA (m2): 1.91 (01 Dec 2020 09:53)    CENTRAL LINE: [ ] YES [x ] NO  LOCATION:   DATE INSERTED:  REMOVE: [ ] YES [ ] NO  EXPLAIN:    DONALD: [ ] YES [ x] NO    DATE INSERTED:  REMOVE:  [ ] YES [ ] NO  EXPLAIN:    A-LINE:  [ ] YES [x ] NO  LOCATION:   DATE INSERTED:  REMOVE:  [ ] YES [ ] NO  EXPLAIN:    PMH -reviewed admission note, no change since admission  PAST MEDICAL & SURGICAL HISTORY:  Lumbar back pain    Osteoporosis    History of tracheal stenosis    Other specified diseases of upper respiratory tract    Hypothyroidism    Kidney stones    Gallstones    Fatty liver    Thyroid disease    Obesity    Constipation    Gastric ulcer  February 2016- h/o GI bleed which patient had to be intubated Jan 2016    Arthritis of shoulder region, right    Arthritis  right shoulder    S/P bronchoscopy  2017    S/P bronchoscopy  5/16    Hyperparathyroidism  2012 parathyroidectomy    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6   /  SaO2: 92                    12-04 @ 07:01  -  12-05 @ 07:00  --------------------------------------------------------  IN: 1600 mL / OUT: 2500 mL / NET: -900 mL            PHYSICAL EXAM:    GENERAL: Anxious, well-groomed, well-developed  HEAD:  Atraumatic, Normocephalic  EYES: EOMI, PERRLA, conjunctiva and sclera clear  ENMT: No tonsillar erythema, exudates, or enlargement; Moist mucous membranes, No lesions  NECK: Supple, normal appearance, No JVD; Normal thyroid; Trachea midline  NERVOUS SYSTEM:  Alert & Oriented X3, Good concentration; Moving all extremities   CHEST/LUNG: No chest deformity; Normal percussion bilaterally; No rales, mild b/l rhonchi, no wheezing   HEART: Regular rate and rhythm; No murmurs, rubs, or gallops  ABDOMEN: Soft, Nontender, Nondistended; Bowel sounds present  EXTREMITIES:  2+ Peripheral Pulses, No clubbing, cyanosis, or edema  LYMPH: No lymphadenopathy noted  SKIN: No rashes or lesions; Good capillary refill    LABS:  CBC Full  -  ( 05 Dec 2020 06:36 )  WBC Count : 19.84 K/uL  RBC Count : 4.61 M/uL  Hemoglobin : 14.3 g/dL  Hematocrit : 44.4 %  Platelet Count - Automated : 406 K/uL  Mean Cell Volume : 96.3 fl  Mean Cell Hemoglobin : 31.0 pg  Mean Cell Hemoglobin Concentration : 32.2 gm/dL  Auto Neutrophil # : 17.06 K/uL  Auto Lymphocyte # : 1.98 K/uL  Auto Monocyte # : 0.79 K/uL  Auto Eosinophil # : 0.00 K/uL  Auto Basophil # : 0.00 K/uL  Auto Neutrophil % : 86.0 %  Auto Lymphocyte % : 10.0 %  Auto Monocyte % : 4.0 %  Auto Eosinophil % : 0.0 %  Auto Basophil % : 0.0 %    12-05    136  |  104  |  14  ----------------------------<  159<H>  4.6   |  26  |  0.56    Ca    11.0<H>      05 Dec 2020 06:36  Phos  2.0     12-05  Mg     2.2     12-05    TPro  7.7  /  Alb  2.3<L>  /  TBili  0.2  /  DBili  x   /  AST  23  /  ALT  35  /  AlkPhos  68  12-05            RADIOLOGY & ADDITIONAL STUDIES REVIEWED:  ***    [ ]GOALS OF CARE DISCUSSION WITH PATIENT/FAMILY/PROXY:    CRITICAL CARE TIME SPENT: 35 minutes

## 2020-12-06 NOTE — PROGRESS NOTE ADULT - ASSESSMENT
ASSESSMENT AND PLAN:  61 yo F from home lives with son (who is autistic, covid +) PMHx of hypothyroid, sleep apnea, HTN PUD, cervical OA PSH tracheal resection and reconstruction, uterous myoma, presents to the ED c/o shortness of breath x 2 days. Patient underwent sleep studyfrom 11/20 to 11/21 ,diagnosed with DARRELL, spiked fever the day after 101.6 associated with headache, and later on cough and diarrhea and tested positive for COVID on 111/23rd. pt has been having fever and diarrhea since Dx. Pt notes onset of  worsening shortness of breath  for past 2 days.  Patient denies vomiting, rash , joint pain or any other acute complaints. Pt started ABx likley amoxicillin (does not remember te name ) and Decadron 6mg X5 days.    Of note : pt reports h/o massive GIB resulted in emergency intubation for 5 days. Pt reports her trachea was damaged as a result of intubation. s/p tracheal resection and reconstruction April 2016. She has undergone multiple surveillance bronchoscopies,    ED course : patient saturated on high 80s on RA on arrival , was placed on NRB and descalated to NC 4 liters, pt saturated 96% on 4L NC,  on my evaluation patient was saturating 97% on 2 L NC however tachypneic while talking or getting exited.  CXR : patchy basilar infiltrates   (01 Dec 2020 13:01)  Pt was saturating 93% on 100% NRB, transferred to ICU for high flow oxygen therapy.     Assessment:   Acute hypoxic respiratory failure due to COVID PNA   Sleep Apnea,   PUD, hypothyroidism     === Neuro===  Alert and oriented x 3    #Central sleep apnea?  Called her neurologist Dr. Greg Moreno [phone] for details, but no answer    === Cardiovascular===  HTN: not on any medication at home   - continue to monitor for now.     ===- Pulm===  Acute hypooxic respiratory failure due to COVID PNA   c/w Decadron, Remedesivir,   - s/p  Rocephin and Azith 12/1-3, Dcd as CXR consistent with Covid PNA  - On high flow 100% 55L -->60L, Increased to 100% , Patient's work of breathing is increasing   - Patient is on BIPAP at night 12/6  - ID Dr Omar  - Lovenox DVT proph 40 mg BID switched to daily as D-Dimer is <150   - Follow inflammatory markers on alternate days     #sleep Apnea : pt recently had Sleep study done and diagnosed with Sleep apnea on NOv 20 but not started on CPAP yet   OP follow up     ====ID===  PNA: management as above     === Nephro===  no active issues     ===GI===  #Hx of PUD:   - c/w Protonix     === Heme===  #Leukocytosis   - likley due to covid infection  - monitor CBC    ===Endocrine===  #hypothyroidism   - TSH low 0.14  - T3  48, Free Thyroxine 1.3  - c/w Levothyroxine   - Elevated calcium, Get vitamin D, 1-25 BRIAN, PTH     === Skin/Catheters===  No rashes. Peripheral IV lines.     ===Prophylaxis ===  LOVenox 40mg daily for DVT proph   Protonix for  GI prophylaxis    ===GOC===  DNR/DNI, discussed with pt and daughter over phone and MOLST form signed and placed in the chart.     ===Disposition===  Monitor in ICU   ASSESSMENT AND PLAN:  59 yo F from home lives with son (who is autistic, covid +) PMHx of hypothyroid, sleep apnea, HTN PUD, cervical OA PSH tracheal resection and reconstruction, uterous myoma, presents to the ED c/o shortness of breath x 2 days. Patient underwent sleep studyfrom 11/20 to 11/21 ,diagnosed with DARRELL, spiked fever the day after 101.6 associated with headache, and later on cough and diarrhea and tested positive for COVID on 111/23rd. pt has been having fever and diarrhea since Dx. Pt notes onset of  worsening shortness of breath  for past 2 days.  Patient denies vomiting, rash , joint pain or any other acute complaints. Pt started ABx likley amoxicillin (does not remember te name ) and Decadron 6mg X5 days.    Of note : pt reports h/o massive GIB resulted in emergency intubation for 5 days. Pt reports her trachea was damaged as a result of intubation. s/p tracheal resection and reconstruction April 2016. She has undergone multiple surveillance bronchoscopies,    ED course : patient saturated on high 80s on RA on arrival , was placed on NRB and descalated to NC 4 liters, pt saturated 96% on 4L NC,  on my evaluation patient was saturating 97% on 2 L NC however tachypneic while talking or getting exited.  CXR : patchy basilar infiltrates   (01 Dec 2020 13:01)  Pt was saturating 93% on 100% NRB, transferred to ICU for high flow oxygen therapy.     Assessment:   Acute hypoxic respiratory failure due to COVID PNA   Sleep Apnea,   PUD, hypothyroidism     === Neuro===  Alert and oriented x 3    #Anxiety  - added Xanax 0.5mg daily as spO2 drops when pt becomes anxious     #Central sleep apnea?  Called her neurologist Dr. Greg Moreno [phone] for details, but no answer    === Cardiovascular===  HTN: not on any medication at home   - continue to monitor for now.     ===- Pulm===  Acute hypooxic respiratory failure due to COVID PNA   c/w Decadron, Remedesivir,   - s/p Rocephin and Azith 12/1-3, Dcd as CXR consistent with Covid PNA  - On high flow 100% 60L, Patient's work of breathing is increasing   - s/p plasma 12/4 as per pt's request. Explained the study doesn't show benefit  - Patient is on BIPAP at night 12/6  - ID Dr Nelson  - Lovenox DVT proph 40 mg BID switched to daily as D-Dimer is <150   - Follow inflammatory markers on alternate days     #sleep Apnea : pt recently had Sleep study done and diagnosed with Sleep apnea on NOv 20 but not started on CPAP yet   OP follow up     ====ID===  PNA: management as above     === Nephro===  no active issues     ===GI===  #Elevated LFTs  - LFTs are trending up  - likely from covid  - f/u hepatitis panel  - monitor LFTs    #Hx of PUD:   - c/w Protonix     === Heme===  #Leukocytosis   - likley due to covid infection  - monitor CBC    ===Endocrine===  #hypothyroidism   - TSH low 0.14  - T3  48, Free Thyroxine 1.3  - c/w Levothyroxine     #Hypercalcemia  - Elevated calcium 11, vit D-25 WNL,  vitamin D, 1-25 high, PTH: high   - f/u PTHrp    === Skin/Catheters===  No rashes. Peripheral IV lines.     ===Prophylaxis ===  LOVenox 40mg daily for DVT proph   Protonix for  GI prophylaxis    ===GOC===  DNR/DNI, discussed with pt and daughter over phone and MOLST form signed and placed in the chart.     ===Disposition===  Monitor in ICU

## 2020-12-06 NOTE — PROGRESS NOTE ADULT - SUBJECTIVE AND OBJECTIVE BOX
Patient is a 60y old  Female who presents with a chief complaint of COVID PNA requiring oxygen supplementation (06 Dec 2020 06:49)  Awake, alert, comfortable in bed in NAD. Remains with high flow oxygen supp    INTERVAL HPI/OVERNIGHT EVENTS:      VITAL SIGNS:  T(F): 96.8 (12-06-20 @ 07:00)  HR: 91 (12-06-20 @ 12:09)  BP: 106/62 (12-06-20 @ 11:00)  RR: 32 (12-06-20 @ 12:11)  SpO2: 94% (12-06-20 @ 12:11)  Wt(kg): --  I&O's Detail    05 Dec 2020 07:01  -  06 Dec 2020 07:00  --------------------------------------------------------  IN:    IV PiggyBack: 250 mL    Oral Fluid: 1080 mL  Total IN: 1330 mL    OUT:    Voided (mL): 2350 mL  Total OUT: 2350 mL    Total NET: -1020 mL      06 Dec 2020 07:01  -  06 Dec 2020 12:25  --------------------------------------------------------  IN:    Oral Fluid: 100 mL  Total IN: 100 mL    OUT:  Total OUT: 0 mL    Total NET: 100 mL              REVIEW OF SYSTEMS:    CONSTITUTIONAL:  No fevers, chills, sweats    HEENT:  Eyes:  No diplopia or blurred vision. ENT:  No earache, sore throat or runny nose.    CARDIOVASCULAR:  No pressure, squeezing, tightness, or heaviness about the chest; no palpitations.    RESPIRATORY:  Per HPI    GASTROINTESTINAL:  No abdominal pain, nausea, vomiting or diarrhea.    GENITOURINARY:  No dysuria, frequency or urgency.    NEUROLOGIC:  No paresthesias, fasciculations, seizures or weakness.    PSYCHIATRIC:  No disorder of thought or mood.      PHYSICAL EXAM:    Constitutional: Well developed and nourished  Eyes:Perrla  ENMT: normal  Neck:supple  Respiratory: good air entry  Cardiovascular: S1 S2 regular  Gastrointestinal: Soft, Non tender  Extremities: No edema  Vascular:normal  Neurological:Awake, alert,Ox3  Musculoskeletal:Normal      MEDICATIONS  (STANDING):  ALBUTerol    90 MICROgram(s) HFA Inhaler 2 Puff(s) Inhalation every 6 hours  ALPRAZolam 2 milliGRAM(s) Oral at bedtime  ALPRAZolam 0.5 milliGRAM(s) Oral <User Schedule>  chlorhexidine 2% Cloths 1 Application(s) Topical <User Schedule>  dexAMETHasone     Tablet 6 milliGRAM(s) Oral daily  enoxaparin Injectable 40 milliGRAM(s) SubCutaneous daily  levothyroxine 100 MICROGram(s) Oral daily  lidocaine 2% Gel 1 Application(s) Topical once  pantoprazole    Tablet 40 milliGRAM(s) Oral before breakfast  polyethylene glycol 3350 17 Gram(s) Oral daily  remdesivir  IVPB   IV Intermittent   remdesivir  IVPB 100 milliGRAM(s) IV Intermittent every 24 hours  senna 2 Tablet(s) Oral at bedtime    MEDICATIONS  (PRN):  acetaminophen   Tablet .. 650 milliGRAM(s) Oral every 6 hours PRN Temp greater or equal to 38C (100.4F), Mild Pain (1 - 3)  morphine  - Injectable 1 milliGRAM(s) IV Push every 8 hours PRN Respiratory distress      Allergies    No Known Allergies    Intolerances        LABS:                        14.3   19.84 )-----------( 406      ( 05 Dec 2020 06:36 )             44.4     12-05    136  |  104  |  14  ----------------------------<  159<H>  4.6   |  26  |  0.56    Ca    11.0<H>      05 Dec 2020 06:36  Phos  2.0     12-05  Mg     2.2     12-05    TPro  7.7  /  Alb  2.3<L>  /  TBili  0.2  /  DBili  x   /  AST  23  /  ALT  35  /  AlkPhos  68  12-05        ABG - ( 05 Dec 2020 17:03 )  pH, Arterial: 7.40  pH, Blood: x     /  pCO2: 43    /  pO2: 66    / HCO3: 26    / Base Excess: 1.6   /  SaO2: 92                    CAPILLARY BLOOD GLUCOSE        pro-bnp -- 12-05 @ 15:42     d-dimer <150  12-05 @ 15:42  pro-bnp -- 12-01 @ 11:26     d-dimer <150  12-01 @ 11:26      RADIOLOGY & ADDITIONAL TESTS:    CXR:  r< from: Xray Chest 1 View- PORTABLE-Routine (Xray Chest 1 View- PORTABLE-Routine in AM.) (12.05.20 @ 10:43) >  FINDINGS:  The lungs show persistent  diffuse perihilar basilar airspace disease.    The heart and mediastinum are within normal limits.    Visualized osseous structures are intact.      < end of copied text >    Ct scan chest:    ekg;    echo:

## 2020-12-06 NOTE — PROGRESS NOTE ADULT - SUBJECTIVE AND OBJECTIVE BOX
ICU VISIT  60y Female    Meds:    Allergies    No Known Allergies    Intolerances        VITALS:  Vital Signs Last 24 Hrs  T(C): 36 (06 Dec 2020 07:00), Max: 36.6 (05 Dec 2020 16:00)  T(F): 96.8 (06 Dec 2020 07:00), Max: 97.9 (05 Dec 2020 16:00)  HR: 80 (06 Dec 2020 14:00) (67 - 106)  BP: 110/59 (06 Dec 2020 12:00) (106/62 - 159/81)  BP(mean): 69 (06 Dec 2020 12:00) (68 - 106)  RR: 27 (06 Dec 2020 14:00) (19 - 44)  SpO2: 92% (06 Dec 2020 14:00) (88% - 100%)    LABS/DIAGNOSTIC TESTS:                          13.2   16.23 )-----------( 403      ( 06 Dec 2020 12:28 )             40.5         12-06    133<L>  |  99  |  20<H>  ----------------------------<  285<H>  4.1   |  25  |  0.74    Ca    11.1<H>      06 Dec 2020 14:12  Phos  2.5     12-06  Mg     2.0     12-06    TPro  7.3  /  Alb  2.1<L>  /  TBili  0.3  /  DBili  x   /  AST  213<H>  /  ALT  536<H>  /  AlkPhos  342<H>  12-06      LIVER FUNCTIONS - ( 06 Dec 2020 14:12 )  Alb: 2.1 g/dL / Pro: 7.3 g/dL / ALK PHOS: 342 U/L / ALT: 536 U/L DA / AST: 213 U/L / GGT: x             CULTURES:       RADIOLOGY:< from: Xray Chest 1 View- PORTABLE-Routine (Xray Chest 1 View- PORTABLE-Routine in AM.) (12.05.20 @ 10:43) >  EXAM:  XR CHEST PORTABLE ROUTINE 1V                            PROCEDURE DATE:  12/05/2020          INTERPRETATION:  Portable chest radiograph    CLINICAL INFORMATION: Pneumonia due to Covid 19.    TECHNIQUE:  Portable  AP view of the chest was obtained.    COMPARISON: 12/4/2020 available for review.    FINDINGS:  The lungs show persistent  diffuse perihilar basilar airspace disease.    The heart and mediastinum are within normal limits.    Visualized osseous structures are intact.        IMPRESSION: No interval change . Diffuse bilateral perihilar peripheral airspace disease..              CHANTAL MENDOSA MD; Attending Radiologist  This document has been electronically signed. Dec  5 2020 12:23PM    < end of copied text >        ROS:  [  ] UNABLE TO ELICIT ICU VISIT  60y Female who remains in the ICU, she remains on high flow oxygen at 40 liters min, she is very lethargic today. She has worsening infiltrates on her CXR, she completed her Remdesivir treatment but her LFTs increased dramatically and she has not had any episodes of hypotension in the last 48hrs to account for it, hence likely secondary to Remdesivir.     Meds:    Allergies    No Known Allergies    Intolerances        VITALS:  Vital Signs Last 24 Hrs  T(C): 36 (06 Dec 2020 07:00), Max: 36.6 (05 Dec 2020 16:00)  T(F): 96.8 (06 Dec 2020 07:00), Max: 97.9 (05 Dec 2020 16:00)  HR: 80 (06 Dec 2020 14:00) (67 - 106)  BP: 110/59 (06 Dec 2020 12:00) (106/62 - 159/81)  BP(mean): 69 (06 Dec 2020 12:00) (68 - 106)  RR: 27 (06 Dec 2020 14:00) (19 - 44)  SpO2: 92% (06 Dec 2020 14:00) (88% - 100%)    LABS/DIAGNOSTIC TESTS:                          13.2   16.23 )-----------( 403      ( 06 Dec 2020 12:28 )             40.5         12-06    133<L>  |  99  |  20<H>  ----------------------------<  285<H>  4.1   |  25  |  0.74    Ca    11.1<H>      06 Dec 2020 14:12  Phos  2.5     12-06  Mg     2.0     12-06    TPro  7.3  /  Alb  2.1<L>  /  TBili  0.3  /  DBili  x   /  AST  213<H>  /  ALT  536<H>  /  AlkPhos  342<H>  12-06      LIVER FUNCTIONS - ( 06 Dec 2020 14:12 )  Alb: 2.1 g/dL / Pro: 7.3 g/dL / ALK PHOS: 342 U/L / ALT: 536 U/L DA / AST: 213 U/L / GGT: x             CULTURES:       RADIOLOGY:< from: Xray Chest 1 View- PORTABLE-Routine (Xray Chest 1 View- PORTABLE-Routine in AM.) (12.05.20 @ 10:43) >  EXAM:  XR CHEST PORTABLE ROUTINE 1V                            PROCEDURE DATE:  12/05/2020          INTERPRETATION:  Portable chest radiograph    CLINICAL INFORMATION: Pneumonia due to Covid 19.    TECHNIQUE:  Portable  AP view of the chest was obtained.    COMPARISON: 12/4/2020 available for review.    FINDINGS:  The lungs show persistent  diffuse perihilar basilar airspace disease.    The heart and mediastinum are within normal limits.    Visualized osseous structures are intact.        IMPRESSION: No interval change . Diffuse bilateral perihilar peripheral airspace disease..              CHANTAL MENDOSA MD; Attending Radiologist  This document has been electronically signed. Dec  5 2020 12:23PM    < end of copied text >        ROS:  [ x ] UNABLE TO ELICIT

## 2020-12-07 NOTE — PROGRESS NOTE ADULT - SUBJECTIVE AND OBJECTIVE BOX
Patient is a 60y old  Female who presents with a chief complaint of COVID PNA requiring oxygen supplementation (06 Dec 2020 15:58)      pt seen in icu [  ], reg med floor [ x  ], bed [ x ], chair at bedside [   ], a+o x3 [ x ], lethargic [  ],    nad [x  ]        Allergies    No Known Allergies        Vitals    T(F): 98.8 (12-06-20 @ 20:00), Max: 99.2 (12-06-20 @ 16:34)  HR: 76 (12-07-20 @ 04:30) (76 - 115)  BP: 106/74 (12-07-20 @ 02:00) (104/74 - 156/69)  RR: 30 (12-07-20 @ 04:29) (18 - 44)  SpO2: 91% (12-07-20 @ 04:30) (81% - 100%)  Wt(kg): --  CAPILLARY BLOOD GLUCOSE          Labs                          14.6   14.91 )-----------( 329      ( 07 Dec 2020 05:35 )             45.4       12-07    133<L>  |  101  |  18  ----------------------------<  170<H>  5.3   |  23  |  0.56    Ca    10.5      07 Dec 2020 05:35  Phos  2.6     12-07  Mg     2.2     12-07    TPro  7.3  /  Alb  2.1<L>  /  TBili  0.7  /  DBili  x   /  AST  91<H>  /  ALT  398<H>  /  AlkPhos  296<H>  12-07                Radiology Results      Meds    MEDICATIONS  (STANDING):  ALBUTerol    90 MICROgram(s) HFA Inhaler 2 Puff(s) Inhalation every 6 hours  ALPRAZolam 1 milliGRAM(s) Oral every 12 hours  chlorhexidine 2% Cloths 1 Application(s) Topical <User Schedule>  dexAMETHasone     Tablet 6 milliGRAM(s) Oral daily  enoxaparin Injectable 40 milliGRAM(s) SubCutaneous daily  levothyroxine 100 MICROGram(s) Oral daily  pantoprazole    Tablet 40 milliGRAM(s) Oral before breakfast  polyethylene glycol 3350 17 Gram(s) Oral daily  senna 2 Tablet(s) Oral at bedtime      MEDICATIONS  (PRN):  acetaminophen   Tablet .. 650 milliGRAM(s) Oral every 6 hours PRN Temp greater or equal to 38C (100.4F), Mild Pain (1 - 3)  morphine  - Injectable 1 milliGRAM(s) IV Push every 8 hours PRN Respiratory distress      Physical Exam    Neuro :  no focal deficits  Respiratory: CTA B/L  CV: RRR, S1S2, no murmurs,   Abdominal: Soft, NT, ND +BS,  Extremities: No edema, + peripheral pulses          ASSESSMENT    Infection due to severe acute respiratory syndrome coronavirus 2 (SARS-CoV-2)  pneumonia,   hypoxia,   h/o hypothyroid,   sleep apnea,   PUD,   cervical OA   tracheal resection and reconstruction,   uterus myoma,   HTN,   gi bleed  Osteoporosis  Hyperparathyroidism      PLAN    id f/u   Cont Decadron 6mgs iv q24hrs  Cont Remdesivir  100mgs iv q24 hrs last dose today.  s/p convalescent plasma  cont decadron,   cont contact and airborne isolation,   cont albuterol inhaler,   cont tylenol prn, robitussin prn   pulm f/u   O2 sat (84% - 98%) on high flow   cont high flow O2 via nasal canula as needed,   pt afebrile  dvt prophylaxis  covid-19 antibody test neg,   resp viral panel positive for covid 19,   procalcitonin, D-dimer wnl noted     esr, crp, ldh, ferritin elevated noted    cont current meds  mgmt as per icu         Patient is a 60y old  Female who presents with a chief complaint of COVID PNA requiring oxygen supplementation (06 Dec 2020 15:58)      pt seen in icu [  ], reg med floor [ x  ], bed [ x ], chair at bedside [   ], a+o x3 [ x ], lethargic [  ],    nad [x  ]        Allergies    No Known Allergies        Vitals    T(F): 98.8 (12-06-20 @ 20:00), Max: 99.2 (12-06-20 @ 16:34)  HR: 76 (12-07-20 @ 04:30) (76 - 115)  BP: 106/74 (12-07-20 @ 02:00) (104/74 - 156/69)  RR: 30 (12-07-20 @ 04:29) (18 - 44)  SpO2: 91% (12-07-20 @ 04:30) (81% - 100%)  Wt(kg): --  CAPILLARY BLOOD GLUCOSE          Labs                          14.6   14.91 )-----------( 329      ( 07 Dec 2020 05:35 )             45.4       12-07    133<L>  |  101  |  18  ----------------------------<  170<H>  5.3   |  23  |  0.56    Ca    10.5      07 Dec 2020 05:35  Phos  2.6     12-07  Mg     2.2     12-07    TPro  7.3  /  Alb  2.1<L>  /  TBili  0.7  /  DBili  x   /  AST  91<H>  /  ALT  398<H>  /  AlkPhos  296<H>  12-07        Blood Gas Profile - Arterial (12.06.20 @ 14:12)   pH, Arterial: 7.45   pCO2, Arterial: 38 mmHg   pO2, Arterial: 55 mmHg   HCO3, Arterial: 27 mmol/L   Base Excess, Arterial: 3.1 mmol/L   Oxygen Saturation, Arterial: 88 %   FIO2, Arterial: 80               Radiology Results      Meds    MEDICATIONS  (STANDING):  ALBUTerol    90 MICROgram(s) HFA Inhaler 2 Puff(s) Inhalation every 6 hours  ALPRAZolam 1 milliGRAM(s) Oral every 12 hours  chlorhexidine 2% Cloths 1 Application(s) Topical <User Schedule>  dexAMETHasone     Tablet 6 milliGRAM(s) Oral daily  enoxaparin Injectable 40 milliGRAM(s) SubCutaneous daily  levothyroxine 100 MICROGram(s) Oral daily  pantoprazole    Tablet 40 milliGRAM(s) Oral before breakfast  polyethylene glycol 3350 17 Gram(s) Oral daily  senna 2 Tablet(s) Oral at bedtime      MEDICATIONS  (PRN):  acetaminophen   Tablet .. 650 milliGRAM(s) Oral every 6 hours PRN Temp greater or equal to 38C (100.4F), Mild Pain (1 - 3)  morphine  - Injectable 1 milliGRAM(s) IV Push every 8 hours PRN Respiratory distress      Physical Exam    Neuro :  no focal deficits  Respiratory: CTA B/L  CV: RRR, S1S2, no murmurs,   Abdominal: Soft, NT, ND +BS,  Extremities: No edema, + peripheral pulses          ASSESSMENT    Infection due to severe acute respiratory syndrome coronavirus 2 (SARS-CoV-2)  pneumonia,   hypoxia,   h/o hypothyroid,   sleep apnea,   PUD,   cervical OA   tracheal resection and reconstruction,   uterus myoma,   HTN,   gi bleed  Osteoporosis  Hyperparathyroidism      PLAN    id f/u   Cont Decadron 6mgs iv q24hrs  completed Remdesivir 12/06/20  s/p convalescent plasma  cont decadron,   cont contact and airborne isolation,   cont albuterol inhaler,   cont tylenol prn, robitussin prn   pulm f/u   O2 sat (81% - 100%) on high flow   abg noted above with hypoxia  cont high flow O2 via nasal canula as needed,   pt afebrile  dvt prophylaxis  covid-19 antibody test neg,   resp viral panel positive for covid 19,   procalcitonin, D-dimer wnl noted     esr, crp, ldh, ferritin elevated noted    endo cons  cont current meds  mgmt as per icu

## 2020-12-07 NOTE — PROGRESS NOTE ADULT - RS GEN HX ROS MEA POS PC
cough/dyspnea
dyspnea/cough/pleuritic chest pain/all improving slowly
dyspnea/cough
dyspnea/cough/pleuritic chest pain
dyspnea/cough/pleuritic chest pain

## 2020-12-07 NOTE — PROGRESS NOTE ADULT - RS GEN PE MLT RESP DETAILS PC
breath sounds equal/no rales/good air movement/no rhonchi/clear to auscultation bilaterally/no wheezes
good air movement/clear to auscultation bilaterally/breath sounds equal/no wheezes/no rales/no rhonchi
no wheezes/breath sounds equal/good air movement/no rales/no rhonchi/clear to auscultation bilaterally
no rhonchi/rales/breath sounds equal/good air movement
no wheezes/rales/good air movement/no rhonchi/breath sounds equal
no wheezes/rales/no rhonchi/breath sounds equal/good air movement

## 2020-12-07 NOTE — PROGRESS NOTE ADULT - NS NEC GEN PE MLT EXAM PC
detailed exam
No bruits; no thyromegaly or nodules
detailed exam

## 2020-12-07 NOTE — CONSULT NOTE ADULT - PROBLEM SELECTOR RECOMMENDATION 9
due to hyperparathyroidism  high vitamin d 1-25 ? etiology-   hx of parathyroid surgery r/o pth hyperplasia  cont to monitor calcium  keep pt hydrated   consider sensipar vs neck reexploration when stable as out pt

## 2020-12-07 NOTE — PROGRESS NOTE ADULT - CONSTITUTIONAL DETAILS
well-nourished/respiratory distress/well-developed/obese
respiratory distress/well-groomed/well-developed/well-nourished
respiratory distress/well-nourished/well-developed/well-groomed
well-nourished/well-developed/well-groomed/respiratory distress
obese/well-nourished/respiratory distress/well-developed/well-groomed
well-developed/well-groomed/well-nourished/obese/respiratory distress

## 2020-12-07 NOTE — PROGRESS NOTE ADULT - ASSESSMENT
ASSESSMENT AND PLAN:  61 yo F from home lives with son (who is autistic, covid +) PMHx of hypothyroid, sleep apnea, HTN PUD, cervical OA PSH tracheal resection and reconstruction, uterous myoma, presents to the ED c/o shortness of breath x 2 days. Patient underwent sleep studyfrom 11/20 to 11/21 ,diagnosed with DARRELL, spiked fever the day after 101.6 associated with headache, and later on cough and diarrhea and tested positive for COVID on 111/23rd. pt has been having fever and diarrhea since Dx. Pt notes onset of  worsening shortness of breath  for past 2 days.  Patient denies vomiting, rash , joint pain or any other acute complaints. Pt started ABx likley amoxicillin (does not remember te name ) and Decadron 6mg X5 days.    Of note : pt reports h/o massive GIB resulted in emergency intubation for 5 days. Pt reports her trachea was damaged as a result of intubation. s/p tracheal resection and reconstruction April 2016. She has undergone multiple surveillance bronchoscopies,    ED course : patient saturated on high 80s on RA on arrival , was placed on NRB and descalated to NC 4 liters, pt saturated 96% on 4L NC,  on my evaluation patient was saturating 97% on 2 L NC however tachypneic while talking or getting exited.  CXR : patchy basilar infiltrates   (01 Dec 2020 13:01)  Pt was saturating 93% on 100% NRB, transferred to ICU for high flow oxygen therapy.     Assessment:   Acute hypoxic respiratory failure due to COVID PNA   Sleep Apnea,   PUD, hypothyroidism     === Neuro===  Alert and oriented x 3    #Anxiety  - added Xanax 0.5mg BID as spO2 drops when pt becomes anxious     #Central sleep apnea?  Called her neurologist Dr. Greg Moreno [phone] for details, but no answer , Will defer it for outpatient     === Cardiovascular===  HTN: not on any medication at home   - continue to monitor for now.     ===- Pulm===  Acute hypoxic respiratory failure due to COVID PNA   c/w Decadron, Remedesivir,   - s/p Rocephin and Azith 12/1-3, Dcd as CXR consistent with Covid PNA  - On high flow 97% 55L, Patient's work of breathing is istable- s/p plasma 12/4 as per pt's request. Explained the study doesn't show benefit  - Patient is on BIPAP at night 12/6  - ID Dr Nelson  - Lovenox DVT proph 40 mg daily as D-Dimer is <150   - Follow inflammatory markers on alternate days     #sleep Apnea : pt recently had Sleep study done and diagnosed with Sleep apnea on Nov 20 but not started on CPAP yet   OP follow up     ====ID===  PNA: management as above     === Nephro===  no active issues     ===GI===  #Elevated LFTs  - LFTs are trending up  - likely from covid  - Hepatitis C panel negative, Hepatitis B pending   - monitor LFTs    #Hx of PUD:   - c/w Protonix     === Heme===  #Leukocytosis   - Likely due to covid infection  - monitor CBC    ===Endocrine===  #hypothyroidism   - TSH low 0.14  - T3  48, Free Thyroxine 1.3  - c/w Levothyroxine     #Hypercalcemia  - Elevated calcium 11, vit D-25 WNL,  vitamin D, 1-25 high, PTH: high   - f/u PTHrp    === Skin/Catheters===  No rashes. Peripheral IV lines.     ===Prophylaxis ===  LOVenox 40mg daily for DVT proph   Protonix for  GI prophylaxis    ===GOC===  DNR/DNI, discussed with pt and daughter over phone and MOLST form signed and placed in the chart.     ===Disposition===  Monitor in ICU

## 2020-12-07 NOTE — CONSULT NOTE ADULT - SUBJECTIVE AND OBJECTIVE BOX
Patient is a 60y old  Female who presents with a chief complaint of COVID PNA requiring oxygen supplementation (07 Dec 2020 07:43)      HPI:  61 yo F from home lives with son (who is autistic, covid +) PMHx of hypothyroid, sleep apnea, PUD, cervical OA PSH tracheal resection and reconstruction, uterous myoma, HTN,  presents to the ED c/o shortness of breath x 2 days. Patient underwent sleep studyfrom 11/20 to 11/21 ,diagnosed with DARRELL, spiked fever the day after 101.6 associated with headache, and later on cough and diarrhea and tested positive for COVID on 111/23rd. pt has been having fever and diarrhea since Dx. Pt notes onset of  worsening shortness of breath  for past 2 days.  Patient denies vomiting, rash , joint pain or any other acute complaints. Pt started ABx likley amoxicillin (does not remember te name ) and Decadron 6mg X5 days.      Of note : pt reports h/o massive GIB resulted in emergency intubation for 5 days. Pt reports her trachea was damaged as a result of intubation. s/p tracheal resection and reconstruction April 2016. She has undergone multiple surveillance bronchoscopies, Patient wants to be DNI given her background, however requested for cardiac resuscitation.        ED course : patient saturated on high 80s on RA on arrival , was placed on NRB and descalated to NC 4 liters, pt saturated 96% on 4L NC,  on my evaluation patient was saturating 97% on 2 L NC however tachypneic while talking or getting exited.    CXR : patchy basilar infiltrates     (01 Dec 2020 13:01)      PAST MEDICAL & SURGICAL HISTORY:  Lumbar back pain    Osteoporosis    History of tracheal stenosis    Other specified diseases of upper respiratory tract    Hypothyroidism    Kidney stones    Gallstones    Fatty liver    Thyroid disease    Obesity    Constipation    Gastric ulcer  February 2016- h/o GI bleed which patient had to be intubated Jan 2016    Arthritis of shoulder region, right    Arthritis  right shoulder    S/P bronchoscopy  2017    S/P bronchoscopy  5/16    Hyperparathyroidism  2012 parathyroidectomy    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    Gastric ulcer  &quot;four endoscopies&quot; for diagnosis of gastric ulcer in February 2016           MEDICATIONS  (STANDING):  ALBUTerol    90 MICROgram(s) HFA Inhaler 2 Puff(s) Inhalation every 6 hours  ALPRAZolam 1 milliGRAM(s) Oral every 12 hours  chlorhexidine 2% Cloths 1 Application(s) Topical <User Schedule>  dexAMETHasone     Tablet 6 milliGRAM(s) Oral daily  enoxaparin Injectable 40 milliGRAM(s) SubCutaneous daily  levothyroxine 100 MICROGram(s) Oral daily  pantoprazole    Tablet 40 milliGRAM(s) Oral before breakfast  polyethylene glycol 3350 17 Gram(s) Oral daily  senna 2 Tablet(s) Oral at bedtime    MEDICATIONS  (PRN):  acetaminophen   Tablet .. 650 milliGRAM(s) Oral every 6 hours PRN Temp greater or equal to 38C (100.4F), Mild Pain (1 - 3)  morphine  - Injectable 1 milliGRAM(s) IV Push every 8 hours PRN Respiratory distress      FAMILY HISTORY:  Family history of MI (myocardial infarction) (Father, Sibling)        SOCIAL HISTORY:      REVIEW OF SYSTEMS:  CONSTITUTIONAL: No fever, weight loss, or fatigue  EYES: No eye pain, visual disturbances, or discharge  ENT:  No difficulty hearing, tinnitus, vertigo; No sinus or throat pain  NECK: No pain or stiffness  RESPIRATORY: No cough, wheezing, chills or hemoptysis; No Shortness of Breath  CARDIOVASCULAR: No chest pain, palpitations, passing out, dizziness, or leg swelling  GASTROINTESTINAL: No abdominal or epigastric pain. No nausea, vomiting, or hematemesis; No diarrhea or constipation. No melena or hematochezia.  GENITOURINARY: No dysuria, frequency, hematuria, or incontinence  NEUROLOGICAL: No headaches, memory loss, loss of strength, numbness, or tremors  SKIN: No itching, burning, rashes, or lesions   LYMPH Nodes: No enlarged glands  ENDOCRINE: No heat or cold intolerance; No hair loss  MUSCULOSKELETAL: No joint pain or swelling; No muscle, back, or extremity pain  PSYCHIATRIC: No depression, anxiety, mood swings, or difficulty sleeping  HEME/LYMPH: No easy bruising, or bleeding gums  ALLERGY AND IMMUNOLOGIC: No hives or eczema	        Vital Signs Last 24 Hrs  T(C): 37.1 (06 Dec 2020 20:00), Max: 37.3 (06 Dec 2020 16:34)  T(F): 98.8 (06 Dec 2020 20:00), Max: 99.2 (06 Dec 2020 16:34)  HR: 83 (07 Dec 2020 09:04) (72 - 115)  BP: 118/48 (07 Dec 2020 08:00) (104/74 - 156/69)  BP(mean): 62 (07 Dec 2020 08:00) (62 - 107)  RR: 24 (07 Dec 2020 09:00) (18 - 44)  SpO2: 92% (07 Dec 2020 09:04) (81% - 94%)      Constitutional:    NC/AT:    HEENT:    Neck:  No JVD, bruits or thyromegaly    Respiratory:  Clear without rales or rhonchi    Cardiovascular:  RR without murmur, rub or gallop.    Gastrointestinal: Soft without hepatosplenomegaly.    Extremities: without cyanosis, clubbing or edema.    Neurological:  Oriented   x      . No gross sensory or motor defects.        LABS:                        14.6   14.91 )-----------( 329      ( 07 Dec 2020 05:35 )             45.4     12-07    133<L>  |  101  |  18  ----------------------------<  170<H>  5.3   |  23  |  0.56    Ca    10.5      07 Dec 2020 05:35  Phos  2.6     12-07  Mg     2.2     12-07    TPro  7.3  /  Alb  2.1<L>  /  TBili  0.7  /  DBili  x   /  AST  91<H>  /  ALT  398<H>  /  AlkPhos  296<H>  12-07            CAPILLARY BLOOD GLUCOSE          RADIOLOGY & ADDITIONAL STUDIES: Patient is a 60y old  Female who presents with a chief complaint of COVID PNA requiring oxygen supplementation (07 Dec 2020 07:43)      HPI:  61 yo F from home lives with son (who is autistic, covid +) PMHx of hypothyroid, sleep apnea, PUD, cervical OA PSH tracheal resection and reconstruction, uterous myoma, HTN,  presents to the ED c/o shortness of breath x 2 days. Patient underwent sleep studyfrom 11/20 to 11/21 ,diagnosed with DARRELL, spiked fever the day after 101.6 associated with headache, and later on cough and diarrhea and tested positive for COVID on 111/23rd. pt has been having fever and diarrhea since Dx. Pt notes onset of  worsening shortness of breath  for past 2 days.  Patient denies vomiting, rash , joint pain or any other acute complaints. Pt started ABx likley amoxicillin (does not remember te name ) and Decadron 6mg X5 days.      Of note : pt reports h/o massive GIB resulted in emergency intubation for 5 days. Pt reports her trachea was damaged as a result of intubation. s/p tracheal resection and reconstruction April 2016. She has undergone multiple surveillance bronchoscopies, Patient wants to be DNI given her background, however requested for cardiac resuscitation.        ED course : patient saturated on high 80s on RA on arrival , was placed on NRB and descalated to NC 4 liters, pt saturated 96% on 4L NC,  on my evaluation patient was saturating 97% on 2 L NC however tachypneic while talking or getting exited.    CXR : patchy basilar infiltrates      Found to have hypercalcemia. Pt has hx of hyperparathyroidism s/p ? parathyroidectomy.      PAST MEDICAL & SURGICAL HISTORY:  Lumbar back pain    Osteoporosis    History of tracheal stenosis    Other specified diseases of upper respiratory tract    Hypothyroidism    Kidney stones    Gallstones    Fatty liver    Thyroid disease    Obesity    Constipation    Gastric ulcer  February 2016- h/o GI bleed which patient had to be intubated Jan 2016    Arthritis of shoulder region, right    Arthritis  right shoulder    S/P bronchoscopy  2017    S/P bronchoscopy  5/16    Hyperparathyroidism  2012 parathyroidectomy    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    Gastric ulcer  &quot;four endoscopies&quot; for diagnosis of gastric ulcer in February 2016           MEDICATIONS  (STANDING):  ALBUTerol    90 MICROgram(s) HFA Inhaler 2 Puff(s) Inhalation every 6 hours  ALPRAZolam 1 milliGRAM(s) Oral every 12 hours  chlorhexidine 2% Cloths 1 Application(s) Topical <User Schedule>  dexAMETHasone     Tablet 6 milliGRAM(s) Oral daily  enoxaparin Injectable 40 milliGRAM(s) SubCutaneous daily  levothyroxine 100 MICROGram(s) Oral daily  pantoprazole    Tablet 40 milliGRAM(s) Oral before breakfast  polyethylene glycol 3350 17 Gram(s) Oral daily  senna 2 Tablet(s) Oral at bedtime    MEDICATIONS  (PRN):  acetaminophen   Tablet .. 650 milliGRAM(s) Oral every 6 hours PRN Temp greater or equal to 38C (100.4F), Mild Pain (1 - 3)  morphine  - Injectable 1 milliGRAM(s) IV Push every 8 hours PRN Respiratory distress      FAMILY HISTORY:  Family history of MI (myocardial infarction) (Father, Sibling)        SOCIAL HISTORY:      REVIEW OF SYSTEMS:  CONSTITUTIONAL: No fever, weight loss, or fatigue  EYES: No eye pain, visual disturbances, or discharge  ENT:  No difficulty hearing, tinnitus, vertigo; No sinus or throat pain  NECK: No pain or stiffness  RESPIRATORY: No cough, wheezing, chills or hemoptysis; No Shortness of Breath  CARDIOVASCULAR: No chest pain, palpitations, passing out, dizziness, or leg swelling  GASTROINTESTINAL: No abdominal or epigastric pain. No nausea, vomiting, or hematemesis; No diarrhea or constipation. No melena or hematochezia.  GENITOURINARY: No dysuria, frequency, hematuria, or incontinence  NEUROLOGICAL: No headaches, memory loss, loss of strength, numbness, or tremors  SKIN: No itching, burning, rashes, or lesions   LYMPH Nodes: No enlarged glands  ENDOCRINE: No heat or cold intolerance; No hair loss  MUSCULOSKELETAL: No joint pain or swelling; No muscle, back, or extremity pain  PSYCHIATRIC: No depression, anxiety, mood swings, or difficulty sleeping  HEME/LYMPH: No easy bruising, or bleeding gums  ALLERGY AND IMMUNOLOGIC: No hives or eczema	        Vital Signs Last 24 Hrs  T(C): 37.1 (06 Dec 2020 20:00), Max: 37.3 (06 Dec 2020 16:34)  T(F): 98.8 (06 Dec 2020 20:00), Max: 99.2 (06 Dec 2020 16:34)  HR: 83 (07 Dec 2020 09:04) (72 - 115)  BP: 118/48 (07 Dec 2020 08:00) (104/74 - 156/69)  BP(mean): 62 (07 Dec 2020 08:00) (62 - 107)  RR: 24 (07 Dec 2020 09:00) (18 - 44)  SpO2: 92% (07 Dec 2020 09:04) (81% - 94%)      Constitutional:      HEENT:    Neck:  No JVD, bruits or thyromegaly    Respiratory:  Clear without rales or rhonchi    Cardiovascular:  RR without murmur, rub or gallop.    Gastrointestinal: Soft without hepatosplenomegaly.    Extremities: without cyanosis, clubbing or edema.    Neurological:  Oriented   x   3   . No gross sensory or motor defects.        LABS:                        14.6   14.91 )-----------( 329      ( 07 Dec 2020 05:35 )             45.4     12-07    133<L>  |  101  |  18  ----------------------------<  170<H>  5.3   |  23  |  0.56    Ca    10.5      07 Dec 2020 05:35  Phos  2.6     12-07  Mg     2.2     12-07    TPro  7.3  /  Alb  2.1<L>  /  TBili  0.7  /  DBili  x   /  AST  91<H>  /  ALT  398<H>  /  AlkPhos  296<H>  12-07      Parathyroid Hormone Intact, Serum (12.04.20 @ 14:34)    Calcium.: 10.8: Test Repeated mg/dL    Intact PTH: 78: PTH METHOD: Roche  Guide for Interpretation of PTH and Calcium Results                                   CAPILLARY BLOOD GLUCOSE          RADIOLOGY & ADDITIONAL STUDIES:

## 2020-12-07 NOTE — PROGRESS NOTE ADULT - SUBJECTIVE AND OBJECTIVE BOX
Patient is a 60y old  Female who presents with a chief complaint of COVID PNA requiring oxygen supplementation (07 Dec 2020 10:16)  Patientt is awake, alert, comfortable out of bed in NAD. Remains on high flow oxygen supp    INTERVAL HPI/OVERNIGHT EVENTS:      VITAL SIGNS:  T(F): 98.8 (12-06-20 @ 20:00)  HR: 84 (12-07-20 @ 11:00)  BP: 115/70 (12-07-20 @ 11:00)  RR: 23 (12-07-20 @ 11:00)  SpO2: 92% (12-07-20 @ 11:00)  Wt(kg): --  I&O's Detail    06 Dec 2020 07:01  -  07 Dec 2020 07:00  --------------------------------------------------------  IN:    IV PiggyBack: 250 mL    Oral Fluid: 370 mL  Total IN: 620 mL    OUT:    Voided (mL): 1730 mL  Total OUT: 1730 mL    Total NET: -1110 mL              REVIEW OF SYSTEMS:    CONSTITUTIONAL:  No fevers, chills, sweats    HEENT:  Eyes:  No diplopia or blurred vision. ENT:  No earache, sore throat or runny nose.    CARDIOVASCULAR:  No pressure, squeezing, tightness, or heaviness about the chest; no palpitations.    RESPIRATORY:  Per HPI    GASTROINTESTINAL:  No abdominal pain, nausea, vomiting or diarrhea.    GENITOURINARY:  No dysuria, frequency or urgency.    NEUROLOGIC:  No paresthesias, fasciculations, seizures or weakness.    PSYCHIATRIC:  No disorder of thought or mood.      PHYSICAL EXAM:    Constitutional: Well developed and nourished  Eyes:Perrla  ENMT: normal  Neck:supple  Respiratory: good air entry  Cardiovascular: S1 S2 regular  Gastrointestinal: Soft, Non tender  Extremities: No edema  Vascular:normal  Neurological:Awake, alert,Ox3  Musculoskeletal:Normal      MEDICATIONS  (STANDING):  ALBUTerol    90 MICROgram(s) HFA Inhaler 2 Puff(s) Inhalation every 6 hours  ALPRAZolam 1 milliGRAM(s) Oral every 12 hours  chlorhexidine 2% Cloths 1 Application(s) Topical <User Schedule>  dexAMETHasone     Tablet 6 milliGRAM(s) Oral daily  enoxaparin Injectable 40 milliGRAM(s) SubCutaneous daily  levothyroxine 100 MICROGram(s) Oral daily  pantoprazole    Tablet 40 milliGRAM(s) Oral before breakfast  polyethylene glycol 3350 17 Gram(s) Oral daily  senna 2 Tablet(s) Oral at bedtime    MEDICATIONS  (PRN):  acetaminophen   Tablet .. 650 milliGRAM(s) Oral every 6 hours PRN Temp greater or equal to 38C (100.4F), Mild Pain (1 - 3)  morphine  - Injectable 1 milliGRAM(s) IV Push every 8 hours PRN Respiratory distress      Allergies    No Known Allergies    Intolerances        LABS:                        14.6   14.91 )-----------( 329      ( 07 Dec 2020 05:35 )             45.4     12-07    133<L>  |  101  |  18  ----------------------------<  170<H>  5.3   |  23  |  0.56    Ca    10.5      07 Dec 2020 05:35  Phos  2.6     12-07  Mg     2.2     12-07    TPro  7.3  /  Alb  2.1<L>  /  TBili  0.7  /  DBili  x   /  AST  91<H>  /  ALT  398<H>  /  AlkPhos  296<H>  12-07        ABG - ( 06 Dec 2020 14:12 )  pH, Arterial: 7.45  pH, Blood: x     /  pCO2: 38    /  pO2: 55    / HCO3: 27    / Base Excess: 3.1   /  SaO2: 88                    CAPILLARY BLOOD GLUCOSE        pro-bnp -- 12-05 @ 15:42     d-dimer <150  12-05 @ 15:42  pro-bnp -- 12-01 @ 11:26     d-dimer <150  12-01 @ 11:26      RADIOLOGY & ADDITIONAL TESTS:    CXR:  < from: Xray Chest 1 View- PORTABLE-Routine (Xray Chest 1 View- PORTABLE-Routine in AM.) (12.05.20 @ 10:43) >  IMPRESSION: No interval change . Diffuse bilateral perihilar peripheral airspace disease..    < end of copied text >    Ct scan chest:    ekg;    echo:

## 2020-12-07 NOTE — PROGRESS NOTE ADULT - SUBJECTIVE AND OBJECTIVE BOX
ICU VISIT  60y Female    Meds:    Allergies    No Known Allergies    Intolerances        VITALS:  Vital Signs Last 24 Hrs  T(C): 36.4 (07 Dec 2020 16:05), Max: 37.1 (06 Dec 2020 20:00)  T(F): 97.6 (07 Dec 2020 16:05), Max: 98.8 (06 Dec 2020 20:00)  HR: 71 (07 Dec 2020 18:00) (67 - 96)  BP: 103/80 (07 Dec 2020 18:00) (103/80 - 156/69)  BP(mean): 86 (07 Dec 2020 18:00) (59 - 107)  RR: 23 (07 Dec 2020 18:00) (17 - 44)  SpO2: 92% (07 Dec 2020 18:00) (81% - 94%)    LABS/DIAGNOSTIC TESTS:                          14.0   15.77 )-----------( 483      ( 07 Dec 2020 15:43 )             43.2         12-07    133<L>  |  101  |  18  ----------------------------<  170<H>  5.3   |  23  |  0.56    Ca    10.5      07 Dec 2020 05:35  Phos  2.6     12-07  Mg     2.2     12-07    TPro  7.3  /  Alb  2.1<L>  /  TBili  0.7  /  DBili  x   /  AST  91<H>  /  ALT  398<H>  /  AlkPhos  296<H>  12-07      LIVER FUNCTIONS - ( 07 Dec 2020 05:35 )  Alb: 2.1 g/dL / Pro: 7.3 g/dL / ALK PHOS: 296 U/L / ALT: 398 U/L DA / AST: 91 U/L / GGT: x             CULTURES:       RADIOLOGY:< from: Xray Chest 1 View- PORTABLE-Routine (Xray Chest 1 View- PORTABLE-Routine in AM.) (12.07.20 @ 09:40) >  EXAM:  XR CHEST PORTABLE ROUTINE 1V                            PROCEDURE DATE:  12/07/2020          INTERPRETATION:  Chest one view    HISTORY: COVID-19 pneumonia    COMPARISON STUDY: 12/5/2020    Frontal expiratory view of the chest shows the heart to be similar in size. The lungs show progression of bilateral pulmonary infiltrates and there is no evidence of pneumothorax nor pleural effusion.    IMPRESSION:  Progression of infiltrates.    Thank you for the courtesy of this referral.            MICHELLE ARTHUR MD; Attending Interventional Radiologist  This document has been electronically signed. Dec  7 2020 12:52PM    < end of copied text >        ROS:  [  ] UNABLE TO ELICIT ICU VISIT  60y Female who remains in the ICU, she is still SOB and drops her sao2 at times but refuses to prone or listen to the advice of the medical team. She c/o a slight cough, no chest pain, no N,V or diarrhea and no fevers, she remains on high flow oxygen at 40 liters /min. Her LFTs are decreasing.     Meds:    Allergies    No Known Allergies    Intolerances        VITALS:  Vital Signs Last 24 Hrs  T(C): 36.4 (07 Dec 2020 16:05), Max: 37.1 (06 Dec 2020 20:00)  T(F): 97.6 (07 Dec 2020 16:05), Max: 98.8 (06 Dec 2020 20:00)  HR: 71 (07 Dec 2020 18:00) (67 - 96)  BP: 103/80 (07 Dec 2020 18:00) (103/80 - 156/69)  BP(mean): 86 (07 Dec 2020 18:00) (59 - 107)  RR: 23 (07 Dec 2020 18:00) (17 - 44)  SpO2: 92% (07 Dec 2020 18:00) (81% - 94%)    LABS/DIAGNOSTIC TESTS:                          14.0   15.77 )-----------( 483      ( 07 Dec 2020 15:43 )             43.2         12-07    133<L>  |  101  |  18  ----------------------------<  170<H>  5.3   |  23  |  0.56    Ca    10.5      07 Dec 2020 05:35  Phos  2.6     12-07  Mg     2.2     12-07    TPro  7.3  /  Alb  2.1<L>  /  TBili  0.7  /  DBili  x   /  AST  91<H>  /  ALT  398<H>  /  AlkPhos  296<H>  12-07      LIVER FUNCTIONS - ( 07 Dec 2020 05:35 )  Alb: 2.1 g/dL / Pro: 7.3 g/dL / ALK PHOS: 296 U/L / ALT: 398 U/L DA / AST: 91 U/L / GGT: x             CULTURES:       RADIOLOGY:< from: Xray Chest 1 View- PORTABLE-Routine (Xray Chest 1 View- PORTABLE-Routine in AM.) (12.07.20 @ 09:40) >  EXAM:  XR CHEST PORTABLE ROUTINE 1V                            PROCEDURE DATE:  12/07/2020          INTERPRETATION:  Chest one view    HISTORY: COVID-19 pneumonia    COMPARISON STUDY: 12/5/2020    Frontal expiratory view of the chest shows the heart to be similar in size. The lungs show progression of bilateral pulmonary infiltrates and there is no evidence of pneumothorax nor pleural effusion.    IMPRESSION:  Progression of infiltrates.    Thank you for the courtesy of this referral.            MICHELLE ARTHUR MD; Attending Interventional Radiologist  This document has been electronically signed. Dec  7 2020 12:52PM    < end of copied text >        ROS:  [  ] UNABLE TO ELICIT

## 2020-12-07 NOTE — PROGRESS NOTE ADULT - ASSESSMENT
COVID -19 infection /pneumonia  Hypoxia  Fevers - resolved  Leukocytosis - from steroids  Transaminitis - improving, likely from Remdesivir, will monitor    Plan - Got Convalescent Plasma   Cont Decadron 6mgs iv q24hrs  Completed  Remdesivir    time spent - 30 mins.

## 2020-12-07 NOTE — CONSULT NOTE ADULT - ASSESSMENT
61 yo F from home lives with son (who is autistic, covid +) PMHx of hypothyroid, sleep apnea, PUD, cervical OA PSH tracheal resection and reconstruction, uterous myoma, HTN,  presents to the ED c/o shortness of breath x 2 days. Admitted with COVID PNA. Found to have hypercalcemia. Pt has hx of hyperparathyroidism s/p ? parathyroidectomy.

## 2020-12-07 NOTE — PROGRESS NOTE ADULT - MS EXT PE MLT D E PC
no cyanosis/pedal edema
no cyanosis/no pedal edema
no cyanosis/pedal edema
no cyanosis/no pedal edema
no cyanosis/no pedal edema
pedal edema/no cyanosis

## 2020-12-07 NOTE — PROGRESS NOTE ADULT - GASTROINTESTINAL DETAILS
no rigidity/no organomegaly/no guarding/soft/no masses palpable/bowel sounds normal/no distention/nontender
bowel sounds normal/no organomegaly/nontender/no distention/soft/no masses palpable/no guarding/no rigidity
nontender/no rigidity/bowel sounds normal/no guarding/no masses palpable/no distention/soft/no organomegaly
no masses palpable/bowel sounds normal/nontender/no distention/no rigidity/soft/no guarding/no organomegaly
no rigidity/bowel sounds normal/no guarding/no distention/nontender/soft
no rigidity/no organomegaly/nontender/no guarding/no distention/no masses palpable/bowel sounds normal/soft

## 2020-12-08 NOTE — PROGRESS NOTE ADULT - SUBJECTIVE AND OBJECTIVE BOX
Patient is a 60y old  Female who presents with a chief complaint of COVID PNA requiring oxygen supplementation (07 Dec 2020 18:40)    pt seen in icu [  ], reg med floor [ x  ], bed [ x ], chair at bedside [   ], a+o x3 [ x ], lethargic [  ],    nad [x  ]      Allergies    No Known Allergies        Vitals    T(F): 97.2 (12-08-20 @ 04:43), Max: 97.6 (12-07-20 @ 09:00)  HR: 66 (12-08-20 @ 06:00) (64 - 84)  BP: 117/57 (12-08-20 @ 06:00) (99/63 - 134/76)  RR: 46 (12-08-20 @ 06:00) (17 - 46)  SpO2: 82% (12-08-20 @ 06:00) (79% - 96%)  Wt(kg): --  CAPILLARY BLOOD GLUCOSE      POCT Blood Glucose.: 96 mg/dL (08 Dec 2020 05:16)      Labs                          14.8   14.61 )-----------( 493      ( 08 Dec 2020 06:11 )             46.4       12-07    134<L>  |  98  |  20<H>  ----------------------------<  281<H>  4.9   |  29  |  0.66    Ca    11.0<H>      07 Dec 2020 22:53  Phos  2.6     12-07  Mg     2.2     12-07    TPro  7.3  /  Alb  2.1<L>  /  TBili  0.7  /  DBili  x   /  AST  91<H>  /  ALT  398<H>  /  AlkPhos  296<H>  12-07                Radiology Results      Meds    MEDICATIONS  (STANDING):  ALBUTerol    90 MICROgram(s) HFA Inhaler 2 Puff(s) Inhalation every 6 hours  ALPRAZolam 1 milliGRAM(s) Oral every 12 hours  chlorhexidine 2% Cloths 1 Application(s) Topical <User Schedule>  dexAMETHasone     Tablet 6 milliGRAM(s) Oral daily  enoxaparin Injectable 40 milliGRAM(s) SubCutaneous daily  levothyroxine 100 MICROGram(s) Oral daily  pantoprazole    Tablet 40 milliGRAM(s) Oral before breakfast  polyethylene glycol 3350 17 Gram(s) Oral daily  senna 2 Tablet(s) Oral at bedtime      MEDICATIONS  (PRN):  acetaminophen   Tablet .. 650 milliGRAM(s) Oral every 6 hours PRN Temp greater or equal to 38C (100.4F), Mild Pain (1 - 3)  morphine  - Injectable 2 milliGRAM(s) IV Push every 4 hours PRN respiratory distress      Physical Exam    Neuro :  no focal deficits  Respiratory: CTA B/L  CV: RRR, S1S2, no murmurs,   Abdominal: Soft, NT, ND +BS,  Extremities: No edema, + peripheral pulses          ASSESSMENT    Infection due to severe acute respiratory syndrome coronavirus 2 (SARS-CoV-2)  pneumonia,   hypoxia,   h/o hypothyroid,   sleep apnea,   PUD,   cervical OA   tracheal resection and reconstruction,   uterus myoma,   HTN,   gi bleed  Osteoporosis  Hyperparathyroidism      PLAN    id f/u   Cont Decadron 6mgs iv q24hrs  completed Remdesivir 12/06/20  s/p convalescent plasma  cont decadron,   cont contact and airborne isolation,   cont albuterol inhaler,   cont tylenol prn, robitussin prn   pulm f/u   O2 sat (81% - 100%) on high flow   abg noted above with hypoxia  cont high flow O2 via nasal canula as needed,   pt afebrile  dvt prophylaxis  covid-19 antibody test neg,   resp viral panel positive for covid 19,   procalcitonin, D-dimer wnl noted     esr, crp, ldh, ferritin elevated noted    endo cons  cont current meds  mgmt as per icu         Patient is a 60y old  Female who presents with a chief complaint of COVID PNA requiring oxygen supplementation (07 Dec 2020 18:40)    pt seen in icu [ x ], reg med floor [   ], bed [ x ], chair at bedside [   ], a+o x3 [ x ], lethargic [  ],    nad [x  ]    bipap [x]      Allergies    No Known Allergies        Vitals    T(F): 97.2 (12-08-20 @ 04:43), Max: 97.6 (12-07-20 @ 09:00)  HR: 66 (12-08-20 @ 06:00) (64 - 84)  BP: 117/57 (12-08-20 @ 06:00) (99/63 - 134/76)  RR: 46 (12-08-20 @ 06:00) (17 - 46)  SpO2: 82% (12-08-20 @ 06:00) (79% - 96%)  Wt(kg): --  CAPILLARY BLOOD GLUCOSE      POCT Blood Glucose.: 96 mg/dL (08 Dec 2020 05:16)      Labs                          14.8   14.61 )-----------( 493      ( 08 Dec 2020 06:11 )             46.4       12-07    134<L>  |  98  |  20<H>  ----------------------------<  281<H>  4.9   |  29  |  0.66    Ca    11.0<H>      07 Dec 2020 22:53  Phos  2.6     12-07  Mg     2.2     12-07    TPro  7.3  /  Alb  2.1<L>  /  TBili  0.7  /  DBili  x   /  AST  91<H>  /  ALT  398<H>  /  AlkPhos  296<H>  12-07                Radiology Results      Meds    MEDICATIONS  (STANDING):  ALBUTerol    90 MICROgram(s) HFA Inhaler 2 Puff(s) Inhalation every 6 hours  ALPRAZolam 1 milliGRAM(s) Oral every 12 hours  chlorhexidine 2% Cloths 1 Application(s) Topical <User Schedule>  dexAMETHasone     Tablet 6 milliGRAM(s) Oral daily  enoxaparin Injectable 40 milliGRAM(s) SubCutaneous daily  levothyroxine 100 MICROGram(s) Oral daily  pantoprazole    Tablet 40 milliGRAM(s) Oral before breakfast  polyethylene glycol 3350 17 Gram(s) Oral daily  senna 2 Tablet(s) Oral at bedtime      MEDICATIONS  (PRN):  acetaminophen   Tablet .. 650 milliGRAM(s) Oral every 6 hours PRN Temp greater or equal to 38C (100.4F), Mild Pain (1 - 3)  morphine  - Injectable 2 milliGRAM(s) IV Push every 4 hours PRN respiratory distress      Physical Exam    Neuro :  no focal deficits  Respiratory: CTA B/L  CV: RRR, S1S2, no murmurs,   Abdominal: Soft, NT, ND +BS,  Extremities: No edema, + peripheral pulses          ASSESSMENT    Infection due to severe acute respiratory syndrome coronavirus 2 (SARS-CoV-2)  pneumonia,   hypoxia,   h/o hypothyroid,   sleep apnea,   PUD,   cervical OA   tracheal resection and reconstruction,   uterus myoma,   HTN,   gi bleed  Osteoporosis  Hyperparathyroidism      PLAN    id f/u   Cont Decadron 6mgs iv q24hrs  completed Remdesivir 12/06/20  s/p convalescent plasma  cont decadron,   cont contact and airborne isolation,   cont albuterol inhaler,   cont tylenol prn, robitussin prn   pulm f/u   O2 sat (79% - 96%) on bipap   cont bipap as needed,   pt afebrile  dvt prophylaxis  covid-19 antibody test neg,   resp viral panel positive for covid 19,   procalcitonin, D-dimer wnl noted     esr, crp, ldh, ferritin elevated noted    endo cons noted   hypercalcemia due to hyperparathyroidism  high vitamin d 1-25 ? etiology-   hx of parathyroid surgery r/o pth hyperplasia  cont to monitor calcium  keep pt hydrated   consider sensipar vs neck reexploration when stable as out pt  cont current meds  mgmt as per icu

## 2020-12-08 NOTE — PROGRESS NOTE ADULT - ASSESSMENT
ASSESSMENT AND PLAN:  59 yo F from home lives with son (who is autistic, covid +) PMHx of hypothyroid, sleep apnea, HTN PUD, cervical OA PSH tracheal resection and reconstruction, uterous myoma, presents to the ED c/o shortness of breath x 2 days. Patient underwent sleep studyfrom 11/20 to 11/21 ,diagnosed with DARRELL, spiked fever the day after 101.6 associated with headache, and later on cough and diarrhea and tested positive for COVID on 111/23rd. pt has been having fever and diarrhea since Dx. Pt notes onset of  worsening shortness of breath  for past 2 days.  Patient denies vomiting, rash , joint pain or any other acute complaints. Pt started ABx likley amoxicillin (does not remember te name ) and Decadron 6mg X5 days.    Of note : pt reports h/o massive GIB resulted in emergency intubation for 5 days. Pt reports her trachea was damaged as a result of intubation. s/p tracheal resection and reconstruction April 2016. She has undergone multiple surveillance bronchoscopies,    ED course : patient saturated on high 80s on RA on arrival , was placed on NRB and descalated to NC 4 liters, pt saturated 96% on 4L NC,  on my evaluation patient was saturating 97% on 2 L NC however tachypneic while talking or getting exited.  CXR : patchy basilar infiltrates   (01 Dec 2020 13:01)  Pt was saturating 93% on 100% NRB, transferred to ICU for high flow oxygen therapy.     Assessment:   Acute hypoxic respiratory failure due to COVID PNA   Sleep Apnea,   PUD, hypothyroidism     === Neuro===  Alert and oriented x 3    #Anxiety  - added Xanax 0.5mg BID as spO2 drops when pt becomes anxious     #Central sleep apnea?  Called her neurologist Dr. Greg Moreno [phone] for details, but no answer , Will defer it for outpatient     === Cardiovascular===  HTN: not on any medication at home   - continue to monitor for now.     ===- Pulm===  Acute hypoxic respiratory failure due to COVID PNA   c/w Decadron, Remedesivir,   - s/p Rocephin and Azith 12/1-3, Dcd as CXR consistent with Covid PNA  - On high flow 97% 55L, Patient's work of breathing is istable- s/p plasma 12/4 as per pt's request. Explained the study doesn't show benefit  - Patient is on BIPAP at night 12/6  - ID Dr Nelson  - Lovenox DVT proph 40 mg daily as D-Dimer is <150   - Follow inflammatory markers on alternate days     #sleep Apnea : pt recently had Sleep study done and diagnosed with Sleep apnea on Nov 20 but not started on CPAP yet   OP follow up     ====ID===  PNA: management as above     === Nephro===  no active issues     ===GI===  #Elevated LFTs  - LFTs are trending up  - likely from covid  - Hepatitis C panel negative, Hepatitis B pending   - monitor LFTs    #Hx of PUD:   - c/w Protonix     === Heme===  #Leukocytosis   - Likely due to covid infection  - monitor CBC    ===Endocrine===  #hypothyroidism   - TSH low 0.14  - T3  48, Free Thyroxine 1.3  - c/w Levothyroxine     #Hypercalcemia  - Elevated calcium 11, vit D-25 WNL,  vitamin D, 1-25 high, PTH: high   - f/u PTHrp    === Skin/Catheters===  No rashes. Peripheral IV lines.     ===Prophylaxis ===  LOVenox 40mg daily for DVT proph   Protonix for  GI prophylaxis    ===GOC===  DNR/DNI, discussed with pt and daughter over phone and MOLST form signed and placed in the chart.     ===Disposition===  Monitor in ICU   ASSESSMENT AND PLAN:  59 yo F from home lives with son (who is autistic, covid +) PMHx of hypothyroid, sleep apnea, HTN PUD, cervical OA PSH tracheal resection and reconstruction, uterous myoma, presents to the ED c/o shortness of breath x 2 days. Patient underwent sleep studyfrom 11/20 to 11/21 ,diagnosed with DARRELL, spiked fever the day after 101.6 associated with headache, and later on cough and diarrhea and tested positive for COVID on 111/23rd. pt has been having fever and diarrhea since Dx. Pt notes onset of  worsening shortness of breath  for past 2 days.  Patient denies vomiting, rash , joint pain or any other acute complaints. Pt started ABx likley amoxicillin (does not remember te name ) and Decadron 6mg X5 days.    Of note : pt reports h/o massive GIB resulted in emergency intubation for 5 days. Pt reports her trachea was damaged as a result of intubation. s/p tracheal resection and reconstruction April 2016. She has undergone multiple surveillance bronchoscopies,    ED course : patient saturated on high 80s on RA on arrival , was placed on NRB and descalated to NC 4 liters, pt saturated 96% on 4L NC,  on my evaluation patient was saturating 97% on 2 L NC however tachypneic while talking or getting exited.  CXR : patchy basilar infiltrates   (01 Dec 2020 13:01)  Pt was saturating 93% on 100% NRB, transferred to ICU for high flow oxygen therapy.     Assessment:   Acute hypoxic respiratory failure due to COVID PNA   Sleep Apnea,   PUD, hypothyroidism     === Neuro===  Alert and oriented x 3    #Anxiety  - added Xanax 0.5mg BID as spO2 drops when pt becomes anxious     #Central sleep apnea?  Called her neurologist Dr. Greg Moreno [phone] for details, but no answer , Will defer it for outpatient     === Cardiovascular===  HTN: not on any medication at home   - continue to monitor for now.     ===- Pulm===  Acute hypoxic respiratory failure due to COVID PNA   c/w Decadron, Remedesivir,   - s/p Rocephin and Azith 12/1-3, Dcd as CXR consistent with Covid PNA  - On high flow 97% 55L, Patient's work of breathing is istable- s/p plasma 12/4 as per pt's request. Explained the study doesn't show benefit  - Patient is on BIPAP but she takes it off because of anxiety. Will add NRB with high flow,   - ID Dr Nelson  - Lovenox DVT proph 40 mg daily as D-Dimer is <150   - Follow inflammatory markers on alternate days     #sleep Apnea : pt recently had Sleep study done and diagnosed with Sleep apnea on Nov 20 but not started on CPAP yet   OP follow up     ====ID===  PNA: management as above     === Nephro===  no active issues     ===GI===  #Elevated LFTs  - LFTs are trending up  - likely from covid  - Hepatitis C panel negative, Hepatitis B pending   - monitor LFTs    #Hx of PUD:   - c/w Protonix     === Heme===  #Leukocytosis   - Likely due to covid infection  - monitor CBC    ===Endocrine===  #hypothyroidism   - TSH low 0.14  - T3  48, Free Thyroxine 1.3  - c/w Levothyroxine     #Hypercalcemia  - Elevated calcium 11, vit D-25 WNL,  vitamin D, 1-25 high, PTH: high   - Will repeat 1-25 OH as per Endo recs   - f/u PTHrp    === Skin/Catheters===  No rashes. Peripheral IV lines.     ===Prophylaxis ===  LOVenox 40mg daily for DVT proph   Protonix for  GI prophylaxis    ===GOC===  DNR/DNI, discussed with pt and daughter over phone and MOLST form signed and placed in the chart.     ===Disposition===  Monitor in ICU

## 2020-12-08 NOTE — PROGRESS NOTE ADULT - SUBJECTIVE AND OBJECTIVE BOX
Patient is a 60y old  Female who presents with a chief complaint of COVID PNA requiring oxygen supplementation (08 Dec 2020 10:29)  Awake, alert, laying  in bed in NAD. Still with high flow oxygen supp. Oxygen sat 86%    INTERVAL HPI/OVERNIGHT EVENTS:      VITAL SIGNS:  T(F): 97.6 (12-08-20 @ 08:00)  HR: 78 (12-08-20 @ 10:00)  BP: 122/73 (12-08-20 @ 10:00)  RR: 20 (12-08-20 @ 10:00)  SpO2: 86% (12-08-20 @ 10:00)  Wt(kg): --  I&O's Detail    07 Dec 2020 07:01  -  08 Dec 2020 07:00  --------------------------------------------------------  IN:    IV PiggyBack: 50 mL    Oral Fluid: 360 mL  Total IN: 410 mL    OUT:    Voided (mL): 1800 mL  Total OUT: 1800 mL    Total NET: -1390 mL              REVIEW OF SYSTEMS:    CONSTITUTIONAL:  No fevers, chills, sweats    HEENT:  Eyes:  No diplopia or blurred vision. ENT:  No earache, sore throat or runny nose.    CARDIOVASCULAR:  No pressure, squeezing, tightness, or heaviness about the chest; no palpitations.    RESPIRATORY:  Per HPI    GASTROINTESTINAL:  No abdominal pain, nausea, vomiting or diarrhea.    GENITOURINARY:  No dysuria, frequency or urgency.    NEUROLOGIC:  No paresthesias, fasciculations, seizures or weakness.    PSYCHIATRIC:  No disorder of thought or mood.      PHYSICAL EXAM:    Constitutional: Well developed and nourished  Eyes:Perrla  ENMT: normal  Neck:supple  Respiratory: good air entry  Cardiovascular: S1 S2 regular  Gastrointestinal: Soft, Non tender  Extremities: No edema  Vascular:normal  Neurological:Awake, alert,Ox3  Musculoskeletal:Normal      MEDICATIONS  (STANDING):  ALBUTerol    90 MICROgram(s) HFA Inhaler 2 Puff(s) Inhalation every 6 hours  ALPRAZolam 1 milliGRAM(s) Oral every 12 hours  chlorhexidine 2% Cloths 1 Application(s) Topical <User Schedule>  dexAMETHasone     Tablet 6 milliGRAM(s) Oral daily  dextrose 5%. 1000 milliLiter(s) (100 mL/Hr) IV Continuous <Continuous>  dextrose 50% Injectable 25 Gram(s) IV Push once  dextrose 50% Injectable 12.5 Gram(s) IV Push once  enoxaparin Injectable 40 milliGRAM(s) SubCutaneous daily  glucagon  Injectable 1 milliGRAM(s) IntraMuscular once  insulin lispro (ADMELOG) corrective regimen sliding scale   SubCutaneous three times a day before meals  levothyroxine 100 MICROGram(s) Oral daily  pamidronate IVPB 90 milliGRAM(s) IV Intermittent once  pantoprazole    Tablet 40 milliGRAM(s) Oral before breakfast  polyethylene glycol 3350 17 Gram(s) Oral daily  senna 2 Tablet(s) Oral at bedtime    MEDICATIONS  (PRN):  acetaminophen   Tablet .. 650 milliGRAM(s) Oral every 6 hours PRN Temp greater or equal to 38C (100.4F), Mild Pain (1 - 3)  morphine  - Injectable 2 milliGRAM(s) IV Push every 4 hours PRN respiratory distress      Allergies    No Known Allergies    Intolerances        LABS:                        14.8   14.61 )-----------( 493      ( 08 Dec 2020 06:11 )             46.4     12-08    133<L>  |  97  |  20<H>  ----------------------------<  231<H>  4.5   |  28  |  0.61    Ca    11.4<H>      08 Dec 2020 06:11  Phos  2.4     12-08  Mg     2.5     12-08    TPro  7.7  /  Alb  2.3<L>  /  TBili  0.3  /  DBili  x   /  AST  32  /  ALT  272<H>  /  AlkPhos  247<H>  12-08        ABG - ( 06 Dec 2020 14:12 )  pH, Arterial: 7.45  pH, Blood: x     /  pCO2: 38    /  pO2: 55    / HCO3: 27    / Base Excess: 3.1   /  SaO2: 88                    CAPILLARY BLOOD GLUCOSE      POCT Blood Glucose.: 275 mg/dL (08 Dec 2020 11:10)  POCT Blood Glucose.: 96 mg/dL (08 Dec 2020 05:16)  POCT Blood Glucose.: 280 mg/dL (07 Dec 2020 14:41)    pro-bnp -- 12-05 @ 15:42     d-dimer <150  12-05 @ 15:42      RADIOLOGY & ADDITIONAL TESTS:    CXR:  < from: Xray Chest 1 View- PORTABLE-Routine (Xray Chest 1 View- PORTABLE-Routine in AM.) (12.07.20 @ 09:40) >  IMPRESSION:  Progression of infiltrates.    < end of copied text >    Ct scan chest:    ekg;    echo:

## 2020-12-08 NOTE — PROGRESS NOTE ADULT - SUBJECTIVE AND OBJECTIVE BOX
Interval Events:  pt in nad    Allergies    No Known Allergies    Intolerances      Endocrine/Metabolic Medications:  dexAMETHasone     Tablet 6 milliGRAM(s) Oral daily  dextrose 50% Injectable 25 Gram(s) IV Push once  dextrose 50% Injectable 12.5 Gram(s) IV Push once  glucagon  Injectable 1 milliGRAM(s) IntraMuscular once  insulin lispro (ADMELOG) corrective regimen sliding scale   SubCutaneous three times a day before meals  levothyroxine 100 MICROGram(s) Oral daily      Vital Signs Last 24 Hrs  T(C): 36.4 (08 Dec 2020 08:00), Max: 36.4 (07 Dec 2020 16:05)  T(F): 97.6 (08 Dec 2020 08:00), Max: 97.6 (07 Dec 2020 16:05)  HR: 80 (08 Dec 2020 09:39) (64 - 84)  BP: 106/83 (08 Dec 2020 09:00) (99/63 - 134/76)  BP(mean): 87 (08 Dec 2020 09:00) (70 - 107)  RR: 36 (08 Dec 2020 09:00) (17 - 46)  SpO2: 88% (08 Dec 2020 09:39) (79% - 96%)      PHYSICAL EXAM  All physical exam findings normal, except those marked:  General:	Alert, active, cooperative, NAD, well hydrated  .		[] Abnormal:  Neck		Normal: supple, no cervical adenopathy, no palpable thyroid  .		[] Abnormal:  Cardiovascular	Normal: regular rate, normal S1, S2, no murmurs  .		[] Abnormal:  Respiratory	Normal: no chest wall deformity, normal respiratory pattern, CTA B/L  .		[] Abnormal:  Abdominal	Normal: soft, ND, NT, bowel sounds present, no masses, no organomegaly  .		[] Abnormal:  		Normal normal genitalia, testes descended, circumcised/uncircumcised  .		Lenore stage:			Breast lenore:  .		Menstrual history:  .		[] Abnormal:  Extremities	Normal: FROM x4  .		[] Abnormal:  Skin		Normal: intact and not indurated, no rash, no acanthosis nigricans  .		[] Abnormal:  Neurologic	Normal: grossly intact  .		[] Abnormal:    LABS                        14.8   14.61 )-----------( 493      ( 08 Dec 2020 06:11 )             46.4                               133    |  97     |  20                  Calcium: 11.4  / iCa: x      (12-08 @ 06:11)    ----------------------------<  231       Magnesium: 2.5                              4.5     |  28     |  0.61             Phosphorous: 2.4      TPro  7.7    /  Alb  2.3    /  TBili  0.3    /  DBili  x      /  AST  32     /  ALT  272    /  AlkPhos  247    08 Dec 2020 06:11    CAPILLARY BLOOD GLUCOSE      POCT Blood Glucose.: 96 mg/dL (08 Dec 2020 05:16)  POCT Blood Glucose.: 280 mg/dL (07 Dec 2020 14:41)        Assesment/plan    59 yo F from home lives with son (who is autistic, covid +) PMHx of hypothyroid, sleep apnea, PUD, cervical OA PSH tracheal resection and reconstruction, uterous myoma, HTN,  presents to the ED c/o shortness of breath x 2 days. Admitted with COVID PNA. Found to have hypercalcemia. Pt has hx of hyperparathyroidism s/p ? parathyroidectomy.       Problem/Recommendation - 1:  Problem: Hypercalcemia. due to hyperparathyroidism  high vitamin d 1-25 ? etiology- repeat levels today   hx of parathyroid surgery r/o pth hyperplasia  worsening hypercalcemia - give one dose of iv aredia 90mg   cont to monitor calcium  d/w hs       Problem/Recommendation - 2:  ·  Problem: Hypothyroidism.  Recommendation: cont lt4 100 mcg  low tsh likely due to steroids.      Problem/Recommendation - 3:  ·  Problem: COVID-19 virus infection.  Recommendation: cont tx per icu team.

## 2020-12-08 NOTE — PROGRESS NOTE ADULT - ASSESSMENT
1. PNA 2nd to Covid-19  - PCR positive. IGG Negative.   - CXR noted.  - Continue abx  - Continue Vit C, Vit D, Zinc   - Continue Dexamethasone   - Completed Remdesivir   - Continue Singulair and Pepcid   - Robitussin PRN for cough  - Tylenol PRN for temp   - O2 Supp - HFNC; low threshold for BIPAP.  - ICU management.   - Monitor O2 Sat.  - Monitor LFTs, LDH, D-Dimer, Ferritin, CRP and procalcitonin  - F.U CXR  - Prone positioning as tolerated   - DVT and GI PPX     2. DARRELL   - Newly diagnosed.   - Currently being treated for Covid-19.    3. Gastric Ulcer  - Stool Studies  - Monitor labs  - Continue meds  - GI eval.     4. Hx of Intubation   - S/P intubation x 5 days in 2016 for GIB  - Trachea was damaged during intubation.  - S/P tracheal resection and reconstruction.   - S/P surveillance bronchoscopies   - Request resuscitation measures if needed but wants to be DNI

## 2020-12-08 NOTE — PROGRESS NOTE ADULT - SUBJECTIVE AND OBJECTIVE BOX
INTERVAL HPI/OVERNIGHT EVENTS: ***    PRESSORS: [ ] YES [ ] NO  WHICH:    ANTIBIOTICS:                  DATE STARTED:  ANTIBIOTICS:                  DATE STARTED:  ANTIBIOTICS:                  DATE STARTED:    Antimicrobial:    Cardiovascular:    Pulmonary:  ALBUTerol    90 MICROgram(s) HFA Inhaler 2 Puff(s) Inhalation every 6 hours    Hematalogic:  enoxaparin Injectable 40 milliGRAM(s) SubCutaneous daily    Other:  acetaminophen   Tablet .. 650 milliGRAM(s) Oral every 6 hours PRN  ALPRAZolam 1 milliGRAM(s) Oral every 12 hours  chlorhexidine 2% Cloths 1 Application(s) Topical <User Schedule>  dexAMETHasone     Tablet 6 milliGRAM(s) Oral daily  levothyroxine 100 MICROGram(s) Oral daily  morphine  - Injectable 2 milliGRAM(s) IV Push every 4 hours PRN  pantoprazole    Tablet 40 milliGRAM(s) Oral before breakfast  polyethylene glycol 3350 17 Gram(s) Oral daily  senna 2 Tablet(s) Oral at bedtime    acetaminophen   Tablet .. 650 milliGRAM(s) Oral every 6 hours PRN  ALBUTerol    90 MICROgram(s) HFA Inhaler 2 Puff(s) Inhalation every 6 hours  ALPRAZolam 1 milliGRAM(s) Oral every 12 hours  chlorhexidine 2% Cloths 1 Application(s) Topical <User Schedule>  dexAMETHasone     Tablet 6 milliGRAM(s) Oral daily  enoxaparin Injectable 40 milliGRAM(s) SubCutaneous daily  levothyroxine 100 MICROGram(s) Oral daily  morphine  - Injectable 2 milliGRAM(s) IV Push every 4 hours PRN  pantoprazole    Tablet 40 milliGRAM(s) Oral before breakfast  polyethylene glycol 3350 17 Gram(s) Oral daily  senna 2 Tablet(s) Oral at bedtime    Drug Dosing Weight  Height (cm): 162.6 (01 Dec 2020 09:53)  Weight (kg): 85.7 (01 Dec 2020 09:53)  BMI (kg/m2): 32.4 (01 Dec 2020 09:53)  BSA (m2): 1.91 (01 Dec 2020 09:53)    CENTRAL LINE: [ ] YES [ ] NO  LOCATION:   DATE INSERTED:  REMOVE: [ ] YES [ ] NO  EXPLAIN:    DONALD: [ ] YES [ ] NO    DATE INSERTED:  REMOVE:  [ ] YES [ ] NO  EXPLAIN:    A-LINE:  [ ] YES [ ] NO  LOCATION:   DATE INSERTED:  REMOVE:  [ ] YES [ ] NO  EXPLAIN:    PMH -reviewed admission note, no change since admission  PAST MEDICAL & SURGICAL HISTORY:  Lumbar back pain    Osteoporosis    History of tracheal stenosis    Other specified diseases of upper respiratory tract    Hypothyroidism    Kidney stones    Gallstones    Fatty liver    Thyroid disease    Obesity    Constipation    Gastric ulcer  February 2016- h/o GI bleed which patient had to be intubated Jan 2016    Arthritis of shoulder region, right    Arthritis  right shoulder    S/P bronchoscopy  2017    S/P bronchoscopy  5/16    Hyperparathyroidism  2012 parathyroidectomy    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    Gastric ulcer  &quot;four endoscopies&quot; for diagnosis of gastric ulcer in February 2016        ICU Vital Signs Last 24 Hrs  T(C): 36.2 (08 Dec 2020 04:43), Max: 36.4 (07 Dec 2020 09:00)  T(F): 97.2 (08 Dec 2020 04:43), Max: 97.6 (07 Dec 2020 09:00)  HR: 69 (08 Dec 2020 07:00) (64 - 84)  BP: 103/87 (08 Dec 2020 07:00) (99/63 - 134/76)  BP(mean): 91 (08 Dec 2020 07:00) (59 - 107)  ABP: --  ABP(mean): --  RR: 32 (08 Dec 2020 07:00) (17 - 46)  SpO2: 81% (08 Dec 2020 07:00) (79% - 96%)      ABG - ( 06 Dec 2020 14:12 )  pH, Arterial: 7.45  pH, Blood: x     /  pCO2: 38    /  pO2: 55    / HCO3: 27    / Base Excess: 3.1   /  SaO2: 88                    12-07 @ 07:01  -  12-08 @ 07:00  --------------------------------------------------------  IN: 410 mL / OUT: 1800 mL / NET: -1390 mL            PHYSICAL EXAM:    GENERAL: [ ]NAD, [ ]well-groomed, [ ]well-developed  HEAD:  [ ]Atraumatic, [ ]Normocephalic  EYES: [ ]EOMI, [ ]PERRLA, [ ]conjunctiva and sclera clear  ENMT: [ ]No tonsillar erythema, exudates, or enlargement; [ ]Moist mucous membranes, [ ]Good dentition, [ ]No lesions  NECK: [ ]Supple, normal appearance, [ ]No JVD; [ ]Normal thyroid; [ ]Trachea midline  NERVOUS SYSTEM:  [ ]Alert & Oriented X3, [ ]Good concentration; [ ]Motor Strength 5/5 B/L upper and lower extremities; [ ]DTRs 2+ intact and symmetric  CHEST/LUNG: [ ]No chest deformity; [ ]Normal percussion bilaterally; [ ]No rales, rhonchi, wheezing   HEART: [ ]Regular rate and rhythm; [ ]No murmurs, rubs, or gallops  ABDOMEN: [ ]Soft, Nontender, Nondistended; [ ]Bowel sounds present  EXTREMITIES:  [ ]2+ Peripheral Pulses, [ ]No clubbing, cyanosis, or edema  LYMPH: [ ]No lymphadenopathy noted  SKIN: [ ]No rashes or lesions; [ ]Good capillary refill      LABS:  CBC Full  -  ( 08 Dec 2020 06:11 )  WBC Count : 14.61 K/uL  RBC Count : 4.86 M/uL  Hemoglobin : 14.8 g/dL  Hematocrit : 46.4 %  Platelet Count - Automated : 493 K/uL  Mean Cell Volume : 95.5 fl  Mean Cell Hemoglobin : 30.5 pg  Mean Cell Hemoglobin Concentration : 31.9 gm/dL  Auto Neutrophil # : 13.23 K/uL  Auto Lymphocyte # : 0.74 K/uL  Auto Monocyte # : 0.47 K/uL  Auto Eosinophil # : 0.01 K/uL  Auto Basophil # : 0.02 K/uL  Auto Neutrophil % : 90.5 %  Auto Lymphocyte % : 5.1 %  Auto Monocyte % : 3.2 %  Auto Eosinophil % : 0.1 %  Auto Basophil % : 0.1 %    12-08    133<L>  |  97  |  20<H>  ----------------------------<  231<H>  4.5   |  28  |  0.61    Ca    11.4<H>      08 Dec 2020 06:11  Phos  2.4     12-08  Mg     2.5     12-08    TPro  7.7  /  Alb  2.3<L>  /  TBili  0.3  /  DBili  x   /  AST  32  /  ALT  272<H>  /  AlkPhos  247<H>  12-08            RADIOLOGY & ADDITIONAL STUDIES REVIEWED:  ***    [ ]GOALS OF CARE DISCUSSION WITH PATIENT/FAMILY/PROXY:    CRITICAL CARE TIME SPENT: 35 minutes INTERVAL HPI/OVERNIGHT EVENTS: Patient desaturates on high flow and is anxious, she is refusing to prone despite multiple discussions with her     PRESSORS: [ ] YES [ x] NO  WHICH:    ANTIBIOTICS:                  DATE STARTED:  ANTIBIOTICS:                  DATE STARTED:  ANTIBIOTICS:                  DATE STARTED:    Antimicrobial:    Cardiovascular:    Pulmonary:  ALBUTerol    90 MICROgram(s) HFA Inhaler 2 Puff(s) Inhalation every 6 hours    Hematalogic:  enoxaparin Injectable 40 milliGRAM(s) SubCutaneous daily    Other:  acetaminophen   Tablet .. 650 milliGRAM(s) Oral every 6 hours PRN  ALPRAZolam 1 milliGRAM(s) Oral every 12 hours  chlorhexidine 2% Cloths 1 Application(s) Topical <User Schedule>  dexAMETHasone     Tablet 6 milliGRAM(s) Oral daily  levothyroxine 100 MICROGram(s) Oral daily  morphine  - Injectable 2 milliGRAM(s) IV Push every 4 hours PRN  pantoprazole    Tablet 40 milliGRAM(s) Oral before breakfast  polyethylene glycol 3350 17 Gram(s) Oral daily  senna 2 Tablet(s) Oral at bedtime    acetaminophen   Tablet .. 650 milliGRAM(s) Oral every 6 hours PRN  ALBUTerol    90 MICROgram(s) HFA Inhaler 2 Puff(s) Inhalation every 6 hours  ALPRAZolam 1 milliGRAM(s) Oral every 12 hours  chlorhexidine 2% Cloths 1 Application(s) Topical <User Schedule>  dexAMETHasone     Tablet 6 milliGRAM(s) Oral daily  enoxaparin Injectable 40 milliGRAM(s) SubCutaneous daily  levothyroxine 100 MICROGram(s) Oral daily  morphine  - Injectable 2 milliGRAM(s) IV Push every 4 hours PRN  pantoprazole    Tablet 40 milliGRAM(s) Oral before breakfast  polyethylene glycol 3350 17 Gram(s) Oral daily  senna 2 Tablet(s) Oral at bedtime    Drug Dosing Weight  Height (cm): 162.6 (01 Dec 2020 09:53)  Weight (kg): 85.7 (01 Dec 2020 09:53)  BMI (kg/m2): 32.4 (01 Dec 2020 09:53)  BSA (m2): 1.91 (01 Dec 2020 09:53)    CENTRAL LINE: [ ] YES [ x] NO  LOCATION:   DATE INSERTED:  REMOVE: [ ] YES [ ] NO  EXPLAIN:    DONALD: [ ] YES [x ] NO    DATE INSERTED:  REMOVE:  [ ] YES [ ] NO  EXPLAIN:    A-LINE:  [ ] YES [ x] NO  LOCATION:   DATE INSERTED:  REMOVE:  [ ] YES [ ] NO  EXPLAIN:    PMH -reviewed admission note, no change since admission  PAST MEDICAL & SURGICAL HISTORY:  Lumbar back pain    Osteoporosis    History of tracheal stenosis    Other specified diseases of upper respiratory tract    Hypothyroidism    Kidney stones    Gallstones    Fatty liver    Thyroid disease    Obesity    Constipation    Gastric ulcer  February 2016- h/o GI bleed which patient had to be intubated Jan 2016    Arthritis of shoulder region, right    Arthritis  right shoulder    S/P bronchoscopy  2017    S/P bronchoscopy  5/16    Hyperparathyroidism  2012 parathyroidectomy    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    Gastric ulcer  &quot;four endoscopies&quot; for diagnosis of gastric ulcer in February 2016        ICU Vital Signs Last 24 Hrs  T(C): 36.2 (08 Dec 2020 04:43), Max: 36.4 (07 Dec 2020 09:00)  T(F): 97.2 (08 Dec 2020 04:43), Max: 97.6 (07 Dec 2020 09:00)  HR: 69 (08 Dec 2020 07:00) (64 - 84)  BP: 103/87 (08 Dec 2020 07:00) (99/63 - 134/76)  BP(mean): 91 (08 Dec 2020 07:00) (59 - 107)  RR: 32 (08 Dec 2020 07:00) (17 - 46)  SpO2: 81% (08 Dec 2020 07:00) (79% - 96%)      ABG - ( 06 Dec 2020 14:12 )  pH, Arterial: 7.45  pH, Blood: x     /  pCO2: 38    /  pO2: 55    / HCO3: 27    / Base Excess: 3.1   /  SaO2: 88                    12-07 @ 07:01  -  12-08 @ 07:00  --------------------------------------------------------  IN: 410 mL / OUT: 1800 mL / NET: -1390 mL            PHYSICAL EXAM:    GENERAL: Anxious , [ x]well-groomed, [x ]well-developed  HEAD:  [x ]Atraumatic, [ x]Normocephalic  EYES: [x ]EOMI, [x ]PERRLA, [x ]conjunctiva and sclera clear  ENMT: [ x]No tonsillar erythema, exudates, or enlargement; [ x]Moist mucous membranes,  NECK: [ ]Supple, normal appearance, [ ]No JVD; [x ]Normal thyroid; [ ]Trachea midline  NERVOUS SYSTEM:  [ x]Alert & Oriented X3, [ x]Good concentration; [x ]Motor Strength 5/5 B/L upper and lower extremities;  CHEST/LUNG: [x ]No chest deformity; [x ]Normal percussion bilaterally;  HEART: [x ]Regular rate and rhythm; [x ]No murmurs, rubs, or gallops  ABDOMEN: [x ]Soft, Nontender, Nondistended; [ x]Bowel sounds present  EXTREMITIES:  [x ]2+ Peripheral Pulses, [ x]No clubbing, cyanosis, or edema  LYMPH: [x ]No lymphadenopathy noted  SKIN: [ x]No rashes or lesions;       LABS:  CBC Full  -  ( 08 Dec 2020 06:11 )  WBC Count : 14.61 K/uL  RBC Count : 4.86 M/uL  Hemoglobin : 14.8 g/dL  Hematocrit : 46.4 %  Platelet Count - Automated : 493 K/uL  Mean Cell Volume : 95.5 fl  Mean Cell Hemoglobin : 30.5 pg  Mean Cell Hemoglobin Concentration : 31.9 gm/dL  Auto Neutrophil # : 13.23 K/uL  Auto Lymphocyte # : 0.74 K/uL  Auto Monocyte # : 0.47 K/uL  Auto Eosinophil # : 0.01 K/uL  Auto Basophil # : 0.02 K/uL  Auto Neutrophil % : 90.5 %  Auto Lymphocyte % : 5.1 %  Auto Monocyte % : 3.2 %  Auto Eosinophil % : 0.1 %  Auto Basophil % : 0.1 %    12-08    133<L>  |  97  |  20<H>  ----------------------------<  231<H>  4.5   |  28  |  0.61    Ca    11.4<H>      08 Dec 2020 06:11  Phos  2.4     12-08  Mg     2.5     12-08    TPro  7.7  /  Alb  2.3<L>  /  TBili  0.3  /  DBili  x   /  AST  32  /  ALT  272<H>  /  AlkPhos  247<H>  12-08            RADIOLOGY & ADDITIONAL STUDIES REVIEWED:  Yes   [ ]GOALS OF CARE DISCUSSION WITH PATIENT/FAMILY/PROXY:    CRITICAL CARE TIME SPENT: 35 minutes

## 2020-12-09 NOTE — PROGRESS NOTE ADULT - ASSESSMENT
ASSESSMENT AND PLAN:  61 yo F from home lives with son (who is autistic, covid +) PMHx of hypothyroid, sleep apnea, HTN PUD, cervical OA PSH tracheal resection and reconstruction, uterous myoma, presents to the ED c/o shortness of breath x 2 days. Patient underwent sleep studyfrom 11/20 to 11/21 ,diagnosed with DARRELL, spiked fever the day after 101.6 associated with headache, and later on cough and diarrhea and tested positive for COVID on 111/23rd. pt has been having fever and diarrhea since Dx. Pt notes onset of  worsening shortness of breath  for past 2 days.  Patient denies vomiting, rash , joint pain or any other acute complaints. Pt started ABx likley amoxicillin (does not remember te name ) and Decadron 6mg X5 days.    Of note : pt reports h/o massive GIB resulted in emergency intubation for 5 days. Pt reports her trachea was damaged as a result of intubation. s/p tracheal resection and reconstruction April 2016. She has undergone multiple surveillance bronchoscopies,    ED course : patient saturated on high 80s on RA on arrival , was placed on NRB and descalated to NC 4 liters, pt saturated 96% on 4L NC,  on my evaluation patient was saturating 97% on 2 L NC however tachypneic while talking or getting exited.  CXR : patchy basilar infiltrates   (01 Dec 2020 13:01)  Pt was saturating 93% on 100% NRB, transferred to ICU for high flow oxygen therapy.     Assessment:   Acute hypoxic respiratory failure due to COVID PNA   Sleep Apnea,   PUD, hypothyroidism     === Neuro===  Alert and oriented x 3    #Anxiety  - added Xanax 0.5mg BID as spO2 drops when pt becomes anxious     #Central sleep apnea?  Called her neurologist Dr. Greg Moreno [phone] for details, but no answer , Will defer it for outpatient     === Cardiovascular===  HTN: not on any medication at home   - continue to monitor for now.     ===- Pulm===  Acute hypoxic respiratory failure due to COVID PNA   c/w Decadron, Remedesivir,   - s/p Rocephin and Azith 12/1-3, Dcd as CXR consistent with Covid PNA  - On high flow 97% 55L, Patient's work of breathing is istable- s/p plasma 12/4 as per pt's request. Explained the study doesn't show benefit  - Patient is on BIPAP but she takes it off because of anxiety. Will add NRB with high flow,   - ID Dr Nelson  - Lovenox DVT proph 40 mg daily as D-Dimer is <150   - Follow inflammatory markers on alternate days     #sleep Apnea : pt recently had Sleep study done and diagnosed with Sleep apnea on Nov 20 but not started on CPAP yet   OP follow up     ====ID===  PNA: management as above     === Nephro===  no active issues     ===GI===  #Elevated LFTs  - LFTs are trending up  - likely from covid  - Hepatitis C panel negative, Hepatitis B pending   - monitor LFTs    #Hx of PUD:   - c/w Protonix     === Heme===  #Leukocytosis   - Likely due to covid infection  - monitor CBC    ===Endocrine===  #hypothyroidism   - TSH low 0.14  - T3  48, Free Thyroxine 1.3  - c/w Levothyroxine     #Hypercalcemia  - Elevated calcium 11, vit D-25 WNL,  vitamin D, 1-25 high, PTH: high   - Will repeat 1-25 OH as per Endo recs   - f/u PTHrp    === Skin/Catheters===  No rashes. Peripheral IV lines.     ===Prophylaxis ===  LOVenox 40mg daily for DVT proph   Protonix for  GI prophylaxis    ===GOC===  DNR/DNI, discussed with pt and daughter over phone and MOLST form signed and placed in the chart.     ===Disposition===  Monitor in ICU   ASSESSMENT AND PLAN:  59 yo F from home lives with son (who is autistic, covid +) PMHx of hypothyroid, sleep apnea, HTN PUD, cervical OA PSH tracheal resection and reconstruction, uterous myoma, presents to the ED c/o shortness of breath x 2 days. Patient underwent sleep studyfrom 11/20 to 11/21 ,diagnosed with DARRELL, spiked fever the day after 101.6 associated with headache, and later on cough and diarrhea and tested positive for COVID on 111/23rd. pt has been having fever and diarrhea since Dx. Pt notes onset of  worsening shortness of breath  for past 2 days.  Patient denies vomiting, rash , joint pain or any other acute complaints. Pt started ABx likley amoxicillin (does not remember te name ) and Decadron 6mg X5 days.    Of note : pt reports h/o massive GIB resulted in emergency intubation for 5 days. Pt reports her trachea was damaged as a result of intubation. s/p tracheal resection and reconstruction April 2016. She has undergone multiple surveillance bronchoscopies,    ED course : patient saturated on high 80s on RA on arrival , was placed on NRB and descalated to NC 4 liters, pt saturated 96% on 4L NC,  on my evaluation patient was saturating 97% on 2 L NC however tachypneic while talking or getting exited.  CXR : patchy basilar infiltrates   (01 Dec 2020 13:01)  Pt was saturating 93% on 100% NRB, transferred to ICU for high flow oxygen therapy.     Assessment:   Acute hypoxic respiratory failure due to COVID PNA   Sleep Apnea,   PUD, hypothyroidism     === Neuro===  Alert and oriented x 3    #Anxiety  - added Xanax 0.5mg BID as spO2 drops when pt becomes anxious , Patient is extremely anxious, family wants us to limit information delivery to her    #Central sleep apnea?  Called her neurologist Dr. Greg Moreno [phone] for details, but no answer , Will defer it for outpatient     === Cardiovascular===  HTN: not on any medication at home   - continue to monitor for now.     ===- Pulm===  Acute hypoxic respiratory failure due to COVID PNA   c/w Decadron, Remedesivir,   - s/p Rocephin and Azith 12/1-3, Dcd as CXR consistent with Covid PNA  - On high flow 97% 55L, Patient's work of breathing is istable- s/p plasma 12/4 as per pt's request. Explained the study doesn't show benefit  - Patient is on BIPAP but she takes it off because of anxiety. Currently on HFNC,   - ID Dr Nelson  - Lovenox DVT proph 40 mg daily as D-Dimer is <150   - Follow inflammatory markers on alternate days     #sleep Apnea : pt recently had Sleep study done and diagnosed with Sleep apnea on Nov 20 but not started on CPAP yet   OP follow up     ====ID===  PNA: management as above     === Nephro===  no active issues     ===GI===  #Elevated LFTs  - LFTs are trending up  - likely from covid  - Hepatitis C panel negative, Hepatitis B Ab positive  - monitor LFTs    #Hx of PUD:   - c/w Protonix     === Heme===  #Leukocytosis   - Likely due to covid infection  - monitor CBC    ===Endocrine===  #hypothyroidism   - TSH low 0.14  - T3  48, Free Thyroxine 1.3  - c/w Levothyroxine     #Hypercalcemia  - Elevated calcium 11, vit D-25 WNL,  vitamin D, 1-25 high, PTH: high   -Repeat 1-25 OH elevated  - f/u PTHrp    === Skin/Catheters===  No rashes. Peripheral IV lines.     ===Prophylaxis ===  LOVenox 40mg daily for DVT proph   Protonix for  GI prophylaxis    ===GOC===  DNR/DNI, discussed with pt and daughter over phone and MOLST form signed and placed in the chart.   Daughter is being updated that patient might end up getting intubated as she is tachypneac, Saturating to 90's and is getting tired     ===Disposition===  Monitor in ICU

## 2020-12-09 NOTE — PROGRESS NOTE ADULT - SUBJECTIVE AND OBJECTIVE BOX
Patient is a 60y old  Female who presents with a chief complaint of COVID PNA requiring oxygen supplementation (08 Dec 2020 11:37)    pt seen in icu [ x ], reg med floor [   ], bed [ x ], chair at bedside [   ], a+o x3 [ x ], lethargic [  ],    nad [x  ]      Allergies    No Known Allergies        Vitals    T(F): 98 (12-09-20 @ 04:51), Max: 98.5 (12-09-20 @ 00:00)  HR: 72 (12-09-20 @ 05:00) (63 - 94)  BP: 123/75 (12-09-20 @ 05:00) (95/53 - 149/90)  RR: 29 (12-09-20 @ 05:00) (20 - 42)  SpO2: 92% (12-09-20 @ 05:00) (80% - 95%)  Wt(kg): --  CAPILLARY BLOOD GLUCOSE      POCT Blood Glucose.: 177 mg/dL (09 Dec 2020 05:19)      Labs                          13.0   18.48 )-----------( 459      ( 09 Dec 2020 06:15 )             40.2       12-08    133<L>  |  97  |  20<H>  ----------------------------<  231<H>  4.5   |  28  |  0.61    Ca    11.4<H>      08 Dec 2020 06:11  Phos  2.4     12-08  Mg     2.5     12-08    TPro  7.7  /  Alb  2.3<L>  /  TBili  0.3  /  DBili  x   /  AST  32  /  ALT  272<H>  /  AlkPhos  247<H>  12-08                Radiology Results      Meds    MEDICATIONS  (STANDING):  ALBUTerol    90 MICROgram(s) HFA Inhaler 2 Puff(s) Inhalation every 6 hours  ALPRAZolam 1 milliGRAM(s) Oral every 12 hours  chlorhexidine 2% Cloths 1 Application(s) Topical <User Schedule>  dexAMETHasone     Tablet 6 milliGRAM(s) Oral daily  dextrose 5%. 1000 milliLiter(s) (100 mL/Hr) IV Continuous <Continuous>  dextrose 50% Injectable 25 Gram(s) IV Push once  dextrose 50% Injectable 12.5 Gram(s) IV Push once  enoxaparin Injectable 40 milliGRAM(s) SubCutaneous daily  glucagon  Injectable 1 milliGRAM(s) IntraMuscular once  insulin lispro (ADMELOG) corrective regimen sliding scale   SubCutaneous three times a day before meals  levothyroxine 100 MICROGram(s) Oral daily  pantoprazole    Tablet 40 milliGRAM(s) Oral before breakfast  polyethylene glycol 3350 17 Gram(s) Oral daily  senna 2 Tablet(s) Oral at bedtime      MEDICATIONS  (PRN):  acetaminophen   Tablet .. 650 milliGRAM(s) Oral every 6 hours PRN Temp greater or equal to 38C (100.4F), Mild Pain (1 - 3)      Physical Exam    Neuro :  no focal deficits  Respiratory: CTA B/L  CV: RRR, S1S2, no murmurs,   Abdominal: Soft, NT, ND +BS,  Extremities: No edema, + peripheral pulses          ASSESSMENT    Infection due to severe acute respiratory syndrome coronavirus 2 (SARS-CoV-2)  pneumonia,   hypoxia,   h/o hypothyroid,   sleep apnea,   PUD,   cervical OA   tracheal resection and reconstruction,   uterus myoma,   HTN,   gi bleed  Osteoporosis  Hyperparathyroidism      PLAN    id f/u   Cont Decadron 6mgs iv q24hrs  completed Remdesivir 12/06/20  s/p convalescent plasma  cont decadron,   cont contact and airborne isolation,   cont albuterol inhaler,   cont tylenol prn, robitussin prn   pulm f/u   O2 sat (79% - 96%) on bipap   cont bipap as needed,   pt afebrile  dvt prophylaxis  covid-19 antibody test neg,   resp viral panel positive for covid 19,   procalcitonin, D-dimer wnl noted     esr, crp, ldh, ferritin elevated noted    endo cons noted   hypercalcemia due to hyperparathyroidism  high vitamin d 1-25 ? etiology-   hx of parathyroid surgery r/o pth hyperplasia  cont to monitor calcium  keep pt hydrated   consider sensipar vs neck reexploration when stable as out pt  cont current meds  mgmt as per icu       Patient is a 60y old  Female who presents with a chief complaint of COVID PNA requiring oxygen supplementation (08 Dec 2020 11:37)    pt seen in icu [ x ], reg med floor [   ], bed [ x ], chair at bedside [   ], a+o x3 [ x ], lethargic [  ],    nad [x  ]      Allergies    No Known Allergies        Vitals    T(F): 98 (12-09-20 @ 04:51), Max: 98.5 (12-09-20 @ 00:00)  HR: 72 (12-09-20 @ 05:00) (63 - 94)  BP: 123/75 (12-09-20 @ 05:00) (95/53 - 149/90)  RR: 29 (12-09-20 @ 05:00) (20 - 42)  SpO2: 92% (12-09-20 @ 05:00) (80% - 95%)  Wt(kg): --  CAPILLARY BLOOD GLUCOSE      POCT Blood Glucose.: 177 mg/dL (09 Dec 2020 05:19)      Labs                          13.0   18.48 )-----------( 459      ( 09 Dec 2020 06:15 )             40.2       12-08    133<L>  |  97  |  20<H>  ----------------------------<  231<H>  4.5   |  28  |  0.61    Ca    11.4<H>      08 Dec 2020 06:11  Phos  2.4     12-08  Mg     2.5     12-08    TPro  7.7  /  Alb  2.3<L>  /  TBili  0.3  /  DBili  x   /  AST  32  /  ALT  272<H>  /  AlkPhos  247<H>  12-08                Radiology Results      Meds    MEDICATIONS  (STANDING):  ALBUTerol    90 MICROgram(s) HFA Inhaler 2 Puff(s) Inhalation every 6 hours  ALPRAZolam 1 milliGRAM(s) Oral every 12 hours  chlorhexidine 2% Cloths 1 Application(s) Topical <User Schedule>  dexAMETHasone     Tablet 6 milliGRAM(s) Oral daily  dextrose 5%. 1000 milliLiter(s) (100 mL/Hr) IV Continuous <Continuous>  dextrose 50% Injectable 25 Gram(s) IV Push once  dextrose 50% Injectable 12.5 Gram(s) IV Push once  enoxaparin Injectable 40 milliGRAM(s) SubCutaneous daily  glucagon  Injectable 1 milliGRAM(s) IntraMuscular once  insulin lispro (ADMELOG) corrective regimen sliding scale   SubCutaneous three times a day before meals  levothyroxine 100 MICROGram(s) Oral daily  pantoprazole    Tablet 40 milliGRAM(s) Oral before breakfast  polyethylene glycol 3350 17 Gram(s) Oral daily  senna 2 Tablet(s) Oral at bedtime      MEDICATIONS  (PRN):  acetaminophen   Tablet .. 650 milliGRAM(s) Oral every 6 hours PRN Temp greater or equal to 38C (100.4F), Mild Pain (1 - 3)      Physical Exam    Neuro :  no focal deficits  Respiratory: CTA B/L  CV: RRR, S1S2, no murmurs,   Abdominal: Soft, NT, ND +BS,  Extremities: No edema, + peripheral pulses          ASSESSMENT    Infection due to severe acute respiratory syndrome coronavirus 2 (SARS-CoV-2)  pneumonia,   hypoxia,   h/o hypothyroid,   sleep apnea,   PUD,   cervical OA   tracheal resection and reconstruction,   uterus myoma,   HTN,   gi bleed  Osteoporosis  Hyperparathyroidism      PLAN    id f/u   Cont Decadron 6mgs iv q24hrs  completed Remdesivir 12/06/20  s/p convalescent plasma  cont decadron,   cont contact and airborne isolation,   cont albuterol inhaler,   cont tylenol prn, robitussin prn   pulm f/u   O2 sat (80% - 95%) on hiflo   bipap as needed,   pt afebrile  dvt prophylaxis  covid-19 antibody test neg,   resp viral panel positive for covid 19,   procalcitonin, D-dimer wnl noted     esr, crp, ldh, ferritin elevated noted    endo f/u  hypercalcemia due to hyperparathyroidism  high vitamin d 1-25 ? etiology-   hx of parathyroid surgery r/o pth hyperplasia  s/p one dose of iv aredia 90mg   cont to monitor calcium  keep pt hydrated   cont current meds  mgmt as per icu

## 2020-12-09 NOTE — PROGRESS NOTE ADULT - SUBJECTIVE AND OBJECTIVE BOX
Naresh Horton   MRN: [id number]    Department:  [facility]   Date of Visit:  6/12/2018           Disclosure     Insurance plans vary and the physician(s) referred by the ER may not be covered by your plan.  Please conta Patient is a 60y old  Female who presents with a chief complaint of COVID PNA requiring oxygen supplementation (09 Dec 2020 10:06)  Patient is awake, alert, laying in bed in NAD. Sat 86% on high flow oxygen supp. Occasional cough    INTERVAL HPI/OVERNIGHT EVENTS:      VITAL SIGNS:  T(F): 98.6 (12-09-20 @ 07:00)  HR: 70 (12-09-20 @ 12:00)  BP: 125/62 (12-09-20 @ 12:00)  RR: 40 (12-09-20 @ 12:00)  SpO2: 92% (12-09-20 @ 12:00)  Wt(kg): --  I&O's Detail    08 Dec 2020 07:01  -  09 Dec 2020 07:00  --------------------------------------------------------  IN:    IV PiggyBack: 250 mL    Oral Fluid: 600 mL  Total IN: 850 mL    OUT:    Voided (mL): 1950 mL  Total OUT: 1950 mL    Total NET: -1100 mL              REVIEW OF SYSTEMS:    CONSTITUTIONAL:  No fevers, chills, sweats    HEENT:  Eyes:  No diplopia or blurred vision. ENT:  No earache, sore throat or runny nose.    CARDIOVASCULAR:  No pressure, squeezing, tightness, or heaviness about the chest; no palpitations.    RESPIRATORY:  Per HPI    GASTROINTESTINAL:  No abdominal pain, nausea, vomiting or diarrhea.    GENITOURINARY:  No dysuria, frequency or urgency.    NEUROLOGIC:  No paresthesias, fasciculations, seizures or weakness.    PSYCHIATRIC:  No disorder of thought or mood.      PHYSICAL EXAM:    Constitutional: Well developed and nourished  Eyes:Perrla  ENMT: normal  Neck:supple  Respiratory: Rales post  Cardiovascular: S1 S2 regular  Gastrointestinal: Soft, Non tender  Extremities: No edema  Vascular:normal  Neurological:Awake, alert,Ox3  Musculoskeletal:Normal      MEDICATIONS  (STANDING):  ALBUTerol    90 MICROgram(s) HFA Inhaler 2 Puff(s) Inhalation every 6 hours  ALPRAZolam 1 milliGRAM(s) Oral every 12 hours  chlorhexidine 2% Cloths 1 Application(s) Topical <User Schedule>  dexAMETHasone     Tablet 6 milliGRAM(s) Oral daily  dextrose 5%. 1000 milliLiter(s) (100 mL/Hr) IV Continuous <Continuous>  dextrose 50% Injectable 25 Gram(s) IV Push once  dextrose 50% Injectable 12.5 Gram(s) IV Push once  enoxaparin Injectable 40 milliGRAM(s) SubCutaneous daily  glucagon  Injectable 1 milliGRAM(s) IntraMuscular once  insulin lispro (ADMELOG) corrective regimen sliding scale   SubCutaneous three times a day before meals  levothyroxine 100 MICROGram(s) Oral daily  pantoprazole    Tablet 40 milliGRAM(s) Oral before breakfast  polyethylene glycol 3350 17 Gram(s) Oral daily  senna 2 Tablet(s) Oral at bedtime  sodium phosphate IVPB 15 milliMole(s) IV Intermittent once    MEDICATIONS  (PRN):  acetaminophen   Tablet .. 650 milliGRAM(s) Oral every 6 hours PRN Temp greater or equal to 38C (100.4F), Mild Pain (1 - 3)      Allergies    No Known Allergies    Intolerances        LABS:                        13.0   18.48 )-----------( 459      ( 09 Dec 2020 06:15 )             40.2     12-09    135  |  98  |  18  ----------------------------<  158<H>  4.3   |  26  |  0.50    Ca    10.5      09 Dec 2020 06:15  Phos  2.3     12-09  Mg     2.4     12-09    TPro  6.8  /  Alb  2.1<L>  /  TBili  0.3  /  DBili  x   /  AST  24  /  ALT  165<H>  /  AlkPhos  178<H>  12-09              CAPILLARY BLOOD GLUCOSE      POCT Blood Glucose.: 198 mg/dL (09 Dec 2020 11:05)  POCT Blood Glucose.: 177 mg/dL (09 Dec 2020 05:19)  POCT Blood Glucose.: 324 mg/dL (08 Dec 2020 16:55)    pro-bnp -- 12-05 @ 15:42     d-dimer <150  12-05 @ 15:42      RADIOLOGY & ADDITIONAL TESTS:    CXR:  x< from: Xray Chest 1 View- PORTABLE-Routine (Xray Chest 1 View- PORTABLE-Routine in AM.) (12.09.20 @ 08:14) >  IMPRESSION:  Progression of infiltrates.    < end of copied text >    Ct scan chest:    ekg;    echo: tell this physician (or your personal doctor if your instructions are to return to your personal doctor) about any new or lasting problems. The primary care or specialist physician will see patients referred from the [facility] Emergency Department.  Juan Manuel Dillard

## 2020-12-09 NOTE — PROGRESS NOTE ADULT - SUBJECTIVE AND OBJECTIVE BOX
Interval Events:  pt in nad    Allergies    No Known Allergies    Intolerances      Endocrine/Metabolic Medications:  dexAMETHasone     Tablet 6 milliGRAM(s) Oral daily  dextrose 50% Injectable 25 Gram(s) IV Push once  dextrose 50% Injectable 12.5 Gram(s) IV Push once  glucagon  Injectable 1 milliGRAM(s) IntraMuscular once  insulin lispro (ADMELOG) corrective regimen sliding scale   SubCutaneous three times a day before meals  levothyroxine 100 MICROGram(s) Oral daily      Vital Signs Last 24 Hrs  T(C): 36.7 (09 Dec 2020 04:51), Max: 36.9 (09 Dec 2020 00:00)  T(F): 98 (09 Dec 2020 04:51), Max: 98.5 (09 Dec 2020 00:00)  HR: 70 (09 Dec 2020 08:30) (63 - 94)  BP: 117/68 (09 Dec 2020 07:00) (95/53 - 149/90)  BP(mean): 78 (09 Dec 2020 07:00) (70 - 113)  RR: 27 (09 Dec 2020 08:30) (25 - 42)  SpO2: 91% (09 Dec 2020 08:30) (80% - 95%)      PHYSICAL EXAM  All physical exam findings normal, except those marked:  General:	Alert, active, cooperative, NAD, well hydrated  .		[] Abnormal:  Neck		Normal: supple, no cervical adenopathy, no palpable thyroid  .		[] Abnormal:  Cardiovascular	Normal: regular rate, normal S1, S2, no murmurs  .		[] Abnormal:  Respiratory	Normal: no chest wall deformity, normal respiratory pattern, CTA B/L  .		[] Abnormal:  Abdominal	Normal: soft, ND, NT, bowel sounds present, no masses, no organomegaly  .		[] Abnormal:  		Normal normal genitalia, testes descended, circumcised/uncircumcised  .		Lenore stage:			Breast lenore:  .		Menstrual history:  .		[] Abnormal:  Extremities	Normal: FROM x4  .		[] Abnormal:  Skin		Normal: intact and not indurated, no rash, no acanthosis nigricans  .		[] Abnormal:  Neurologic	Normal: grossly intact  .		[] Abnormal:    LABS                        13.0   18.48 )-----------( 459      ( 09 Dec 2020 06:15 )             40.2                               135    |  98     |  18                  Calcium: 10.5  / iCa: x      (12-09 @ 06:15)    ----------------------------<  158       Magnesium: 2.4                              4.3     |  26     |  0.50             Phosphorous: 2.3      TPro  6.8    /  Alb  2.1    /  TBili  0.3    /  DBili  x      /  AST  24     /  ALT  165    /  AlkPhos  178    09 Dec 2020 06:15    CAPILLARY BLOOD GLUCOSE      POCT Blood Glucose.: 177 mg/dL (09 Dec 2020 05:19)  POCT Blood Glucose.: 324 mg/dL (08 Dec 2020 16:55)  POCT Blood Glucose.: 275 mg/dL (08 Dec 2020 11:10)        Assesment/plan    61 yo F from home lives with son (who is autistic, covid +) PMHx of hypothyroid, sleep apnea, PUD, cervical OA PSH tracheal resection and reconstruction, uterous myoma, HTN,  presents to the ED c/o shortness of breath x 2 days. Admitted with COVID PNA. Found to have hypercalcemia. Pt has hx of hyperparathyroidism s/p ? parathyroidectomy.       Problem/Recommendation - 1:  Problem: Hypercalcemia. due to hyperparathyroidism  high vitamin d 1-25 ? etiology- await ACE levels  hx of parathyroid surgery r/o pth hyperplasia  worsening hypercalcemia - s/pone dose of iv aredia 90mg   calcium improving  cont to monitor calcium  d/w hs       Problem/Recommendation - 2:  ·  Problem: Hypothyroidism.  Recommendation: cont lt4 100 mcg  low tsh likely due to steroids.      Problem/Recommendation - 3:  ·  Problem: COVID-19 virus infection.  Recommendation: cont tx per icu team.

## 2020-12-10 NOTE — PROGRESS NOTE ADULT - SUBJECTIVE AND OBJECTIVE BOX
Patient is a 60y old  Female who presents with a chief complaint of COVID PNA requiring oxygen supplementation (10 Dec 2020 08:43)  Patient is awake, alert, laying in bed in NAD. Still with some Espinoza but cough has improved. Remains on high flow oxygen supp    INTERVAL HPI/OVERNIGHT EVENTS:      VITAL SIGNS:  T(F): 98.6 (12-10-20 @ 07:00)  HR: 80 (12-10-20 @ 10:00)  BP: 116/62 (12-10-20 @ 10:00)  RR: 36 (12-10-20 @ 10:00)  SpO2: 90% (12-10-20 @ 10:00)  Wt(kg): --  I&O's Detail    09 Dec 2020 07:01  -  10 Dec 2020 07:00  --------------------------------------------------------  IN:    IV PiggyBack: 375 mL  Total IN: 375 mL    OUT:    Voided (mL): 1000 mL  Total OUT: 1000 mL    Total NET: -625 mL              REVIEW OF SYSTEMS:    CONSTITUTIONAL:  No fevers, chills, sweats    HEENT:  Eyes:  No diplopia or blurred vision. ENT:  No earache, sore throat or runny nose.    CARDIOVASCULAR:  No pressure, squeezing, tightness, or heaviness about the chest; no palpitations.    RESPIRATORY:  Per HPI    GASTROINTESTINAL:  No abdominal pain, nausea, vomiting or diarrhea.    GENITOURINARY:  No dysuria, frequency or urgency.    NEUROLOGIC:  No paresthesias, fasciculations, seizures or weakness.    PSYCHIATRIC:  No disorder of thought or mood.      PHYSICAL EXAM:    Constitutional: Well developed and nourished  Eyes:Perrla  ENMT: normal  Neck:supple  Respiratory: good air entry  Cardiovascular: S1 S2 regular  Gastrointestinal: Soft, Non tender  Extremities: No edema  Vascular:normal  Neurological:Awake, alert,Ox3  Musculoskeletal:Normal      MEDICATIONS  (STANDING):  ALBUTerol    90 MICROgram(s) HFA Inhaler 2 Puff(s) Inhalation every 6 hours  ALPRAZolam 1 milliGRAM(s) Oral every 12 hours  chlorhexidine 2% Cloths 1 Application(s) Topical <User Schedule>  dextrose 5%. 1000 milliLiter(s) (100 mL/Hr) IV Continuous <Continuous>  dextrose 50% Injectable 25 Gram(s) IV Push once  dextrose 50% Injectable 12.5 Gram(s) IV Push once  enoxaparin Injectable 40 milliGRAM(s) SubCutaneous daily  glucagon  Injectable 1 milliGRAM(s) IntraMuscular once  insulin lispro (ADMELOG) corrective regimen sliding scale   SubCutaneous three times a day before meals  levothyroxine 100 MICROGram(s) Oral daily  pantoprazole    Tablet 40 milliGRAM(s) Oral before breakfast  polyethylene glycol 3350 17 Gram(s) Oral daily  senna 2 Tablet(s) Oral at bedtime    MEDICATIONS  (PRN):  acetaminophen   Tablet .. 650 milliGRAM(s) Oral every 6 hours PRN Temp greater or equal to 38C (100.4F), Mild Pain (1 - 3)      Allergies    No Known Allergies    Intolerances        LABS:                        13.0   13.29 )-----------( 385      ( 10 Dec 2020 06:47 )             40.1     12-10    135  |  99  |  16  ----------------------------<  106<H>  4.0   |  29  |  0.45<L>    Ca    10.0      10 Dec 2020 06:47  Phos  2.3     12-10  Mg     2.2     12-10    TPro  6.6  /  Alb  2.1<L>  /  TBili  0.4  /  DBili  x   /  AST  19  /  ALT  105<H>  /  AlkPhos  158<H>  12-10        ABG - ( 10 Dec 2020 04:18 )  pH, Arterial: 7.43  pH, Blood: x     /  pCO2: 44    /  pO2: 55    / HCO3: 29    / Base Excess: 4.8   /  SaO2: 87                    CAPILLARY BLOOD GLUCOSE      POCT Blood Glucose.: 117 mg/dL (10 Dec 2020 06:00)  POCT Blood Glucose.: 149 mg/dL (09 Dec 2020 17:03)  POCT Blood Glucose.: 198 mg/dL (09 Dec 2020 11:05)    pro-bnp -- 12-05 @ 15:42     d-dimer <150  12-05 @ 15:42      RADIOLOGY & ADDITIONAL TESTS:    CXR:    Ct scan chest:    ekg;    echo:

## 2020-12-10 NOTE — PROGRESS NOTE ADULT - ASSESSMENT
ASSESSMENT AND PLAN:  59 yo F from home lives with son (who is autistic, covid +) PMHx of hypothyroid, sleep apnea, HTN PUD, cervical OA PSH tracheal resection and reconstruction, uterous myoma, presents to the ED c/o shortness of breath x 2 days. Patient underwent sleep studyfrom 11/20 to 11/21 ,diagnosed with DARRELL, spiked fever the day after 101.6 associated with headache, and later on cough and diarrhea and tested positive for COVID on 111/23rd. pt has been having fever and diarrhea since Dx. Pt notes onset of  worsening shortness of breath  for past 2 days.  Patient denies vomiting, rash , joint pain or any other acute complaints. Pt started ABx likley amoxicillin (does not remember te name ) and Decadron 6mg X5 days.    Of note : pt reports h/o massive GIB resulted in emergency intubation for 5 days. Pt reports her trachea was damaged as a result of intubation. s/p tracheal resection and reconstruction April 2016. She has undergone multiple surveillance bronchoscopies,    ED course : patient saturated on high 80s on RA on arrival , was placed on NRB and descalated to NC 4 liters, pt saturated 96% on 4L NC,  on my evaluation patient was saturating 97% on 2 L NC however tachypneic while talking or getting exited.  CXR : patchy basilar infiltrates   (01 Dec 2020 13:01)  Pt was saturating 93% on 100% NRB, transferred to ICU for high flow oxygen therapy.     Assessment:   Acute hypoxic respiratory failure due to COVID PNA   Sleep Apnea,   PUD, hypothyroidism     === Neuro===  Alert and oriented x 3    #Anxiety  - added Xanax 0.5mg BID as spO2 drops when pt becomes anxious , Patient is extremely anxious, family wants us to limit information delivery to her    #Central sleep apnea?  Called her neurologist Dr. Greg Moreno [phone] for details, but no answer , Will defer it for outpatient     === Cardiovascular===  HTN: not on any medication at home   - continue to monitor for now.     ===- Pulm===  Acute hypoxic respiratory failure due to COVID PNA   c/w Decadron, Remedesivir,   - s/p Rocephin and Azith 12/1-3, Dcd as CXR consistent with Covid PNA  - On high flow 97% 55L, Patient's work of breathing is istable- s/p plasma 12/4 as per pt's request. Explained the study doesn't show benefit  - Patient is on BIPAP but she takes it off because of anxiety. Currently on HFNC,   - ID Dr Nelson  - Lovenox DVT proph 40 mg daily as D-Dimer is <150   - Follow inflammatory markers on alternate days     #sleep Apnea : pt recently had Sleep study done and diagnosed with Sleep apnea on Nov 20 but not started on CPAP yet   OP follow up     ====ID===  PNA: management as above     === Nephro===  no active issues     ===GI===  #Elevated LFTs  - LFTs are trending up  - likely from covid  - Hepatitis C panel negative, Hepatitis B Ab positive  - monitor LFTs    #Hx of PUD:   - c/w Protonix     === Heme===  #Leukocytosis   - Likely due to covid infection  - monitor CBC    ===Endocrine===  #hypothyroidism   - TSH low 0.14  - T3  48, Free Thyroxine 1.3  - c/w Levothyroxine     #Hypercalcemia  - Elevated calcium 11, vit D-25 WNL,  vitamin D, 1-25 high, PTH: high   -Repeat 1-25 OH elevated  - f/u PTHrp    === Skin/Catheters===  No rashes. Peripheral IV lines.     ===Prophylaxis ===  LOVenox 40mg daily for DVT proph   Protonix for  GI prophylaxis    ===GOC===  DNR/DNI, discussed with pt and daughter over phone and MOLST form signed and placed in the chart.   Daughter is being updated that patient might end up getting intubated as she is tachypneac, Saturating to 90's and is getting tired     ===Disposition===  Monitor in ICU   ASSESSMENT AND PLAN:  59 yo F from home lives with son (who is autistic, covid +) PMHx of hypothyroid, sleep apnea, HTN PUD, cervical OA PSH tracheal resection and reconstruction, uterous myoma, presents to the ED c/o shortness of breath x 2 days. Patient underwent sleep studyfrom 11/20 to 11/21 ,diagnosed with DARRELL, spiked fever the day after 101.6 associated with headache, and later on cough and diarrhea and tested positive for COVID on 111/23rd. pt has been having fever and diarrhea since Dx. Pt notes onset of  worsening shortness of breath  for past 2 days.  Patient denies vomiting, rash , joint pain or any other acute complaints. Pt started ABx likley amoxicillin (does not remember te name ) and Decadron 6mg X5 days.    Of note : pt reports h/o massive GIB resulted in emergency intubation for 5 days. Pt reports her trachea was damaged as a result of intubation. s/p tracheal resection and reconstruction April 2016. She has undergone multiple surveillance bronchoscopies,    ED course : patient saturated on high 80s on RA on arrival , was placed on NRB and descalated to NC 4 liters, pt saturated 96% on 4L NC,  on my evaluation patient was saturating 97% on 2 L NC however tachypneic while talking or getting exited.  CXR : patchy basilar infiltrates   (01 Dec 2020 13:01)  Pt was saturating 93% on 100% NRB, transferred to ICU for high flow oxygen therapy.     Assessment:   Acute hypoxic respiratory failure due to COVID PNA   Sleep Apnea,   PUD, hypothyroidism     === Neuro===  Alert and oriented x 3    #Anxiety  - added Xanax 0.5mg BID as spO2 drops when pt becomes anxious , Patient is extremely anxious, family wants us to limit information delivery to her  - Non compliant with proning recs    #Central sleep apnea?  Called her neurologist Dr. Greg Moreno [phone] for details, but no answer , Will defer it for outpatient     === Cardiovascular===  HTN: not on any medication at home   - continue to monitor for now.     ===- Pulm===  Acute hypoxic respiratory failure due to COVID PNA   c/w Decadron, Remedesivir,   - s/p Rocephin and Azith 12/1-3, Dcd as CXR consistent with Covid PNA  - On high flow 97% 55L, Patient's work of breathing is stable- s/p plasma 12/4 as per pt's request. Explained the study doesn't show benefit  - Patient is currently on 45L 97%  saturating above 90%, tachyoneac 12/10  - ID Dr Nelson  - Lovenox DVT proph 40 mg daily as D-Dimer is <150   - Follow inflammatory markers on alternate days     #sleep Apnea : pt recently had Sleep study done and diagnosed with Sleep apnea on Nov 20 but not started on CPAP yet   OP follow up     ====ID===  PNA: management as above     === Nephro===  no active issues     ===GI===  #Elevated LFTs  - LFTs are trending up  - likely from covid  - Hepatitis C panel negative, Hepatitis B Ab positive  - monitor LFTs    #Hx of PUD:   - c/w Protonix     === Heme===  #Leukocytosis   - Likely due to covid infection  - monitor CBC    ===Endocrine===  #hypothyroidism   - TSH low 0.14  - T3  48, Free Thyroxine 1.3  - c/w Levothyroxine     #Hypercalcemia  - Elevated calcium 11, vit D-25 WNL,  vitamin D, 1-25 high, PTH: high   -Repeat 1-25 OH elevated  - f/u PTHrp    === Skin/Catheters===  No rashes. Peripheral IV lines.     ===Prophylaxis ===  LOVenox 40mg daily for DVT proph   Protonix for  GI prophylaxis    ===GOC===  DNR/DNI, discussed with pt and daughter over phone and MOLST form signed and placed in the chart.   Daughter is being updated that patient might end up getting intubated as she is tachypneac, Saturating to 90's and is getting tired     ===Disposition===  Monitor in ICU

## 2020-12-10 NOTE — PROGRESS NOTE ADULT - SUBJECTIVE AND OBJECTIVE BOX
INTERVAL HPI/OVERNIGHT EVENTS: No significant event overnight    PRESSORS: [ ] YES [x ] NO  WHICH:    ANTIBIOTICS:                  DATE STARTED:  ANTIBIOTICS:                  DATE STARTED:  ANTIBIOTICS:                  DATE STARTED:    Antimicrobial:    Cardiovascular:    Pulmonary:  ALBUTerol    90 MICROgram(s) HFA Inhaler 2 Puff(s) Inhalation every 6 hours    Hematalogic:  enoxaparin Injectable 40 milliGRAM(s) SubCutaneous daily    Other:  acetaminophen   Tablet .. 650 milliGRAM(s) Oral every 6 hours PRN  ALPRAZolam 1 milliGRAM(s) Oral every 12 hours  chlorhexidine 2% Cloths 1 Application(s) Topical <User Schedule>  dexAMETHasone     Tablet 6 milliGRAM(s) Oral daily  dextrose 5%. 1000 milliLiter(s) IV Continuous <Continuous>  dextrose 50% Injectable 25 Gram(s) IV Push once  dextrose 50% Injectable 12.5 Gram(s) IV Push once  glucagon  Injectable 1 milliGRAM(s) IntraMuscular once  insulin lispro (ADMELOG) corrective regimen sliding scale   SubCutaneous three times a day before meals  levothyroxine 100 MICROGram(s) Oral daily  pantoprazole    Tablet 40 milliGRAM(s) Oral before breakfast  polyethylene glycol 3350 17 Gram(s) Oral daily  senna 2 Tablet(s) Oral at bedtime    acetaminophen   Tablet .. 650 milliGRAM(s) Oral every 6 hours PRN  ALBUTerol    90 MICROgram(s) HFA Inhaler 2 Puff(s) Inhalation every 6 hours  ALPRAZolam 1 milliGRAM(s) Oral every 12 hours  chlorhexidine 2% Cloths 1 Application(s) Topical <User Schedule>  dexAMETHasone     Tablet 6 milliGRAM(s) Oral daily  dextrose 5%. 1000 milliLiter(s) IV Continuous <Continuous>  dextrose 50% Injectable 25 Gram(s) IV Push once  dextrose 50% Injectable 12.5 Gram(s) IV Push once  enoxaparin Injectable 40 milliGRAM(s) SubCutaneous daily  glucagon  Injectable 1 milliGRAM(s) IntraMuscular once  insulin lispro (ADMELOG) corrective regimen sliding scale   SubCutaneous three times a day before meals  levothyroxine 100 MICROGram(s) Oral daily  pantoprazole    Tablet 40 milliGRAM(s) Oral before breakfast  polyethylene glycol 3350 17 Gram(s) Oral daily  senna 2 Tablet(s) Oral at bedtime    Drug Dosing Weight  Height (cm): 162.6 (01 Dec 2020 09:53)  Weight (kg): 85.7 (01 Dec 2020 09:53)  BMI (kg/m2): 32.4 (01 Dec 2020 09:53)  BSA (m2): 1.91 (01 Dec 2020 09:53)    CENTRAL LINE: [ ] YES [x ] NO  LOCATION:   DATE INSERTED:  REMOVE: [ ] YES [ ] NO  EXPLAIN:    DONALD: [ ] YES [ x] NO    DATE INSERTED:  REMOVE:  [ ] YES [ ] NO  EXPLAIN:    A-LINE:  [ ] YES [x ] NO  LOCATION:   DATE INSERTED:  REMOVE:  [ ] YES [ ] NO  EXPLAIN:    PMH -reviewed admission note, no change since admission  PAST MEDICAL & SURGICAL HISTORY:  Lumbar back pain    Osteoporosis    History of tracheal stenosis    Other specified diseases of upper respiratory tract    Hypothyroidism    Kidney stones    Gallstones    Fatty liver    Thyroid disease    Obesity    Constipation    Gastric ulcer  February 2016- h/o GI bleed which patient had to be intubated Jan 2016    Arthritis of shoulder region, right    Arthritis  right shoulder    S/P bronchoscopy  2017    S/P bronchoscopy  5/16    Hyperparathyroidism  2012 parathyroidectomy    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    Gastric ulcer  &quot;four endoscopies&quot; for diagnosis of gastric ulcer in February 2016        ICU Vital Signs Last 24 Hrs  T(C): 35.9 (10 Dec 2020 00:00), Max: 36.6 (09 Dec 2020 15:00)  T(F): 96.7 (10 Dec 2020 00:00), Max: 97.9 (09 Dec 2020 15:00)  HR: 74 (10 Dec 2020 06:00) (67 - 78)  BP: 119/74 (10 Dec 2020 06:00) (112/65 - 139/81)  BP(mean): 84 (10 Dec 2020 06:00) (62 - 95)  RR: 41 (10 Dec 2020 06:00) (24 - 42)  SpO2: 99% (10 Dec 2020 06:00) (86% - 99%)      ABG - ( 10 Dec 2020 04:18 )  pH, Arterial: 7.43  pH, Blood: x     /  pCO2: 44    /  pO2: 55    / HCO3: 29    / Base Excess: 4.8   /  SaO2: 87                    12-09 @ 07:01  -  12-10 @ 07:00  --------------------------------------------------------  IN: 375 mL / OUT: 1000 mL / NET: -625 mL            PHYSICAL EXAM:    GENERAL: [x ]NAD, [ x]well-groomed, [x ]well-developed , Anxious  HEAD:  [x ]Atraumatic, [ ]Normocephalic  EYES: [ x]EOMI, [x ]PERRLA, [ ]conjunctiva and sclera clear  ENMT: [ x]No tonsillar erythema, exudates, or enlargement; [x ]Moist mucous membranes, [ ]Good dentition, [ ]No lesions  NECK: [x ]Supple, normal appearance, [ ]No JVD; [ ]Normal thyroid; [ ]Trachea midline  NERVOUS SYSTEM:  [x ]Alert & Oriented X3, [ x]Good concentration; [ ]Motor Strength 5/5 B/L upper and lower extremities; [ ]DTRs 2+ intact and symmetric  CHEST/LUNG: [x]No chest deformity; [x ]Normal percussion bilaterally; B/L crackles  HEART: [ ]Regular rate and rhythm; [ ]No murmurs, rubs, or gallops  ABDOMEN: [ x]Soft, Nontender, Nondistended; [ x]Bowel sounds present  EXTREMITIES:  [ x]2+ Peripheral Pulses, [x ]No clubbing, cyanosis, or edema  SKIN: [x ]No rashes or lesions; [x ]Good capillary refill      LABS:  CBC Full  -  ( 10 Dec 2020 06:47 )  WBC Count : 13.29 K/uL  RBC Count : 4.19 M/uL  Hemoglobin : 13.0 g/dL  Hematocrit : 40.1 %  Platelet Count - Automated : 385 K/uL  Mean Cell Volume : 95.7 fl  Mean Cell Hemoglobin : 31.0 pg  Mean Cell Hemoglobin Concentration : 32.4 gm/dL  Auto Neutrophil # : 12.19 K/uL  Auto Lymphocyte # : 0.57 K/uL  Auto Monocyte # : 0.36 K/uL  Auto Eosinophil # : 0.07 K/uL  Auto Basophil # : 0.01 K/uL  Auto Neutrophil % : 91.7 %  Auto Lymphocyte % : 4.3 %  Auto Monocyte % : 2.7 %  Auto Eosinophil % : 0.5 %  Auto Basophil % : 0.1 %    12-10    135  |  99  |  16  ----------------------------<  106<H>  4.0   |  29  |  0.45<L>    Ca    10.0      10 Dec 2020 06:47  Phos  2.3     12-10  Mg     2.2     12-10    TPro  6.6  /  Alb  2.1<L>  /  TBili  0.4  /  DBili  x   /  AST  19  /  ALT  105<H>  /  AlkPhos  158<H>  12-10            RADIOLOGY & ADDITIONAL STUDIES REVIEWED:  yes    [ ]GOALS OF CARE DISCUSSION WITH PATIENT/FAMILY/PROXY:    CRITICAL CARE TIME SPENT: 35 minutes

## 2020-12-10 NOTE — PROGRESS NOTE ADULT - SUBJECTIVE AND OBJECTIVE BOX
Patient is a 60y old  Female who presents with a chief complaint of COVID PNA requiring oxygen supplementation (09 Dec 2020 12:46)    pt seen in icu [ x ], reg med floor [   ], bed [ x ], chair at bedside [   ], a+o x3 [ x ], lethargic [  ],    nad [x  ]        Allergies    No Known Allergies        Vitals    T(F): 96.7 (12-10-20 @ 00:00), Max: 98.6 (12-09-20 @ 07:00)  HR: 74 (12-10-20 @ 06:00) (67 - 78)  BP: 119/74 (12-10-20 @ 06:00) (107/80 - 139/81)  RR: 41 (12-10-20 @ 06:00) (24 - 42)  SpO2: 99% (12-10-20 @ 06:00) (86% - 99%)  Wt(kg): --  CAPILLARY BLOOD GLUCOSE      POCT Blood Glucose.: 117 mg/dL (10 Dec 2020 06:00)      Labs                          13.0   18.48 )-----------( 459      ( 09 Dec 2020 06:15 )             40.2       12-09    135  |  98  |  18  ----------------------------<  158<H>  4.3   |  26  |  0.50    Ca    10.5      09 Dec 2020 06:15  Phos  2.3     12-09  Mg     2.4     12-09    TPro  6.8  /  Alb  2.1<L>  /  TBili  0.3  /  DBili  x   /  AST  24  /  ALT  165<H>  /  AlkPhos  178<H>  12-09                Radiology Results      Meds    MEDICATIONS  (STANDING):  ALBUTerol    90 MICROgram(s) HFA Inhaler 2 Puff(s) Inhalation every 6 hours  ALPRAZolam 1 milliGRAM(s) Oral every 12 hours  chlorhexidine 2% Cloths 1 Application(s) Topical <User Schedule>  dexAMETHasone     Tablet 6 milliGRAM(s) Oral daily  dextrose 5%. 1000 milliLiter(s) (100 mL/Hr) IV Continuous <Continuous>  dextrose 50% Injectable 12.5 Gram(s) IV Push once  dextrose 50% Injectable 25 Gram(s) IV Push once  enoxaparin Injectable 40 milliGRAM(s) SubCutaneous daily  glucagon  Injectable 1 milliGRAM(s) IntraMuscular once  insulin lispro (ADMELOG) corrective regimen sliding scale   SubCutaneous three times a day before meals  levothyroxine 100 MICROGram(s) Oral daily  pantoprazole    Tablet 40 milliGRAM(s) Oral before breakfast  polyethylene glycol 3350 17 Gram(s) Oral daily  senna 2 Tablet(s) Oral at bedtime      MEDICATIONS  (PRN):  acetaminophen   Tablet .. 650 milliGRAM(s) Oral every 6 hours PRN Temp greater or equal to 38C (100.4F), Mild Pain (1 - 3)      Physical Exam    Neuro :  no focal deficits  Respiratory: CTA B/L  CV: RRR, S1S2, no murmurs,   Abdominal: Soft, NT, ND +BS,  Extremities: No edema, + peripheral pulses          ASSESSMENT    Infection due to severe acute respiratory syndrome coronavirus 2 (SARS-CoV-2)  pneumonia,   hypoxia,   h/o hypothyroid,   sleep apnea,   PUD,   cervical OA   tracheal resection and reconstruction,   uterus myoma,   HTN,   gi bleed  Osteoporosis  Hyperparathyroidism      PLAN    id f/u   Cont Decadron 6mgs iv q24hrs  completed Remdesivir 12/06/20  s/p convalescent plasma  cont decadron,   cont contact and airborne isolation,   cont albuterol inhaler,   cont tylenol prn, robitussin prn   pulm f/u   O2 sat (80% - 95%) on hiflo   bipap as needed,   pt afebrile  dvt prophylaxis  covid-19 antibody test neg,   resp viral panel positive for covid 19,   procalcitonin, D-dimer wnl noted     esr, crp, ldh, ferritin elevated noted    endo f/u  hypercalcemia due to hyperparathyroidism  high vitamin d 1-25 ? etiology-   hx of parathyroid surgery r/o pth hyperplasia  s/p one dose of iv aredia 90mg   cont to monitor calcium  keep pt hydrated   cont current meds  mgmt as per icu           Patient is a 60y old  Female who presents with a chief complaint of COVID PNA requiring oxygen supplementation (09 Dec 2020 12:46)    pt seen in icu [ x ], reg med floor [   ], bed [ x ], chair at bedside [   ], a+o x3 [ x ], lethargic [  ],    nad [x  ]        Allergies    No Known Allergies        Vitals    T(F): 96.7 (12-10-20 @ 00:00), Max: 98.6 (12-09-20 @ 07:00)  HR: 74 (12-10-20 @ 06:00) (67 - 78)  BP: 119/74 (12-10-20 @ 06:00) (107/80 - 139/81)  RR: 41 (12-10-20 @ 06:00) (24 - 42)  SpO2: 99% (12-10-20 @ 06:00) (86% - 99%)  Wt(kg): --  CAPILLARY BLOOD GLUCOSE      POCT Blood Glucose.: 117 mg/dL (10 Dec 2020 06:00)      Labs                          13.0   18.48 )-----------( 459      ( 09 Dec 2020 06:15 )             40.2       12-09    135  |  98  |  18  ----------------------------<  158<H>  4.3   |  26  |  0.50    Ca    10.5      09 Dec 2020 06:15  Phos  2.3     12-09  Mg     2.4     12-09    TPro  6.8  /  Alb  2.1<L>  /  TBili  0.3  /  DBili  x   /  AST  24  /  ALT  165<H>  /  AlkPhos  178<H>  12-09                Radiology Results      Meds    MEDICATIONS  (STANDING):  ALBUTerol    90 MICROgram(s) HFA Inhaler 2 Puff(s) Inhalation every 6 hours  ALPRAZolam 1 milliGRAM(s) Oral every 12 hours  chlorhexidine 2% Cloths 1 Application(s) Topical <User Schedule>  dexAMETHasone     Tablet 6 milliGRAM(s) Oral daily  dextrose 5%. 1000 milliLiter(s) (100 mL/Hr) IV Continuous <Continuous>  dextrose 50% Injectable 12.5 Gram(s) IV Push once  dextrose 50% Injectable 25 Gram(s) IV Push once  enoxaparin Injectable 40 milliGRAM(s) SubCutaneous daily  glucagon  Injectable 1 milliGRAM(s) IntraMuscular once  insulin lispro (ADMELOG) corrective regimen sliding scale   SubCutaneous three times a day before meals  levothyroxine 100 MICROGram(s) Oral daily  pantoprazole    Tablet 40 milliGRAM(s) Oral before breakfast  polyethylene glycol 3350 17 Gram(s) Oral daily  senna 2 Tablet(s) Oral at bedtime      MEDICATIONS  (PRN):  acetaminophen   Tablet .. 650 milliGRAM(s) Oral every 6 hours PRN Temp greater or equal to 38C (100.4F), Mild Pain (1 - 3)      Physical Exam    Neuro :  no focal deficits  Respiratory: CTA B/L  CV: RRR, S1S2, no murmurs,   Abdominal: Soft, NT, ND +BS,  Extremities: No edema, + peripheral pulses          ASSESSMENT    Infection due to severe acute respiratory syndrome coronavirus 2 (SARS-CoV-2)  pneumonia,   hypoxia,   h/o hypothyroid,   sleep apnea,   PUD,   cervical OA   tracheal resection and reconstruction,   uterus myoma,   HTN,   gi bleed  Osteoporosis  Hyperparathyroidism      PLAN    id f/u   Cont Decadron 6mgs iv q24hrs  completed Remdesivir 12/06/20  s/p convalescent plasma  cont decadron,   cont contact and airborne isolation,   cont albuterol inhaler,   cont tylenol prn, robitussin prn   pulm f/u   O2 sat ((86% - 99%)) on hiflo   bipap as needed,   pt afebrile  dvt prophylaxis  covid-19 antibody test neg,   resp viral panel positive for covid 19,   procalcitonin, D-dimer wnl noted     esr, crp, ldh, ferritin elevated noted    endo f/u  hypercalcemia due to hyperparathyroidism  high vitamin d 1-25 ? etiology-   f/u ace levs  hx of parathyroid surgery r/o pth hyperplasia  s/p one dose of iv aredia 90mg   calcium improving  cont to monitor calcium  keep pt hydrated   cont current meds  mgmt as per icu

## 2020-12-11 NOTE — PROGRESS NOTE ADULT - SUBJECTIVE AND OBJECTIVE BOX
INTERVAL HPI/OVERNIGHT EVENTS: ***    PRESSORS: [ ] YES [ ] NO    Antimicrobial:    Cardiovascular:    Pulmonary:  ALBUTerol    90 MICROgram(s) HFA Inhaler 2 Puff(s) Inhalation every 6 hours    Hematalogic:  enoxaparin Injectable 40 milliGRAM(s) SubCutaneous daily    Other:  acetaminophen   Tablet .. 650 milliGRAM(s) Oral every 6 hours PRN  ALPRAZolam 1 milliGRAM(s) Oral every 12 hours  chlorhexidine 2% Cloths 1 Application(s) Topical <User Schedule>  dextrose 5%. 1000 milliLiter(s) IV Continuous <Continuous>  dextrose 50% Injectable 25 Gram(s) IV Push once  dextrose 50% Injectable 12.5 Gram(s) IV Push once  glucagon  Injectable 1 milliGRAM(s) IntraMuscular once  insulin lispro (ADMELOG) corrective regimen sliding scale   SubCutaneous three times a day before meals  levothyroxine 100 MICROGram(s) Oral daily  melatonin 5 milliGRAM(s) Oral at bedtime  pantoprazole    Tablet 40 milliGRAM(s) Oral before breakfast  polyethylene glycol 3350 17 Gram(s) Oral daily  senna 2 Tablet(s) Oral at bedtime    acetaminophen   Tablet .. 650 milliGRAM(s) Oral every 6 hours PRN  ALBUTerol    90 MICROgram(s) HFA Inhaler 2 Puff(s) Inhalation every 6 hours  ALPRAZolam 1 milliGRAM(s) Oral every 12 hours  chlorhexidine 2% Cloths 1 Application(s) Topical <User Schedule>  dextrose 5%. 1000 milliLiter(s) IV Continuous <Continuous>  dextrose 50% Injectable 25 Gram(s) IV Push once  dextrose 50% Injectable 12.5 Gram(s) IV Push once  enoxaparin Injectable 40 milliGRAM(s) SubCutaneous daily  glucagon  Injectable 1 milliGRAM(s) IntraMuscular once  insulin lispro (ADMELOG) corrective regimen sliding scale   SubCutaneous three times a day before meals  levothyroxine 100 MICROGram(s) Oral daily  melatonin 5 milliGRAM(s) Oral at bedtime  pantoprazole    Tablet 40 milliGRAM(s) Oral before breakfast  polyethylene glycol 3350 17 Gram(s) Oral daily  senna 2 Tablet(s) Oral at bedtime    Drug Dosing Weight  Height (cm): 162.6 (01 Dec 2020 09:53)  Weight (kg): 85.7 (01 Dec 2020 09:53)  BMI (kg/m2): 32.4 (01 Dec 2020 09:53)  BSA (m2): 1.91 (01 Dec 2020 09:53)    CENTRAL LINE: [ ] YES [ ] NO  LOCATION:   DATE INSERTED:  REMOVE: [ ] YES [ ] NO  EXPLAIN:    DONALD: [ ] YES [ ] NO    DATE INSERTED:  REMOVE:  [ ] YES [ ] NO  EXPLAIN:    A-LINE:  [ ] YES [ ] NO  LOCATION:   DATE INSERTED:  REMOVE:  [ ] YES [ ] NO  EXPLAIN:    PMH -reviewed admission note, no change since admission      ABG - ( 10 Dec 2020 04:18 )  pH, Arterial: 7.43  pH, Blood: x     /  pCO2: 44    /  pO2: 55    / HCO3: 29    / Base Excess: 4.8   /  SaO2: 87                    12-10 @ 07:01  -  12-11 @ 07:00  --------------------------------------------------------  IN: 240 mL / OUT: 600 mL / NET: -360 mL            PHYSICAL EXAM:    GENERAL: NAD, well-groomed, well-developed  HEAD:  Atraumatic, Normocephalic  EYES: EOMI, PERRLA, conjunctiva and sclera clear  ENMT: No tonsillar erythema, exudates, or enlargement; Moist mucous membranes, Good dentition, [ ]No lesions  NECK: Supple, normal appearance, No JVD; Normal thyroid; Trachea midline  NERVOUS SYSTEM:  Alert & Oriented X3, Good concentration; Motor Strength 5/5 B/L upper and lower extremities; DTRs 2+ intact and symmetric  CHEST/LUNG: No chest deformity; Normal percussion bilaterally; No rales, rhonchi, wheezing   HEART: Regular rate and rhythm; No murmurs, rubs, or gallops  ABDOMEN: Soft, Nontender, Nondistended;Bowel sounds present  EXTREMITIES:  2+ Peripheral Pulses, No clubbing, cyanosis, or edema  LYMPH: No lymphadenopathy noted  SKIN: No rashes or lesions; Good capillary refill      LABS:  CBC Full  -  ( 11 Dec 2020 06:32 )  WBC Count : 18.29 K/uL  RBC Count : 4.14 M/uL  Hemoglobin : 12.9 g/dL  Hematocrit : 39.6 %  Platelet Count - Automated : 393 K/uL  Mean Cell Volume : 95.7 fl  Mean Cell Hemoglobin : 31.2 pg  Mean Cell Hemoglobin Concentration : 32.6 gm/dL  Auto Neutrophil # : x  Auto Lymphocyte # : x  Auto Monocyte # : x  Auto Eosinophil # : x  Auto Basophil # : x  Auto Neutrophil % : x  Auto Lymphocyte % : x  Auto Monocyte % : x  Auto Eosinophil % : x  Auto Basophil % : x    12-11    135  |  99  |  17  ----------------------------<  192<H>  4.4   |  30  |  0.54    Ca    10.3      11 Dec 2020 06:32  Phos  2.3     12-11  Mg     2.4     12-11    TPro  6.6  /  Alb  2.0<L>  /  TBili  0.3  /  DBili  x   /  AST  15  /  ALT  73<H>  /  AlkPhos  138<H>  12-11            RADIOLOGY & ADDITIONAL STUDIES REVIEWED:  ***    [ ]GOALS OF CARE DISCUSSION WITH PATIENT/FAMILY/PROXY:    CRITICAL CARE TIME SPENT: 35 minutes INTERVAL HPI/OVERNIGHT EVENTS: Patient remains on HFNC. Patient has been tachypneic to 30s. No acute events overnight.     PRESSORS: [ ] YES [x ] NO    Antimicrobial:    Cardiovascular:    Pulmonary:  ALBUTerol    90 MICROgram(s) HFA Inhaler 2 Puff(s) Inhalation every 6 hours    Hematalogic:  enoxaparin Injectable 40 milliGRAM(s) SubCutaneous daily    Other:  acetaminophen   Tablet .. 650 milliGRAM(s) Oral every 6 hours PRN  ALPRAZolam 1 milliGRAM(s) Oral every 12 hours  chlorhexidine 2% Cloths 1 Application(s) Topical <User Schedule>  dextrose 5%. 1000 milliLiter(s) IV Continuous <Continuous>  dextrose 50% Injectable 25 Gram(s) IV Push once  dextrose 50% Injectable 12.5 Gram(s) IV Push once  glucagon  Injectable 1 milliGRAM(s) IntraMuscular once  insulin lispro (ADMELOG) corrective regimen sliding scale   SubCutaneous three times a day before meals  levothyroxine 100 MICROGram(s) Oral daily  melatonin 5 milliGRAM(s) Oral at bedtime  pantoprazole    Tablet 40 milliGRAM(s) Oral before breakfast  polyethylene glycol 3350 17 Gram(s) Oral daily  senna 2 Tablet(s) Oral at bedtime    acetaminophen   Tablet .. 650 milliGRAM(s) Oral every 6 hours PRN  ALBUTerol    90 MICROgram(s) HFA Inhaler 2 Puff(s) Inhalation every 6 hours  ALPRAZolam 1 milliGRAM(s) Oral every 12 hours  chlorhexidine 2% Cloths 1 Application(s) Topical <User Schedule>  dextrose 5%. 1000 milliLiter(s) IV Continuous <Continuous>  dextrose 50% Injectable 25 Gram(s) IV Push once  dextrose 50% Injectable 12.5 Gram(s) IV Push once  enoxaparin Injectable 40 milliGRAM(s) SubCutaneous daily  glucagon  Injectable 1 milliGRAM(s) IntraMuscular once  insulin lispro (ADMELOG) corrective regimen sliding scale   SubCutaneous three times a day before meals  levothyroxine 100 MICROGram(s) Oral daily  melatonin 5 milliGRAM(s) Oral at bedtime  pantoprazole    Tablet 40 milliGRAM(s) Oral before breakfast  polyethylene glycol 3350 17 Gram(s) Oral daily  senna 2 Tablet(s) Oral at bedtime    Drug Dosing Weight  Height (cm): 162.6 (01 Dec 2020 09:53)  Weight (kg): 85.7 (01 Dec 2020 09:53)  BMI (kg/m2): 32.4 (01 Dec 2020 09:53)  BSA (m2): 1.91 (01 Dec 2020 09:53)    CENTRAL LINE: [ ] YES [x ] NO  LOCATION:   DATE INSERTED:  REMOVE: [ ] YES [ ] NO  EXPLAIN:    DONALD: [ ] YES [x ] NO    DATE INSERTED:  REMOVE:  [ ] YES [ ] NO  EXPLAIN:    A-LINE:  [ ] YES [ x] NO  LOCATION:   DATE INSERTED:  REMOVE:  [ ] YES [ ] NO  EXPLAIN:    PMH -reviewed admission note, no change since admission      ABG - ( 10 Dec 2020 04:18 )  pH, Arterial: 7.43  pH, Blood: x     /  pCO2: 44    /  pO2: 55    / HCO3: 29    / Base Excess: 4.8   /  SaO2: 87                    12-10 @ 07:01  -  12-11 @ 07:00  --------------------------------------------------------  IN: 240 mL / OUT: 600 mL / NET: -360 mL            PHYSICAL EXAM:    GENERAL: NAD, well-groomed, anxious  HEAD:  Atraumatic, Normocephalic  EYES: EOMI, PERRLA, conjunctiva and sclera clear  ENMT: No tonsillar erythema, exudates, or enlargement; Moist mucous membranes, Good dentition, [ ]No lesions  NECK: Supple, normal appearance, No JVD; Normal thyroid; Trachea midline  NERVOUS SYSTEM:  Alert & Oriented X3  CHEST/LUNG: No chest deformity; Normal percussion bilaterally; No rales, rhonchi, wheezing   HEART: Regular rate and rhythm; No murmurs, rubs, or gallops  ABDOMEN: Soft, Nontender, Nondistended;Bowel sounds present  EXTREMITIES:  2+ Peripheral Pulses, No clubbing, cyanosis, or edema  LYMPH: No lymphadenopathy noted  SKIN: No rashes or lesions; Good capillary refill      LABS:  CBC Full  -  ( 11 Dec 2020 06:32 )  WBC Count : 18.29 K/uL  RBC Count : 4.14 M/uL  Hemoglobin : 12.9 g/dL  Hematocrit : 39.6 %  Platelet Count - Automated : 393 K/uL  Mean Cell Volume : 95.7 fl  Mean Cell Hemoglobin : 31.2 pg  Mean Cell Hemoglobin Concentration : 32.6 gm/dL  Auto Neutrophil # : x  Auto Lymphocyte # : x  Auto Monocyte # : x  Auto Eosinophil # : x  Auto Basophil # : x  Auto Neutrophil % : x  Auto Lymphocyte % : x  Auto Monocyte % : x  Auto Eosinophil % : x  Auto Basophil % : x    12-11    135  |  99  |  17  ----------------------------<  192<H>  4.4   |  30  |  0.54    Ca    10.3      11 Dec 2020 06:32  Phos  2.3     12-11  Mg     2.4     12-11    TPro  6.6  /  Alb  2.0<L>  /  TBili  0.3  /  DBili  x   /  AST  15  /  ALT  73<H>  /  AlkPhos  138<H>  12-11            RADIOLOGY & ADDITIONAL STUDIES REVIEWED:  ***    [ ]GOALS OF CARE DISCUSSION WITH PATIENT/FAMILY/PROXY:    CRITICAL CARE TIME SPENT: 35 minutes

## 2020-12-11 NOTE — PROGRESS NOTE ADULT - SUBJECTIVE AND OBJECTIVE BOX
Patient is a 60y old  Female who presents with a chief complaint of COVID PNA requiring oxygen supplementation (10 Dec 2020 10:57)    pt seen in icu [ x ], reg med floor [   ], bed [ x ], chair at bedside [   ], a+o x3 [ x ], lethargic [  ],    nad [x  ]        Allergies    No Known Allergies        Vitals    T(F): 97 (12-11-20 @ 04:00), Max: 98.6 (12-10-20 @ 07:00)  HR: 78 (12-11-20 @ 06:00) (63 - 90)  BP: 92/72 (12-11-20 @ 06:00) (92/72 - 152/84)  RR: 28 (12-11-20 @ 06:00) (20 - 41)  SpO2: 92% (12-11-20 @ 06:00) (86% - 95%)  Wt(kg): --  CAPILLARY BLOOD GLUCOSE      POCT Blood Glucose.: 199 mg/dL (11 Dec 2020 06:14)      Labs                          12.9   18.29 )-----------( 393      ( 11 Dec 2020 06:32 )             39.6       12-10    135  |  99  |  16  ----------------------------<  106<H>  4.0   |  29  |  0.45<L>    Ca    10.0      10 Dec 2020 06:47  Phos  2.3     12-10  Mg     2.2     12-10    TPro  6.6  /  Alb  2.1<L>  /  TBili  0.4  /  DBili  x   /  AST  19  /  ALT  105<H>  /  AlkPhos  158<H>  12-10                Radiology Results      Meds    MEDICATIONS  (STANDING):  ALBUTerol    90 MICROgram(s) HFA Inhaler 2 Puff(s) Inhalation every 6 hours  ALPRAZolam 1 milliGRAM(s) Oral every 12 hours  chlorhexidine 2% Cloths 1 Application(s) Topical <User Schedule>  dextrose 5%. 1000 milliLiter(s) (100 mL/Hr) IV Continuous <Continuous>  dextrose 50% Injectable 25 Gram(s) IV Push once  dextrose 50% Injectable 12.5 Gram(s) IV Push once  enoxaparin Injectable 40 milliGRAM(s) SubCutaneous daily  glucagon  Injectable 1 milliGRAM(s) IntraMuscular once  insulin lispro (ADMELOG) corrective regimen sliding scale   SubCutaneous three times a day before meals  levothyroxine 100 MICROGram(s) Oral daily  melatonin 5 milliGRAM(s) Oral at bedtime  pantoprazole    Tablet 40 milliGRAM(s) Oral before breakfast  polyethylene glycol 3350 17 Gram(s) Oral daily  senna 2 Tablet(s) Oral at bedtime      MEDICATIONS  (PRN):  acetaminophen   Tablet .. 650 milliGRAM(s) Oral every 6 hours PRN Temp greater or equal to 38C (100.4F), Mild Pain (1 - 3)      Physical Exam    Neuro :  no focal deficits  Respiratory: CTA B/L  CV: RRR, S1S2, no murmurs,   Abdominal: Soft, NT, ND +BS,  Extremities: No edema, + peripheral pulses          ASSESSMENT    Infection due to severe acute respiratory syndrome coronavirus 2 (SARS-CoV-2)  pneumonia,   hypoxia,   h/o hypothyroid,   sleep apnea,   PUD,   cervical OA   tracheal resection and reconstruction,   uterus myoma,   HTN,   gi bleed  Osteoporosis  Hyperparathyroidism      PLAN    id f/u   Cont Decadron 6mgs iv q24hrs  completed Remdesivir 12/06/20  s/p convalescent plasma  cont decadron,   cont contact and airborne isolation,   cont albuterol inhaler,   cont tylenol prn, robitussin prn   pulm f/u   O2 sat ((86% - 99%)) on hiflo   bipap as needed,   pt afebrile  dvt prophylaxis  covid-19 antibody test neg,   resp viral panel positive for covid 19,   procalcitonin, D-dimer wnl noted     esr, crp, ldh, ferritin elevated noted    endo f/u  hypercalcemia due to hyperparathyroidism  high vitamin d 1-25 ? etiology-   f/u ace levs  hx of parathyroid surgery r/o pth hyperplasia  s/p one dose of iv aredia 90mg   calcium improving  cont to monitor calcium  keep pt hydrated   cont current meds  mgmt as per icu         Patient is a 60y old  Female who presents with a chief complaint of COVID PNA requiring oxygen supplementation (10 Dec 2020 10:57)    pt seen in icu [ x ], reg med floor [   ], bed [ x ], chair at bedside [   ], a+o x3 [ x ], lethargic [  ],    nad [x  ]        Allergies    No Known Allergies        Vitals    T(F): 97 (12-11-20 @ 04:00), Max: 98.6 (12-10-20 @ 07:00)  HR: 78 (12-11-20 @ 06:00) (63 - 90)  BP: 92/72 (12-11-20 @ 06:00) (92/72 - 152/84)  RR: 28 (12-11-20 @ 06:00) (20 - 41)  SpO2: 92% (12-11-20 @ 06:00) (86% - 95%)  Wt(kg): --  CAPILLARY BLOOD GLUCOSE      POCT Blood Glucose.: 199 mg/dL (11 Dec 2020 06:14)      Labs                          12.9   18.29 )-----------( 393      ( 11 Dec 2020 06:32 )             39.6       12-10    135  |  99  |  16  ----------------------------<  106<H>  4.0   |  29  |  0.45<L>    Ca    10.0      10 Dec 2020 06:47  Phos  2.3     12-10  Mg     2.2     12-10    TPro  6.6  /  Alb  2.1<L>  /  TBili  0.4  /  DBili  x   /  AST  19  /  ALT  105<H>  /  AlkPhos  158<H>  12-10      Angiotensin Converting Enzyme, Serum (12.08.20 @ 10:14)   Angiotensin Converting Enzyme, Serum: 19: Performed At: RN [facility] [place] `          Radiology Results      Meds    MEDICATIONS  (STANDING):  ALBUTerol    90 MICROgram(s) HFA Inhaler 2 Puff(s) Inhalation every 6 hours  ALPRAZolam 1 milliGRAM(s) Oral every 12 hours  chlorhexidine 2% Cloths 1 Application(s) Topical <User Schedule>  dextrose 5%. 1000 milliLiter(s) (100 mL/Hr) IV Continuous <Continuous>  dextrose 50% Injectable 25 Gram(s) IV Push once  dextrose 50% Injectable 12.5 Gram(s) IV Push once  enoxaparin Injectable 40 milliGRAM(s) SubCutaneous daily  glucagon  Injectable 1 milliGRAM(s) IntraMuscular once  insulin lispro (ADMELOG) corrective regimen sliding scale   SubCutaneous three times a day before meals  levothyroxine 100 MICROGram(s) Oral daily  melatonin 5 milliGRAM(s) Oral at bedtime  pantoprazole    Tablet 40 milliGRAM(s) Oral before breakfast  polyethylene glycol 3350 17 Gram(s) Oral daily  senna 2 Tablet(s) Oral at bedtime      MEDICATIONS  (PRN):  acetaminophen   Tablet .. 650 milliGRAM(s) Oral every 6 hours PRN Temp greater or equal to 38C (100.4F), Mild Pain (1 - 3)      Physical Exam    Neuro :  no focal deficits  Respiratory: CTA B/L  CV: RRR, S1S2, no murmurs,   Abdominal: Soft, NT, ND +BS,  Extremities: No edema, + peripheral pulses          ASSESSMENT    Infection due to severe acute respiratory syndrome coronavirus 2 (SARS-CoV-2)  pneumonia,   hypoxia,   h/o hypothyroid,   sleep apnea,   PUD,   cervical OA   tracheal resection and reconstruction,   uterus myoma,   HTN,   gi bleed  Osteoporosis  Hyperparathyroidism      PLAN    id f/u   Decadron 6mgs iv q24hrs now on taper  completed Remdesivir 12/06/20  s/p convalescent plasma  cont contact and airborne isolation,   cont albuterol inhaler,   cont tylenol prn, robitussin prn   pulm f/u   O2 sat (86% - 95%) on hiflo   bipap as needed,   pt afebrile  dvt prophylaxis  covid-19 antibody test neg,   resp viral panel positive for covid 19,   procalcitonin, D-dimer wnl noted     esr, crp, ldh, ferritin elevated noted    endo f/u  hypercalcemia due to hyperparathyroidism  high vitamin d 1-25 ? etiology-   ace19  hx of parathyroid surgery r/o pth hyperplasia  s/p one dose of iv aredia 90mg   calcium improving  cont to monitor calcium  keep pt hydrated   cont current meds  mgmt as per icu

## 2020-12-11 NOTE — PROGRESS NOTE ADULT - ASSESSMENT
1. PNA 2nd to Covid-19  - PCR positive. IGG Negative.   - CXR noted.  - Continue abx  - Continue Vit C, Vit D, Zinc   - completed Dexamethasone   - Completed Remdesivir   - Continue Singulair and Pepcid   - Robitussin PRN for cough  - Tylenol PRN for temp   - O2 Supp - HFNC; low threshold for BIPAP.  - ICU management.   - Monitor O2 Sat.  - Monitor LFTs, LDH, D-Dimer, Ferritin, CRP and procalcitonin  - F.U CXR  - Prone positioning as tolerated   - DVT and GI PPX     2. DARRELL   - Newly diagnosed.   - Currently being treated for Covid-19.    3. Gastric Ulcer  - Stool Studies  - Monitor labs  - Continue meds  - GI eval.     4. Hx of Intubation   - S/P intubation x 5 days in 2016 for GIB  - Trachea was damaged during intubation.  - S/P tracheal resection and reconstruction.   - S/P surveillance bronchoscopies   - Request resuscitation measures if needed but wants to be DNI

## 2020-12-11 NOTE — PROGRESS NOTE ADULT - SUBJECTIVE AND OBJECTIVE BOX
Pt is awake, alert, lying in bed - on HFNC. Saturating 89% this AM.     INTERVAL HPI/OVERNIGHT EVENTS:      VITAL SIGNS:  T(F): 97 (12-11-20 @ 04:00)  HR: 79 (12-11-20 @ 11:39)  BP: 129/72 (12-11-20 @ 07:00)  RR: 37 (12-11-20 @ 07:00)  SpO2: 89% (12-11-20 @ 11:39)  Wt(kg): --  I&O's Detail    10 Dec 2020 07:01  -  11 Dec 2020 07:00  --------------------------------------------------------  IN:    Oral Fluid: 240 mL  Total IN: 240 mL    OUT:    Voided (mL): 600 mL  Total OUT: 600 mL    Total NET: -360 mL              REVIEW OF SYSTEMS:    CONSTITUTIONAL:  No fevers, chills, sweats    HEENT:  Eyes:  No diplopia or blurred vision. ENT:  No earache, sore throat or runny nose.    CARDIOVASCULAR:  No pressure, squeezing, tightness, or heaviness about the chest; no palpitations.    RESPIRATORY:  Per HPI    GASTROINTESTINAL:  No abdominal pain, nausea, vomiting or diarrhea.    GENITOURINARY:  No dysuria, frequency or urgency.    NEUROLOGIC:  No paresthesias, fasciculations, seizures or weakness.    PSYCHIATRIC:  No disorder of thought or mood.      PHYSICAL EXAM:    Constitutional: Well developed and nourished  Eyes:Perrla  ENMT: normal  Neck:supple  Respiratory: good air entry; HFNC   Cardiovascular: S1 S2 regular  Gastrointestinal: Soft, Non tender  Extremities: No edema  Vascular:normal  Neurological:Awake, alert,Ox3  Musculoskeletal:Normal      MEDICATIONS  (STANDING):  ALBUTerol    90 MICROgram(s) HFA Inhaler 2 Puff(s) Inhalation every 6 hours  ALPRAZolam 1 milliGRAM(s) Oral every 12 hours  chlorhexidine 2% Cloths 1 Application(s) Topical <User Schedule>  dextrose 5%. 1000 milliLiter(s) (100 mL/Hr) IV Continuous <Continuous>  dextrose 50% Injectable 25 Gram(s) IV Push once  dextrose 50% Injectable 12.5 Gram(s) IV Push once  enoxaparin Injectable 40 milliGRAM(s) SubCutaneous daily  glucagon  Injectable 1 milliGRAM(s) IntraMuscular once  insulin lispro (ADMELOG) corrective regimen sliding scale   SubCutaneous three times a day before meals  levothyroxine 100 MICROGram(s) Oral daily  melatonin 5 milliGRAM(s) Oral at bedtime  pantoprazole    Tablet 40 milliGRAM(s) Oral before breakfast  polyethylene glycol 3350 17 Gram(s) Oral daily  senna 2 Tablet(s) Oral at bedtime    MEDICATIONS  (PRN):  acetaminophen   Tablet .. 650 milliGRAM(s) Oral every 6 hours PRN Temp greater or equal to 38C (100.4F), Mild Pain (1 - 3)      Allergies    No Known Allergies    Intolerances        LABS:                        12.9   18.29 )-----------( 393      ( 11 Dec 2020 06:32 )             39.6     12-11    135  |  99  |  17  ----------------------------<  192<H>  4.4   |  30  |  0.54    Ca    10.3      11 Dec 2020 06:32  Phos  2.3     12-11  Mg     2.4     12-11    TPro  6.6  /  Alb  2.0<L>  /  TBili  0.3  /  DBili  x   /  AST  15  /  ALT  73<H>  /  AlkPhos  138<H>  12-11        ABG - ( 10 Dec 2020 04:18 )  pH, Arterial: 7.43  pH, Blood: x     /  pCO2: 44    /  pO2: 55    / HCO3: 29    / Base Excess: 4.8   /  SaO2: 87                    CAPILLARY BLOOD GLUCOSE      POCT Blood Glucose.: 390 mg/dL (11 Dec 2020 10:41)  POCT Blood Glucose.: 199 mg/dL (11 Dec 2020 06:14)  POCT Blood Glucose.: 245 mg/dL (10 Dec 2020 22:46)  POCT Blood Glucose.: 280 mg/dL (10 Dec 2020 16:12)    pro-bnp -- 12-05 @ 15:42     d-dimer <150  12-05 @ 15:42      RADIOLOGY & ADDITIONAL TESTS:    CXR:    < from: Xray Chest 1 View- PORTABLE-Routine (Xray Chest 1 View- PORTABLE-Routine in AM.) (12.09.20 @ 08:14) >  IMPRESSION:  Progression of infiltrates.    Thank you for the courtesy of this referral.    < end of copied text >  Ct scan chest:    ekg;    echo:

## 2020-12-11 NOTE — PROGRESS NOTE ADULT - SUBJECTIVE AND OBJECTIVE BOX
Interval Events:  pt in nad    Allergies    No Known Allergies    Intolerances      Endocrine/Metabolic Medications:  dextrose 50% Injectable 25 Gram(s) IV Push once  dextrose 50% Injectable 12.5 Gram(s) IV Push once  glucagon  Injectable 1 milliGRAM(s) IntraMuscular once  insulin lispro (ADMELOG) corrective regimen sliding scale   SubCutaneous three times a day before meals  levothyroxine 100 MICROGram(s) Oral daily      Vital Signs Last 24 Hrs  T(C): 36.1 (11 Dec 2020 04:00), Max: 36.3 (10 Dec 2020 22:06)  T(F): 97 (11 Dec 2020 04:00), Max: 97.4 (10 Dec 2020 22:06)  HR: 79 (11 Dec 2020 11:39) (63 - 85)  BP: 129/72 (11 Dec 2020 07:00) (92/72 - 152/84)  BP(mean): 84 (11 Dec 2020 07:00) (61 - 100)  RR: 37 (11 Dec 2020 07:00) (20 - 38)  SpO2: 89% (11 Dec 2020 11:39) (86% - 95%)      PHYSICAL EXAM  All physical exam findings normal, except those marked:  General:	Alert, active, cooperative, NAD, well hydrated  .		[] Abnormal:  Neck		Normal: supple, no cervical adenopathy, no palpable thyroid  .		[] Abnormal:  Cardiovascular	Normal: regular rate, normal S1, S2, no murmurs  .		[] Abnormal:  Respiratory	Normal: no chest wall deformity, normal respiratory pattern, CTA B/L  .		[] Abnormal:  Abdominal	Normal: soft, ND, NT, bowel sounds present, no masses, no organomegaly  .		[] Abnormal:  		Normal normal genitalia, testes descended, circumcised/uncircumcised  .		Lenore stage:			Breast lenore:  .		Menstrual history:  .		[] Abnormal:  Extremities	Normal: FROM x4  .		[] Abnormal:  Skin		Normal: intact and not indurated, no rash, no acanthosis nigricans  .		[] Abnormal:  Neurologic	Normal: grossly intact  .		[] Abnormal:    LABS                        12.9   18.29 )-----------( 393      ( 11 Dec 2020 06:32 )             39.6                               135    |  99     |  17                  Calcium: 10.3  / iCa: x      (12-11 @ 06:32)    ----------------------------<  192       Magnesium: 2.4                              4.4     |  30     |  0.54             Phosphorous: 2.3      TPro  6.6    /  Alb  2.0    /  TBili  0.3    /  DBili  x      /  AST  15     /  ALT  73     /  AlkPhos  138    11 Dec 2020 06:32    CAPILLARY BLOOD GLUCOSE      POCT Blood Glucose.: 390 mg/dL (11 Dec 2020 10:41)  POCT Blood Glucose.: 199 mg/dL (11 Dec 2020 06:14)  POCT Blood Glucose.: 245 mg/dL (10 Dec 2020 22:46)  POCT Blood Glucose.: 280 mg/dL (10 Dec 2020 16:12)        Assesment/plan    59 yo F from home lives with son (who is autistic, covid +) PMHx of hypothyroid, sleep apnea, PUD, cervical OA PSH tracheal resection and reconstruction, uterous myoma, HTN,  presents to the ED c/o shortness of breath x 2 days. Admitted with COVID PNA. Found to have hypercalcemia. Pt has hx of hyperparathyroidism s/p ? parathyroidectomy.       Problem/Recommendation - 1:  Problem: Hypercalcemia. due to hyperparathyroidism  high vitamin d 1-25 ? etiology- nl ACE levels  hx of parathyroid surgery r/o pth hyperplasia  worsening hypercalcemia - s/p one dose of iv aredia 90mg   cont to monitor calcium  d/w hs       Problem/Recommendation - 2:  ·  Problem: Hypothyroidism.  Recommendation: cont lt4 100 mcg  low tsh likely due to steroids.      Problem/Recommendation - 3:  ·  Problem: COVID-19 virus infection.  Recommendation: cont tx per icu team.     Hyperglycemia- due to steroids   change humalog to moderate doses

## 2020-12-11 NOTE — PROGRESS NOTE ADULT - ASSESSMENT
ASSESSMENT AND PLAN:  61 yo F from home lives with son (who is autistic, covid +) PMHx of hypothyroid, sleep apnea, HTN PUD, cervical OA PSH tracheal resection and reconstruction, uterous myoma, presents to the ED c/o shortness of breath x 2 days. Patient underwent sleep studyfrom 11/20 to 11/21 ,diagnosed with DARRELL, spiked fever the day after 101.6 associated with headache, and later on cough and diarrhea and tested positive for COVID on 111/23rd. pt has been having fever and diarrhea since Dx. Pt notes onset of  worsening shortness of breath  for past 2 days.  Patient denies vomiting, rash , joint pain or any other acute complaints. Pt started ABx likley amoxicillin (does not remember te name ) and Decadron 6mg X5 days.    Of note : pt reports h/o massive GIB resulted in emergency intubation for 5 days. Pt reports her trachea was damaged as a result of intubation. s/p tracheal resection and reconstruction April 2016. She has undergone multiple surveillance bronchoscopies,    ED course : patient saturated on high 80s on RA on arrival , was placed on NRB and descalated to NC 4 liters, pt saturated 96% on 4L NC,  on my evaluation patient was saturating 97% on 2 L NC however tachypneic while talking or getting exited.  CXR : patchy basilar infiltrates   (01 Dec 2020 13:01)  Pt was saturating 93% on 100% NRB, transferred to ICU for high flow oxygen therapy.     Assessment:   Acute hypoxic respiratory failure due to COVID PNA   Sleep Apnea,   PUD, hypothyroidism     === Neuro===  Alert and oriented x 3    #Anxiety  - added Xanax 0.5mg BID as spO2 drops when pt becomes anxious , Patient is extremely anxious, family wants us to limit information delivery to her  - Non compliant with proning recs    #Central sleep apnea?  Called her neurologist Dr. Greg Moreno [phone] for details, but no answer , Will defer it for outpatient     === Cardiovascular===  HTN: not on any medication at home   - continue to monitor for now.     ===- Pulm===  Acute hypoxic respiratory failure due to COVID PNA   -completed remdesevir  - s/p Rocephin and Azith 12/1-3, Dcd as CXR consistent with Covid PNA  - On high flow 97% 55L, Patient's work of breathing is stable- s/p plasma 12/4 as per pt's request. Explained the study doesn't show benefit  - Patient is currently on 50L 100%  saturating above 90%, tachypneic 12/11  - continue with decadron  - ID Dr Nelson  - Lovenox DVT proph 40 mg daily as D-Dimer is <150   - Follow inflammatory markers on alternate days     #sleep Apnea : pt recently had Sleep study done and diagnosed with Sleep apnea on Nov 20 but not started on CPAP yet   OP follow up     ====ID===  PNA: management as above     === Nephro===  no active issues     ===GI===  #Elevated LFTs  - LFTs trending down  - likely from covid  - Hepatitis C panel negative, Hepatitis B Ab positive  - monitor LFTs    #Hx of PUD:   - c/w Protonix     === Heme===  #Leukocytosis   - Likely due to covid infection  - monitor CBC    ===Endocrine===  #hyperglycemia  -likely from steroids  -sliding scale switched to moderate as per endo  -Endo Dr Lopez    #hypothyroidism   - TSH low 0.14  - T3  48, Free Thyroxine 1.3  - c/w Levothyroxine     #Hypercalcemia  - Elevated calcium 11, vit D-25 WNL,  vitamin D, 1-25 high, PTH: high   -Repeat 1-25 OH elevated  - f/u PTHrp    === Skin/Catheters===  No rashes. Peripheral IV lines.     ===Prophylaxis ===  LOVenox 40mg daily for DVT proph   Protonix for  GI prophylaxis    ===GOC===  DNR/DNI, discussed with pt and daughter over phone and MOLST form signed and placed in the chart.       ===Disposition===  Monitor in ICU

## 2020-12-12 NOTE — PROGRESS NOTE ADULT - ASSESSMENT
ASSESSMENT AND PLAN:  61 yo F from home lives with son (who is autistic, covid +) PMHx of hypothyroid, sleep apnea, HTN PUD, cervical OA PSH tracheal resection and reconstruction, uterous myoma, presents to the ED c/o shortness of breath x 2 days. Patient underwent sleep studyfrom 11/20 to 11/21 ,diagnosed with DARRELL, spiked fever the day after 101.6 associated with headache, and later on cough and diarrhea and tested positive for COVID on 111/23rd. pt has been having fever and diarrhea since Dx. Pt notes onset of  worsening shortness of breath  for past 2 days.  Patient denies vomiting, rash , joint pain or any other acute complaints. Pt started ABx likley amoxicillin (does not remember te name ) and Decadron 6mg X5 days.    Of note : pt reports h/o massive GIB resulted in emergency intubation for 5 days. Pt reports her trachea was damaged as a result of intubation. s/p tracheal resection and reconstruction April 2016. She has undergone multiple surveillance bronchoscopies,    ED course : patient saturated on high 80s on RA on arrival , was placed on NRB and descalated to NC 4 liters, pt saturated 96% on 4L NC,  on my evaluation patient was saturating 97% on 2 L NC however tachypneic while talking or getting exited.  CXR : patchy basilar infiltrates   (01 Dec 2020 13:01)  Pt was saturating 93% on 100% NRB, transferred to ICU for high flow oxygen therapy.     Assessment:   Acute hypoxic respiratory failure due to COVID PNA   Sleep Apnea,   PUD, hypothyroidism     === Neuro===  Alert and oriented x 3    #Anxiety  - added Xanax 0.5mg BID as spO2 drops when pt becomes anxious , Patient is extremely anxious, family wants us to limit information delivery to her  - Non compliant with proning recs    #Central sleep apnea?  Called her neurologist Dr. Greg Moreno [phone] for details, but no answer , Will defer it for outpatient     === Cardiovascular===  HTN: not on any medication at home   - continue to monitor for now.     ===- Pulm===  Acute hypoxic respiratory failure due to COVID PNA   -completed remdesevir  - s/p Rocephin and Azith 12/1-3, Dcd as CXR consistent with Covid PNA  - On high flow 97% 55L, Patient's work of breathing is stable- s/p plasma 12/4 as per pt's request. Explained the study doesn't show benefit  - Patient is currently on 50L 100%  saturating above 90%, tachypneic 12/11  - continue with decadron  - ID Dr Nelson  - Lovenox DVT proph 40 mg daily as D-Dimer is <150   - Follow inflammatory markers on alternate days     #sleep Apnea : pt recently had Sleep study done and diagnosed with Sleep apnea on Nov 20 but not started on CPAP yet   OP follow up     ====ID===  PNA: management as above     === Nephro===  no active issues     ===GI===  #Elevated LFTs  - LFTs trending down  - likely from covid  - Hepatitis C panel negative, Hepatitis B Ab positive  - monitor LFTs    #Hx of PUD:   - c/w Protonix     === Heme===  #Leukocytosis   - Likely due to covid infection  - monitor CBC    ===Endocrine===  #hyperglycemia  -likely from steroids  -sliding scale switched to moderate as per endo  -Endo Dr Lopez    #hypothyroidism   - TSH low 0.14  - T3  48, Free Thyroxine 1.3  - c/w Levothyroxine     #Hypercalcemia  - Elevated calcium 11, vit D-25 WNL,  vitamin D, 1-25 high, PTH: high   -Repeat 1-25 OH elevated  - f/u PTHrp    === Skin/Catheters===  No rashes. Peripheral IV lines.     ===Prophylaxis ===  LOVenox 40mg daily for DVT proph   Protonix for  GI prophylaxis    ===GOC===  DNR/DNI, discussed with pt and daughter over phone and MOLST form signed and placed in the chart.       ===Disposition===  Monitor in ICU

## 2020-12-12 NOTE — PROGRESS NOTE ADULT - SUBJECTIVE AND OBJECTIVE BOX
INTERVAL HPI/OVERNIGHT EVENTS: ***    REVIEW OF SYSTEMS:    CONSTITUTIONAL: No weakness, fevers or chills  NECK: No pain or stiffness  RESPIRATORY: No cough, wheezing, hemoptysis; No shortness of breath  CARDIOVASCULAR: No chest pain or palpitations  GASTROINTESTINAL: No abdominal or epigastric pain. No nausea, vomiting, No diarrhea or constipation. No melena or hematochezia.  GENITOURINARY: No dysuria, frequency or hematuria  NEUROLOGICAL: No numbness or weakness  All other review of systems is negative unless indicated above    PRESSORS: [] YES [] NO  WHICH: N/A    Antimicrobial:    Cardiovascular:    Pulmonary:  ALBUTerol    90 MICROgram(s) HFA Inhaler 2 Puff(s) Inhalation every 6 hours    Hematalogic:  enoxaparin Injectable 40 milliGRAM(s) SubCutaneous every 12 hours    Other:  acetaminophen   Tablet .. 650 milliGRAM(s) Oral every 6 hours PRN  ALPRAZolam 1 milliGRAM(s) Oral every 12 hours  chlorhexidine 2% Cloths 1 Application(s) Topical <User Schedule>  dexAMETHasone     Tablet 2 milliGRAM(s) Oral once  dextrose 5%. 1000 milliLiter(s) IV Continuous <Continuous>  dextrose 50% Injectable 25 Gram(s) IV Push once  dextrose 50% Injectable 12.5 Gram(s) IV Push once  glucagon  Injectable 1 milliGRAM(s) IntraMuscular once  insulin lispro (ADMELOG) corrective regimen sliding scale   SubCutaneous three times a day before meals  levothyroxine 100 MICROGram(s) Oral daily  melatonin 5 milliGRAM(s) Oral at bedtime  morphine  - Injectable 1 milliGRAM(s) IV Push every 4 hours PRN  pantoprazole    Tablet 40 milliGRAM(s) Oral before breakfast  polyethylene glycol 3350 17 Gram(s) Oral daily  senna 2 Tablet(s) Oral at bedtime    acetaminophen   Tablet .. 650 milliGRAM(s) Oral every 6 hours PRN  ALBUTerol    90 MICROgram(s) HFA Inhaler 2 Puff(s) Inhalation every 6 hours  ALPRAZolam 1 milliGRAM(s) Oral every 12 hours  chlorhexidine 2% Cloths 1 Application(s) Topical <User Schedule>  dexAMETHasone     Tablet 2 milliGRAM(s) Oral once  dextrose 5%. 1000 milliLiter(s) IV Continuous <Continuous>  dextrose 50% Injectable 25 Gram(s) IV Push once  dextrose 50% Injectable 12.5 Gram(s) IV Push once  enoxaparin Injectable 40 milliGRAM(s) SubCutaneous every 12 hours  glucagon  Injectable 1 milliGRAM(s) IntraMuscular once  insulin lispro (ADMELOG) corrective regimen sliding scale   SubCutaneous three times a day before meals  levothyroxine 100 MICROGram(s) Oral daily  melatonin 5 milliGRAM(s) Oral at bedtime  morphine  - Injectable 1 milliGRAM(s) IV Push every 4 hours PRN  pantoprazole    Tablet 40 milliGRAM(s) Oral before breakfast  polyethylene glycol 3350 17 Gram(s) Oral daily  senna 2 Tablet(s) Oral at bedtime    Drug Dosing Weight  Height (cm): 162.6 (01 Dec 2020 09:53)  Weight (kg): 85.7 (01 Dec 2020 09:53)  BMI (kg/m2): 32.4 (01 Dec 2020 09:53)  BSA (m2): 1.91 (01 Dec 2020 09:53)    ICU Vital Signs Last 24 Hrs  T(C): 36.8 (11 Dec 2020 23:00), Max: 36.8 (11 Dec 2020 23:00)  T(F): 98.2 (11 Dec 2020 23:00), Max: 98.2 (11 Dec 2020 23:00)  HR: 79 (11 Dec 2020 23:00) (65 - 94)  BP: 124/77 (11 Dec 2020 23:00) (84/70 - 159/78)  BP(mean): 88 (11 Dec 2020 23:00) (73 - 101)  ABP: --  ABP(mean): --  RR: 38 (11 Dec 2020 23:00) (20 - 45)  SpO2: 92% (11 Dec 2020 23:00) (82% - 95%)      ABG - ( 10 Dec 2020 04:18 )  pH, Arterial: 7.43  pH, Blood: x     /  pCO2: 44    /  pO2: 55    / HCO3: 29    / Base Excess: 4.8   /  SaO2: 87                    12-10 @ 07:01  -  12-11 @ 07:00  --------------------------------------------------------  IN: 240 mL / OUT: 600 mL / NET: -360 mL              PHYSICAL EXAM:    GENERAL: NAD, well-groomed, well-developed, AAOx3  EYES: EOMI, PERRLA,   NECK: Supple, No JVD; Normal thyroid; Trachea midline  NERVOUS SYSTEM: Motor Strength 5/5 B/L upper and lower extremities; DTRs 2+ intact and symmetric  CHEST/LUNG: No rales, rhonchi, wheezing   HEART: Regular rate and rhythm; No murmurs,   ABDOMEN: Soft, Nontender, Nondistended; Bowel sounds present  EXTREMITIES:  2+ Peripheral Pulses, No clubbing, cyanosis, or edema  LINES/TUBES/CATHETERS: n/a    LABS:  CBC Full  -  ( 11 Dec 2020 06:32 )  WBC Count : 18.29 K/uL  RBC Count : 4.14 M/uL  Hemoglobin : 12.9 g/dL  Hematocrit : 39.6 %  Platelet Count - Automated : 393 K/uL  Mean Cell Volume : 95.7 fl  Mean Cell Hemoglobin : 31.2 pg  Mean Cell Hemoglobin Concentration : 32.6 gm/dL  Auto Neutrophil # : x  Auto Lymphocyte # : x  Auto Monocyte # : x  Auto Eosinophil # : x  Auto Basophil # : x  Auto Neutrophil % : x  Auto Lymphocyte % : x  Auto Monocyte % : x  Auto Eosinophil % : x  Auto Basophil % : x    12-11    135  |  99  |  17  ----------------------------<  192<H>  4.4   |  30  |  0.54    Ca    10.3      11 Dec 2020 06:32  Phos  2.3     12-11  Mg     2.4     12-11    TPro  6.6  /  Alb  2.0<L>  /  TBili  0.3  /  DBili  x   /  AST  15  /  ALT  73<H>  /  AlkPhos  138<H>  12-11            RADIOLOGY & ADDITIONAL STUDIES REVIEWED:  Yes    CRITICAL CARE TIME SPENT: 35 minutes INTERVAL HPI/OVERNIGHT EVENTS: Patient complains of back pain. Tylenol given overnight. Patient is saturating well on HFNC.    PRESSORS: [] YES [x] NO  WHICH: N/A    Antimicrobial:    Cardiovascular:    Pulmonary:  ALBUTerol    90 MICROgram(s) HFA Inhaler 2 Puff(s) Inhalation every 6 hours    Hematalogic:  enoxaparin Injectable 40 milliGRAM(s) SubCutaneous every 12 hours    Other:  acetaminophen   Tablet .. 650 milliGRAM(s) Oral every 6 hours PRN  ALPRAZolam 1 milliGRAM(s) Oral every 12 hours  chlorhexidine 2% Cloths 1 Application(s) Topical <User Schedule>  dexAMETHasone     Tablet 2 milliGRAM(s) Oral once  dextrose 5%. 1000 milliLiter(s) IV Continuous <Continuous>  dextrose 50% Injectable 25 Gram(s) IV Push once  dextrose 50% Injectable 12.5 Gram(s) IV Push once  glucagon  Injectable 1 milliGRAM(s) IntraMuscular once  insulin lispro (ADMELOG) corrective regimen sliding scale   SubCutaneous three times a day before meals  levothyroxine 100 MICROGram(s) Oral daily  melatonin 5 milliGRAM(s) Oral at bedtime  morphine  - Injectable 1 milliGRAM(s) IV Push every 4 hours PRN  pantoprazole    Tablet 40 milliGRAM(s) Oral before breakfast  polyethylene glycol 3350 17 Gram(s) Oral daily  senna 2 Tablet(s) Oral at bedtime    acetaminophen   Tablet .. 650 milliGRAM(s) Oral every 6 hours PRN  ALBUTerol    90 MICROgram(s) HFA Inhaler 2 Puff(s) Inhalation every 6 hours  ALPRAZolam 1 milliGRAM(s) Oral every 12 hours  chlorhexidine 2% Cloths 1 Application(s) Topical <User Schedule>  dexAMETHasone     Tablet 2 milliGRAM(s) Oral once  dextrose 5%. 1000 milliLiter(s) IV Continuous <Continuous>  dextrose 50% Injectable 25 Gram(s) IV Push once  dextrose 50% Injectable 12.5 Gram(s) IV Push once  enoxaparin Injectable 40 milliGRAM(s) SubCutaneous every 12 hours  glucagon  Injectable 1 milliGRAM(s) IntraMuscular once  insulin lispro (ADMELOG) corrective regimen sliding scale   SubCutaneous three times a day before meals  levothyroxine 100 MICROGram(s) Oral daily  melatonin 5 milliGRAM(s) Oral at bedtime  morphine  - Injectable 1 milliGRAM(s) IV Push every 4 hours PRN  pantoprazole    Tablet 40 milliGRAM(s) Oral before breakfast  polyethylene glycol 3350 17 Gram(s) Oral daily  senna 2 Tablet(s) Oral at bedtime    Drug Dosing Weight  Height (cm): 162.6 (01 Dec 2020 09:53)  Weight (kg): 85.7 (01 Dec 2020 09:53)  BMI (kg/m2): 32.4 (01 Dec 2020 09:53)  BSA (m2): 1.91 (01 Dec 2020 09:53)    ICU Vital Signs Last 24 Hrs  T(C): 36.8 (11 Dec 2020 23:00), Max: 36.8 (11 Dec 2020 23:00)  T(F): 98.2 (11 Dec 2020 23:00), Max: 98.2 (11 Dec 2020 23:00)  HR: 79 (11 Dec 2020 23:00) (65 - 94)  BP: 124/77 (11 Dec 2020 23:00) (84/70 - 159/78)  BP(mean): 88 (11 Dec 2020 23:00) (73 - 101)  ABP: --  ABP(mean): --  RR: 38 (11 Dec 2020 23:00) (20 - 45)  SpO2: 92% (11 Dec 2020 23:00) (82% - 95%)      ABG - ( 10 Dec 2020 04:18 )  pH, Arterial: 7.43  pH, Blood: x     /  pCO2: 44    /  pO2: 55    / HCO3: 29    / Base Excess: 4.8   /  SaO2: 87                    12-10 @ 07:01  -  12-11 @ 07:00  --------------------------------------------------------  IN: 240 mL / OUT: 600 mL / NET: -360 mL              PHYSICAL EXAM:  GENERAL: NAD, well-groomed, anxious  HEAD:  Atraumatic, Normocephalic  EYES: EOMI, PERRLA, conjunctiva and sclera clear  ENMT: No tonsillar erythema, exudates, or enlargement; Moist mucous membranes, Good dentition, [ ]No lesions  NECK: Supple, normal appearance, No JVD; Normal thyroid; Trachea midline  NERVOUS SYSTEM:  Alert & Oriented X3  CHEST/LUNG: No chest deformity; Normal percussion bilaterally; No rales, rhonchi, wheezing   HEART: Regular rate and rhythm; No murmurs, rubs, or gallops  ABDOMEN: Soft, Nontender, Nondistended;Bowel sounds present  EXTREMITIES:  2+ Peripheral Pulses, No clubbing, cyanosis, or edema  LYMPH: No lymphadenopathy noted  SKIN: No rashes or lesions; Good capillary refill    LABS:  CBC Full  -  ( 11 Dec 2020 06:32 )  WBC Count : 18.29 K/uL  RBC Count : 4.14 M/uL  Hemoglobin : 12.9 g/dL  Hematocrit : 39.6 %  Platelet Count - Automated : 393 K/uL  Mean Cell Volume : 95.7 fl  Mean Cell Hemoglobin : 31.2 pg  Mean Cell Hemoglobin Concentration : 32.6 gm/dL  Auto Neutrophil # : x  Auto Lymphocyte # : x  Auto Monocyte # : x  Auto Eosinophil # : x  Auto Basophil # : x  Auto Neutrophil % : x  Auto Lymphocyte % : x  Auto Monocyte % : x  Auto Eosinophil % : x  Auto Basophil % : x    12-11    135  |  99  |  17  ----------------------------<  192<H>  4.4   |  30  |  0.54    Ca    10.3      11 Dec 2020 06:32  Phos  2.3     12-11  Mg     2.4     12-11    TPro  6.6  /  Alb  2.0<L>  /  TBili  0.3  /  DBili  x   /  AST  15  /  ALT  73<H>  /  AlkPhos  138<H>  12-11            RADIOLOGY & ADDITIONAL STUDIES REVIEWED:  Yes    CRITICAL CARE TIME SPENT: 35 minutes INTERVAL HPI/OVERNIGHT EVENTS: Patient complains of sharp chest pain. Pt given Morphine. Patient is saturating well on HFNC.    PRESSORS: [] YES [x] NO  WHICH: N/A    Antimicrobial:    Cardiovascular:    Pulmonary:  ALBUTerol    90 MICROgram(s) HFA Inhaler 2 Puff(s) Inhalation every 6 hours    Hematalogic:  enoxaparin Injectable 40 milliGRAM(s) SubCutaneous every 12 hours    Other:  acetaminophen   Tablet .. 650 milliGRAM(s) Oral every 6 hours PRN  ALPRAZolam 1 milliGRAM(s) Oral every 12 hours  chlorhexidine 2% Cloths 1 Application(s) Topical <User Schedule>  dexAMETHasone     Tablet 2 milliGRAM(s) Oral once  dextrose 5%. 1000 milliLiter(s) IV Continuous <Continuous>  dextrose 50% Injectable 25 Gram(s) IV Push once  dextrose 50% Injectable 12.5 Gram(s) IV Push once  glucagon  Injectable 1 milliGRAM(s) IntraMuscular once  insulin lispro (ADMELOG) corrective regimen sliding scale   SubCutaneous three times a day before meals  levothyroxine 100 MICROGram(s) Oral daily  melatonin 5 milliGRAM(s) Oral at bedtime  morphine  - Injectable 1 milliGRAM(s) IV Push every 4 hours PRN  pantoprazole    Tablet 40 milliGRAM(s) Oral before breakfast  polyethylene glycol 3350 17 Gram(s) Oral daily  senna 2 Tablet(s) Oral at bedtime    acetaminophen   Tablet .. 650 milliGRAM(s) Oral every 6 hours PRN  ALBUTerol    90 MICROgram(s) HFA Inhaler 2 Puff(s) Inhalation every 6 hours  ALPRAZolam 1 milliGRAM(s) Oral every 12 hours  chlorhexidine 2% Cloths 1 Application(s) Topical <User Schedule>  dexAMETHasone     Tablet 2 milliGRAM(s) Oral once  dextrose 5%. 1000 milliLiter(s) IV Continuous <Continuous>  dextrose 50% Injectable 25 Gram(s) IV Push once  dextrose 50% Injectable 12.5 Gram(s) IV Push once  enoxaparin Injectable 40 milliGRAM(s) SubCutaneous every 12 hours  glucagon  Injectable 1 milliGRAM(s) IntraMuscular once  insulin lispro (ADMELOG) corrective regimen sliding scale   SubCutaneous three times a day before meals  levothyroxine 100 MICROGram(s) Oral daily  melatonin 5 milliGRAM(s) Oral at bedtime  morphine  - Injectable 1 milliGRAM(s) IV Push every 4 hours PRN  pantoprazole    Tablet 40 milliGRAM(s) Oral before breakfast  polyethylene glycol 3350 17 Gram(s) Oral daily  senna 2 Tablet(s) Oral at bedtime    Drug Dosing Weight  Height (cm): 162.6 (01 Dec 2020 09:53)  Weight (kg): 85.7 (01 Dec 2020 09:53)  BMI (kg/m2): 32.4 (01 Dec 2020 09:53)  BSA (m2): 1.91 (01 Dec 2020 09:53)    ICU Vital Signs Last 24 Hrs  T(C): 36.8 (11 Dec 2020 23:00), Max: 36.8 (11 Dec 2020 23:00)  T(F): 98.2 (11 Dec 2020 23:00), Max: 98.2 (11 Dec 2020 23:00)  HR: 79 (11 Dec 2020 23:00) (65 - 94)  BP: 124/77 (11 Dec 2020 23:00) (84/70 - 159/78)  BP(mean): 88 (11 Dec 2020 23:00) (73 - 101)  ABP: --  ABP(mean): --  RR: 38 (11 Dec 2020 23:00) (20 - 45)  SpO2: 92% (11 Dec 2020 23:00) (82% - 95%)      ABG - ( 10 Dec 2020 04:18 )  pH, Arterial: 7.43  pH, Blood: x     /  pCO2: 44    /  pO2: 55    / HCO3: 29    / Base Excess: 4.8   /  SaO2: 87                    12-10 @ 07:01  -  12-11 @ 07:00  --------------------------------------------------------  IN: 240 mL / OUT: 600 mL / NET: -360 mL              PHYSICAL EXAM:  GENERAL: NAD, well-groomed, anxious  HEAD:  Atraumatic, Normocephalic  EYES: EOMI, PERRLA, conjunctiva and sclera clear  ENMT: No tonsillar erythema, exudates, or enlargement; Moist mucous membranes, Good dentition, [ ]No lesions  NECK: Supple, normal appearance, No JVD; Normal thyroid; Trachea midline  NERVOUS SYSTEM:  Alert & Oriented X3  CHEST/LUNG: No chest deformity; Normal percussion bilaterally; No rales, rhonchi, wheezing   HEART: Regular rate and rhythm; No murmurs, rubs, or gallops  ABDOMEN: Soft, Nontender, Nondistended;Bowel sounds present  EXTREMITIES:  2+ Peripheral Pulses, No clubbing, cyanosis, or edema  LYMPH: No lymphadenopathy noted  SKIN: No rashes or lesions; Good capillary refill    LABS:  CBC Full  -  ( 11 Dec 2020 06:32 )  WBC Count : 18.29 K/uL  RBC Count : 4.14 M/uL  Hemoglobin : 12.9 g/dL  Hematocrit : 39.6 %  Platelet Count - Automated : 393 K/uL  Mean Cell Volume : 95.7 fl  Mean Cell Hemoglobin : 31.2 pg  Mean Cell Hemoglobin Concentration : 32.6 gm/dL  Auto Neutrophil # : x  Auto Lymphocyte # : x  Auto Monocyte # : x  Auto Eosinophil # : x  Auto Basophil # : x  Auto Neutrophil % : x  Auto Lymphocyte % : x  Auto Monocyte % : x  Auto Eosinophil % : x  Auto Basophil % : x    12-11    135  |  99  |  17  ----------------------------<  192<H>  4.4   |  30  |  0.54    Ca    10.3      11 Dec 2020 06:32  Phos  2.3     12-11  Mg     2.4     12-11    TPro  6.6  /  Alb  2.0<L>  /  TBili  0.3  /  DBili  x   /  AST  15  /  ALT  73<H>  /  AlkPhos  138<H>  12-11            RADIOLOGY & ADDITIONAL STUDIES REVIEWED:  Yes    CRITICAL CARE TIME SPENT: 35 minutes

## 2020-12-12 NOTE — PROGRESS NOTE ADULT - SUBJECTIVE AND OBJECTIVE BOX
Patient is a 60y old  Female who presents with a chief complaint of COVID PNA requiring oxygen supplementation (12 Dec 2020 00:18)    pt seen in icu [ x ], reg med floor [   ], bed [ x ], chair at bedside [   ], a+o x3 [ x ], lethargic [  ],    nad [x  ]      Allergies    No Known Allergies        Vitals    T(F): 97.5 (12-12-20 @ 05:00), Max: 98.2 (12-11-20 @ 23:00)  HR: 77 (12-12-20 @ 06:00) (70 - 94)  BP: 122/74 (12-12-20 @ 06:00) (84/70 - 159/78)  RR: 24 (12-12-20 @ 06:00) (19 - 45)  SpO2: 87% (12-12-20 @ 06:00) (82% - 95%)  Wt(kg): --  CAPILLARY BLOOD GLUCOSE      POCT Blood Glucose.: 221 mg/dL (12 Dec 2020 05:46)      Labs                          12.9   18.29 )-----------( 393      ( 11 Dec 2020 06:32 )             39.6       12-11    135  |  99  |  17  ----------------------------<  192<H>  4.4   |  30  |  0.54    Ca    10.3      11 Dec 2020 06:32  Phos  2.3     12-11  Mg     2.4     12-11    TPro  6.6  /  Alb  2.0<L>  /  TBili  0.3  /  DBili  x   /  AST  15  /  ALT  73<H>  /  AlkPhos  138<H>  12-11                Radiology Results      Meds    MEDICATIONS  (STANDING):  ALBUTerol    90 MICROgram(s) HFA Inhaler 2 Puff(s) Inhalation every 6 hours  ALPRAZolam 1 milliGRAM(s) Oral every 12 hours  chlorhexidine 2% Cloths 1 Application(s) Topical <User Schedule>  dextrose 5%. 1000 milliLiter(s) (100 mL/Hr) IV Continuous <Continuous>  dextrose 50% Injectable 12.5 Gram(s) IV Push once  dextrose 50% Injectable 25 Gram(s) IV Push once  enoxaparin Injectable 40 milliGRAM(s) SubCutaneous every 12 hours  glucagon  Injectable 1 milliGRAM(s) IntraMuscular once  insulin lispro (ADMELOG) corrective regimen sliding scale   SubCutaneous three times a day before meals  levothyroxine 100 MICROGram(s) Oral daily  melatonin 5 milliGRAM(s) Oral at bedtime  pantoprazole    Tablet 40 milliGRAM(s) Oral before breakfast  polyethylene glycol 3350 17 Gram(s) Oral daily  senna 2 Tablet(s) Oral at bedtime      MEDICATIONS  (PRN):  acetaminophen   Tablet .. 650 milliGRAM(s) Oral every 6 hours PRN Temp greater or equal to 38C (100.4F), Mild Pain (1 - 3)  morphine  - Injectable 1 milliGRAM(s) IV Push every 4 hours PRN prone      Physical Exam    Neuro :  no focal deficits  Respiratory: CTA B/L  CV: RRR, S1S2, no murmurs,   Abdominal: Soft, NT, ND +BS,  Extremities: No edema, + peripheral pulses          ASSESSMENT    Infection due to severe acute respiratory syndrome coronavirus 2 (SARS-CoV-2)  pneumonia,   hypoxia,   h/o hypothyroid,   sleep apnea,   PUD,   cervical OA   tracheal resection and reconstruction,   uterus myoma,   HTN,   gi bleed  Osteoporosis  Hyperparathyroidism      PLAN    id f/u   Decadron 6mgs iv q24hrs now on taper  completed Remdesivir 12/06/20  s/p convalescent plasma  cont contact and airborne isolation,   cont albuterol inhaler,   cont tylenol prn, robitussin prn   pulm f/u   O2 sat (86% - 95%) on hiflo   bipap as needed,   pt afebrile  dvt prophylaxis  covid-19 antibody test neg,   resp viral panel positive for covid 19,   procalcitonin, D-dimer wnl noted     esr, crp, ldh, ferritin elevated noted    endo f/u  hypercalcemia due to hyperparathyroidism  high vitamin d 1-25 ? etiology-   ace19  hx of parathyroid surgery r/o pth hyperplasia  s/p one dose of iv aredia 90mg   calcium improving  cont to monitor calcium  keep pt hydrated   cont current meds  mgmt as per icu         Patient is a 60y old  Female who presents with a chief complaint of COVID PNA requiring oxygen supplementation (12 Dec 2020 00:18)    pt seen in icu [ x ], reg med floor [   ], bed [ x ], chair at bedside [   ], a+o x3 [ x ], lethargic [  ],    nad [x  ]      Allergies    No Known Allergies        Vitals    T(F): 97.5 (12-12-20 @ 05:00), Max: 98.2 (12-11-20 @ 23:00)  HR: 77 (12-12-20 @ 06:00) (70 - 94)  BP: 122/74 (12-12-20 @ 06:00) (84/70 - 159/78)  RR: 24 (12-12-20 @ 06:00) (19 - 45)  SpO2: 87% (12-12-20 @ 06:00) (82% - 95%)  Wt(kg): --  CAPILLARY BLOOD GLUCOSE      POCT Blood Glucose.: 221 mg/dL (12 Dec 2020 05:46)      Labs                          12.9   18.29 )-----------( 393      ( 11 Dec 2020 06:32 )             39.6       12-11    135  |  99  |  17  ----------------------------<  192<H>  4.4   |  30  |  0.54    Ca    10.3      11 Dec 2020 06:32  Phos  2.3     12-11  Mg     2.4     12-11    TPro  6.6  /  Alb  2.0<L>  /  TBili  0.3  /  DBili  x   /  AST  15  /  ALT  73<H>  /  AlkPhos  138<H>  12-11                Radiology Results      Meds    MEDICATIONS  (STANDING):  ALBUTerol    90 MICROgram(s) HFA Inhaler 2 Puff(s) Inhalation every 6 hours  ALPRAZolam 1 milliGRAM(s) Oral every 12 hours  chlorhexidine 2% Cloths 1 Application(s) Topical <User Schedule>  dextrose 5%. 1000 milliLiter(s) (100 mL/Hr) IV Continuous <Continuous>  dextrose 50% Injectable 12.5 Gram(s) IV Push once  dextrose 50% Injectable 25 Gram(s) IV Push once  enoxaparin Injectable 40 milliGRAM(s) SubCutaneous every 12 hours  glucagon  Injectable 1 milliGRAM(s) IntraMuscular once  insulin lispro (ADMELOG) corrective regimen sliding scale   SubCutaneous three times a day before meals  levothyroxine 100 MICROGram(s) Oral daily  melatonin 5 milliGRAM(s) Oral at bedtime  pantoprazole    Tablet 40 milliGRAM(s) Oral before breakfast  polyethylene glycol 3350 17 Gram(s) Oral daily  senna 2 Tablet(s) Oral at bedtime      MEDICATIONS  (PRN):  acetaminophen   Tablet .. 650 milliGRAM(s) Oral every 6 hours PRN Temp greater or equal to 38C (100.4F), Mild Pain (1 - 3)  morphine  - Injectable 1 milliGRAM(s) IV Push every 4 hours PRN prone      Physical Exam    Neuro :  no focal deficits  Respiratory: CTA B/L  CV: RRR, S1S2, no murmurs,   Abdominal: Soft, NT, ND +BS,  Extremities: No edema, + peripheral pulses          ASSESSMENT    Infection due to severe acute respiratory syndrome coronavirus 2 (SARS-CoV-2)  pneumonia,   hypoxia,   h/o hypothyroid,   sleep apnea,   PUD,   cervical OA   tracheal resection and reconstruction,   uterus myoma,   HTN,   gi bleed  Osteoporosis  Hyperparathyroidism      PLAN    id f/u   Decadron d/c'd 12/10/20  completed Remdesivir 12/06/20  s/p convalescent plasma  cont contact and airborne isolation,   cont albuterol inhaler,   cont tylenol prn, robitussin prn   pulm f/u   O2 sat (82% - 95%) on hiflo   bipap as needed,   pt afebrile  dvt prophylaxis  covid-19 antibody test neg,   resp viral panel positive for covid 19,   procalcitonin, D-dimer wnl noted     esr, crp, ldh, ferritin elevated noted    endo f/u  hypercalcemia due to hyperparathyroidism  high vitamin d 1-25 ? etiology-   ace lev wnl   hx of parathyroid surgery r/o pth hyperplasia  s/p one dose of iv aredia 90mg   calcium improving  cont to monitor calcium  keep pt hydrated   cont current meds  mgmt as per icu

## 2020-12-12 NOTE — PROGRESS NOTE ADULT - SUBJECTIVE AND OBJECTIVE BOX
Patient is a 60y old  Female who presents with a chief complaint of COVID PNA requiring oxygen supplementation (12 Dec 2020 06:40)  Awake, alert, laying in bed with high flow oxygen supp. Oxygen sat 86% to 90%    INTERVAL HPI/OVERNIGHT EVENTS:      VITAL SIGNS:  T(F): 97 (12-12-20 @ 08:00)  HR: 87 (12-12-20 @ 11:58)  BP: 128/70 (12-12-20 @ 08:00)  RR: 26 (12-12-20 @ 08:14)  SpO2: 90% (12-12-20 @ 11:58)  Wt(kg): --  I&O's Detail    11 Dec 2020 07:01  -  12 Dec 2020 07:00  --------------------------------------------------------  IN:    Oral Fluid: 180 mL  Total IN: 180 mL    OUT:    Voided (mL): 550 mL  Total OUT: 550 mL    Total NET: -370 mL              REVIEW OF SYSTEMS:    CONSTITUTIONAL:  No fevers, chills, sweats    HEENT:  Eyes:  No diplopia or blurred vision. ENT:  No earache, sore throat or runny nose.    CARDIOVASCULAR:  No pressure, squeezing, tightness, or heaviness about the chest; no palpitations.    RESPIRATORY:  Per HPI    GASTROINTESTINAL:  No abdominal pain, nausea, vomiting or diarrhea.    GENITOURINARY:  No dysuria, frequency or urgency.    NEUROLOGIC:  No paresthesias, fasciculations, seizures or weakness.    PSYCHIATRIC:  No disorder of thought or mood.      PHYSICAL EXAM:    Constitutional: Well developed and nourished  Eyes:Perrla  ENMT: normal  Neck:supple  Respiratory: good air entry  Cardiovascular: S1 S2 regular  Gastrointestinal: Soft, Non tender  Extremities: No edema  Vascular:normal  Neurological:Awake, alert,Ox3  Musculoskeletal:Normal      MEDICATIONS  (STANDING):  ALBUTerol    90 MICROgram(s) HFA Inhaler 2 Puff(s) Inhalation every 6 hours  ALPRAZolam 1 milliGRAM(s) Oral every 12 hours  chlorhexidine 2% Cloths 1 Application(s) Topical <User Schedule>  dextrose 5%. 1000 milliLiter(s) (100 mL/Hr) IV Continuous <Continuous>  dextrose 50% Injectable 25 Gram(s) IV Push once  dextrose 50% Injectable 12.5 Gram(s) IV Push once  enoxaparin Injectable 40 milliGRAM(s) SubCutaneous every 12 hours  glucagon  Injectable 1 milliGRAM(s) IntraMuscular once  insulin lispro (ADMELOG) corrective regimen sliding scale   SubCutaneous three times a day before meals  levothyroxine 100 MICROGram(s) Oral daily  melatonin 5 milliGRAM(s) Oral at bedtime  pantoprazole    Tablet 40 milliGRAM(s) Oral before breakfast  polyethylene glycol 3350 17 Gram(s) Oral daily  senna 2 Tablet(s) Oral at bedtime    MEDICATIONS  (PRN):  acetaminophen   Tablet .. 650 milliGRAM(s) Oral every 6 hours PRN Temp greater or equal to 38C (100.4F), Mild Pain (1 - 3)  morphine  - Injectable 1 milliGRAM(s) IV Push every 4 hours PRN prone      Allergies    No Known Allergies    Intolerances        LABS:                        13.1   16.11 )-----------( 351      ( 12 Dec 2020 06:33 )             40.3     12-12    132<L>  |  101  |  15  ----------------------------<  193<H>  5.0   |  26  |  0.51    Ca    10.1      12 Dec 2020 06:33  Phos  2.5     12-12  Mg     2.3     12-12    TPro  6.5  /  Alb  1.9<L>  /  TBili  0.4  /  DBili  x   /  AST  22  /  ALT  58  /  AlkPhos  129<H>  12-12              CAPILLARY BLOOD GLUCOSE      POCT Blood Glucose.: 169 mg/dL (12 Dec 2020 11:21)  POCT Blood Glucose.: 221 mg/dL (12 Dec 2020 05:46)  POCT Blood Glucose.: 113 mg/dL (11 Dec 2020 23:31)  POCT Blood Glucose.: 167 mg/dL (11 Dec 2020 16:57)    pro-bnp -- 12-05 @ 15:42     d-dimer <150  12-05 @ 15:42      RADIOLOGY & ADDITIONAL TESTS:    CXR:  < from: Xray Chest 1 View- PORTABLE-Routine (Xray Chest 1 View- PORTABLE-Routine in AM.) (12.09.20 @ 08:14) >  IMPRESSION:  Progression of infiltrates.    < end of copied text >    Ct scan chest:    ekg;    echo:

## 2020-12-12 NOTE — PROGRESS NOTE ADULT - SUBJECTIVE AND OBJECTIVE BOX
Interval Events:  pt in nad    Allergies    No Known Allergies    Intolerances      Endocrine/Metabolic Medications:  dextrose 50% Injectable 25 Gram(s) IV Push once  dextrose 50% Injectable 12.5 Gram(s) IV Push once  glucagon  Injectable 1 milliGRAM(s) IntraMuscular once  insulin lispro (ADMELOG) corrective regimen sliding scale   SubCutaneous three times a day before meals  levothyroxine 100 MICROGram(s) Oral daily      Vital Signs Last 24 Hrs  T(C): 36.1 (12 Dec 2020 08:00), Max: 36.8 (11 Dec 2020 23:00)  T(F): 97 (12 Dec 2020 08:00), Max: 98.2 (11 Dec 2020 23:00)  HR: 87 (12 Dec 2020 11:58) (70 - 94)  BP: 128/70 (12 Dec 2020 08:00) (84/70 - 159/78)  BP(mean): 84 (12 Dec 2020 08:00) (73 - 101)  RR: 26 (12 Dec 2020 08:14) (19 - 45)  SpO2: 90% (12 Dec 2020 11:58) (82% - 95%)      PHYSICAL EXAM  All physical exam findings normal, except those marked:  General:	Alert, active, cooperative, NAD, well hydrated  .		[] Abnormal:  Neck		Normal: supple, no cervical adenopathy, no palpable thyroid  .		[] Abnormal:  Cardiovascular	Normal: regular rate, normal S1, S2, no murmurs  .		[] Abnormal:  Respiratory	Normal: no chest wall deformity, normal respiratory pattern, CTA B/L  .		[] Abnormal:  Abdominal	Normal: soft, ND, NT, bowel sounds present, no masses, no organomegaly  .		[] Abnormal:  		Normal normal genitalia, testes descended, circumcised/uncircumcised  .		Lenore stage:			Breast lenore:  .		Menstrual history:  .		[] Abnormal:  Extremities	Normal: FROM x4  .		[] Abnormal:  Skin		Normal: intact and not indurated, no rash, no acanthosis nigricans  .		[] Abnormal:  Neurologic	Normal: grossly intact  .		[] Abnormal:    LABS                        13.1   16.11 )-----------( 351      ( 12 Dec 2020 06:33 )             40.3                               132    |  101    |  15                  Calcium: 10.1  / iCa: x      (12-12 @ 06:33)    ----------------------------<  193       Magnesium: 2.3                              5.0     |  26     |  0.51             Phosphorous: 2.5      TPro  6.5    /  Alb  1.9    /  TBili  0.4    /  DBili  x      /  AST  22     /  ALT  58     /  AlkPhos  129    12 Dec 2020 06:33    CAPILLARY BLOOD GLUCOSE      POCT Blood Glucose.: 169 mg/dL (12 Dec 2020 11:21)  POCT Blood Glucose.: 221 mg/dL (12 Dec 2020 05:46)  POCT Blood Glucose.: 113 mg/dL (11 Dec 2020 23:31)  POCT Blood Glucose.: 167 mg/dL (11 Dec 2020 16:57)        Assesment/plan      59 yo F from home lives with son (who is autistic, covid +) PMHx of hypothyroid, sleep apnea, PUD, cervical OA PSH tracheal resection and reconstruction, uterous myoma, HTN,  presents to the ED c/o shortness of breath x 2 days. Admitted with COVID PNA. Found to have hypercalcemia. Pt has hx of hyperparathyroidism s/p ? parathyroidectomy.       Problem/Recommendation - 1:  Problem: Hypercalcemia. due to hyperparathyroidism  high vitamin d 1-25 ? etiology- nl ACE levels  hx of parathyroid surgery r/o pth hyperplasia  worsening hypercalcemia - s/p one dose of iv aredia 90mg   cont to monitor calcium  d/w hs       Problem/Recommendation - 2:  ·  Problem: Hypothyroidism.  Recommendation: cont lt4 100 mcg  low tsh likely due to steroids.      Problem/Recommendation - 3:  ·  Problem: COVID-19 virus infection.  Recommendation: cont tx per icu team.     Hyperglycemia- due to steroids   cont  humalog correction doses

## 2020-12-13 NOTE — PROGRESS NOTE ADULT - ASSESSMENT
1. PNA 2nd to Covid-19  - PCR positive. IGG Negative.   - CXR noted.  - Continue abx  - Continue Vit C, Vit D, Zinc   - completed Dexamethasone   - Completed Remdesivir   - Continue Singulair and Pepcid   - Robitussin PRN for cough  - Tylenol PRN for temp   - Cont Bi-pap machine  -Taper FIO2 if feasible  - ICU management.   - Monitor O2 Sat.  - Monitor LFTs, LDH, D-Dimer, Ferritin, CRP and procalcitonin  - F.U CXR  - Prone positioning as tolerated   - DVT and GI PPX     2. DARRELL   - Newly diagnosed.   - Currently being treated for Covid-19.    3. Gastric Ulcer  - Stool Studies  - Monitor labs  - Continue meds  - GI eval.     4. Hx of Intubation   - S/P intubation x 5 days in 2016 for GIB  - Trachea was damaged during intubation.  - S/P tracheal resection and reconstruction.   - S/P surveillance bronchoscopies   - Request resuscitation measures if needed but wants to be DNI

## 2020-12-13 NOTE — PROGRESS NOTE ADULT - SUBJECTIVE AND OBJECTIVE BOX
Interval Events:  pt in nad    Allergies    No Known Allergies    Intolerances      Endocrine/Metabolic Medications:  dextrose 50% Injectable 12.5 Gram(s) IV Push once  dextrose 50% Injectable 25 Gram(s) IV Push once  glucagon  Injectable 1 milliGRAM(s) IntraMuscular once  insulin lispro (ADMELOG) corrective regimen sliding scale   SubCutaneous three times a day before meals  levothyroxine 100 MICROGram(s) Oral daily      Vital Signs Last 24 Hrs  T(C): 36.8 (13 Dec 2020 08:00), Max: 37 (13 Dec 2020 00:14)  T(F): 98.3 (13 Dec 2020 08:00), Max: 98.6 (13 Dec 2020 00:14)  HR: 84 (13 Dec 2020 08:11) (82 - 119)  BP: 139/82 (13 Dec 2020 08:00) (99/64 - 139/82)  BP(mean): 95 (13 Dec 2020 08:00) (71 - 95)  RR: 25 (13 Dec 2020 08:00) (23 - 49)  SpO2: 88% (13 Dec 2020 08:11) (70% - 93%)      PHYSICAL EXAM  All physical exam findings normal, except those marked:  General:	Alert, active, cooperative, NAD, well hydrated  .		[] Abnormal:  Neck		Normal: supple, no cervical adenopathy, no palpable thyroid  .		[] Abnormal:  Cardiovascular	Normal: regular rate, normal S1, S2, no murmurs  .		[] Abnormal:  Respiratory	Normal: no chest wall deformity, normal respiratory pattern, CTA B/L  .		[] Abnormal:  Abdominal	Normal: soft, ND, NT, bowel sounds present, no masses, no organomegaly  .		[] Abnormal:  		Normal normal genitalia, testes descended, circumcised/uncircumcised  .		Lenore stage:			Breast lenore:  .		Menstrual history:  .		[] Abnormal:  Extremities	Normal: FROM x4  .		[] Abnormal:  Skin		Normal: intact and not indurated, no rash, no acanthosis nigricans  .		[] Abnormal:  Neurologic	Normal: grossly intact  .		[] Abnormal:    LABS                        13.1   12.96 )-----------( 377      ( 13 Dec 2020 06:46 )             40.9                               133    |  98     |  14                  Calcium: 10.4  / iCa: x      (12-13 @ 06:46)    ----------------------------<  179       Magnesium: 2.4                              4.4     |  27     |  0.56             Phosphorous: 3.1      TPro  6.7    /  Alb  1.9    /  TBili  0.6    /  DBili  x      /  AST  77     /  ALT  123    /  AlkPhos  193    13 Dec 2020 06:46    CAPILLARY BLOOD GLUCOSE      POCT Blood Glucose.: 188 mg/dL (13 Dec 2020 05:33)  POCT Blood Glucose.: 134 mg/dL (12 Dec 2020 22:18)  POCT Blood Glucose.: 172 mg/dL (12 Dec 2020 17:07)  POCT Blood Glucose.: 169 mg/dL (12 Dec 2020 11:21)        Assesment/plan      61 yo F from home lives with son (who is autistic, covid +) PMHx of hypothyroid, sleep apnea, PUD, cervical OA PSH tracheal resection and reconstruction, uterous myoma, HTN,  presents to the ED c/o shortness of breath x 2 days. Admitted with COVID PNA. Found to have hypercalcemia. Pt has hx of hyperparathyroidism s/p ? parathyroidectomy.       Problem/Recommendation - 1:  Problem: Hypercalcemia. due to hyperparathyroidism  high vitamin d 1-25 ? etiology- nl ACE levels  hx of parathyroid surgery r/o pth hyperplasia  worsening hypercalcemia - s/p one dose of iv aredia 90mg   cont to monitor calcium  d/w hs       Problem/Recommendation - 2:  ·  Problem: Hypothyroidism.  Recommendation: cont lt4 100 mcg  low tsh likely due to steroids.      Problem/Recommendation - 3:  ·  Problem: COVID-19 virus infection.  Recommendation: cont tx per icu team.     Hyperglycemia- due to steroids   cont  humalog correction doses

## 2020-12-13 NOTE — PROGRESS NOTE ADULT - ASSESSMENT
ASSESSMENT AND PLAN:  59 yo F from home lives with son (who is autistic, covid +) PMHx of hypothyroid, sleep apnea, HTN PUD, cervical OA PSH tracheal resection and reconstruction, uterous myoma, presents to the ED c/o shortness of breath x 2 days. Patient underwent sleep studyfrom 11/20 to 11/21 ,diagnosed with DARRELL, spiked fever the day after 101.6 associated with headache, and later on cough and diarrhea and tested positive for COVID on 111/23rd. pt has been having fever and diarrhea since Dx. Pt notes onset of  worsening shortness of breath  for past 2 days.  Patient denies vomiting, rash , joint pain or any other acute complaints. Pt started ABx likley amoxicillin (does not remember te name ) and Decadron 6mg X5 days.    Of note : pt reports h/o massive GIB resulted in emergency intubation for 5 days. Pt reports her trachea was damaged as a result of intubation. s/p tracheal resection and reconstruction April 2016. She has undergone multiple surveillance bronchoscopies,    ED course : patient saturated on high 80s on RA on arrival , was placed on NRB and descalated to NC 4 liters, pt saturated 96% on 4L NC,  on my evaluation patient was saturating 97% on 2 L NC however tachypneic while talking or getting exited.  CXR : patchy basilar infiltrates   (01 Dec 2020 13:01)  Pt was saturating 93% on 100% NRB, transferred to ICU for high flow oxygen therapy.     Assessment:   Acute hypoxic respiratory failure due to COVID PNA   Sleep Apnea,   PUD, hypothyroidism     === Neuro===  Alert and oriented x 3    #Anxiety  - added Xanax 0.5mg BID as spO2 drops when pt becomes anxious , Patient is extremely anxious, family wants us to limit information delivery to her  - Non compliant with proning recs    #Central sleep apnea?  Called her neurologist Dr. Greg Moreno [phone] for details, but no answer , Will defer it for outpatient     === Cardiovascular===  HTN: not on any medication at home   - continue to monitor for now.     ===- Pulm===  Acute hypoxic respiratory failure due to COVID PNA   -completed remdesevir  - s/p Rocephin and Azith 12/1-3, Dcd as CXR consistent with Covid PNA  - On high flow 97% 55L, Patient's work of breathing is stable- s/p plasma 12/4 as per pt's request. Explained the study doesn't show benefit  - Patient is currently on 50L 100%  saturating above 90%, tachypneic 12/11, desat to 70s on 12/12, increased to 100% 80L, then switched to BiPAP  - completed decadron  - ID Dr Nelson  - Lovenox DVT proph 40 mg daily as D-Dimer is <150   - Follow inflammatory markers on alternate days     #sleep Apnea : pt recently had Sleep study done and diagnosed with Sleep apnea on Nov 20 but not started on CPAP yet   OP follow up     ====ID===  PNA: management as above     === Nephro===  no active issues     ===GI===  #Elevated LFTs  - LFTs trending down  - likely from covid  - Hepatitis C panel negative, Hepatitis B Ab positive  - monitor LFTs    #Hx of PUD:   - c/w Protonix     === Heme===  #Leukocytosis   - Likely due to covid infection  - monitor CBC    ===Endocrine===  #hyperglycemia  -likely from steroids  -sliding scale switched to moderate as per endo  -Endo Dr Lopez    #hypothyroidism   - TSH low 0.14  - T3  48, Free Thyroxine 1.3  - c/w Levothyroxine     #Hypercalcemia  - Elevated calcium 11, vit D-25 WNL,  vitamin D, 1-25 high, PTH: high   -Repeat 1-25 OH elevated  - outpatient workup     === Skin/Catheters===  No rashes. Peripheral IV lines.     ===Prophylaxis ===  LOVenox 40mg daily for DVT proph   Protonix for  GI prophylaxis    ===GOC===  DNR/DNI, discussed with pt and daughter over phone and MOLST form signed and placed in the chart.       ===Disposition===  Monitor in ICU   ASSESSMENT AND PLAN:  61 yo F from home lives with son (who is autistic, covid +) PMHx of hypothyroid, sleep apnea, HTN PUD, cervical OA PSH tracheal resection and reconstruction, uterous myoma, presents to the ED c/o shortness of breath x 2 days. Patient underwent sleep studyfrom 11/20 to 11/21 ,diagnosed with DARRELL, spiked fever the day after 101.6 associated with headache, and later on cough and diarrhea and tested positive for COVID on 111/23rd. pt has been having fever and diarrhea since Dx. Pt notes onset of  worsening shortness of breath  for past 2 days.  Patient denies vomiting, rash , joint pain or any other acute complaints. Pt started ABx likley amoxicillin (does not remember te name ) and Decadron 6mg X5 days.    Of note : pt reports h/o massive GIB resulted in emergency intubation for 5 days. Pt reports her trachea was damaged as a result of intubation. s/p tracheal resection and reconstruction April 2016. She has undergone multiple surveillance bronchoscopies,    ED course : patient saturated on high 80s on RA on arrival , was placed on NRB and descalated to NC 4 liters, pt saturated 96% on 4L NC,  on my evaluation patient was saturating 97% on 2 L NC however tachypneic while talking or getting exited.  CXR : patchy basilar infiltrates   (01 Dec 2020 13:01)  Pt was saturating 93% on 100% NRB, transferred to ICU for high flow oxygen therapy.     Assessment:   Acute hypoxic respiratory failure due to COVID PNA   Sleep Apnea,   PUD, hypothyroidism     === Neuro===  Alert and oriented x 3    #Anxiety  - added Xanax 0.5mg BID as spO2 drops when pt becomes anxious , Patient is extremely anxious, family wants us to limit information delivery to her  - Non compliant with proning recs    #Central sleep apnea?  Called her neurologist Dr. Greg Moreno [phone] for details, but no answer , Will defer it for outpatient     === Cardiovascular===  HTN: not on any medication at home   - continue to monitor for now.     ===- Pulm===  Acute hypoxic respiratory failure due to COVID PNA   -completed remdesevir  - s/p Rocephin and Azith 12/1-3, Dcd as CXR consistent with Covid PNA  - On high flow 97% 55L, Patient's work of breathing is stable- s/p plasma 12/4 as per pt's request. Explained the study doesn't show benefit  - Patient is currently on 50L 100%  saturating above 90%, tachypneic 12/11, desat to 70s on 12/12, increased to 100% 80L, then switched to BiPAP  - completed decadron  - ID Dr Nelson  - Lovenox DVT proph 40 mg daily as D-Dimer is <150   - Follow inflammatory markers on alternate days     #sleep Apnea : pt recently had Sleep study done and diagnosed with Sleep apnea on Nov 20 but not started on CPAP yet   OP follow up     ====ID===  PNA: management as above     === Nephro===  no active issues     ===GI===  #Elevated LFTs  - LFTs trending down  - likely from covid  - Hepatitis C panel negative, Hepatitis B Ab positive  - monitor LFTs    #Hx of PUD:   - c/w Protonix     === Heme===  #Leukocytosis   - Likely due to covid infection  - monitor CBC    ===Endocrine===  #hyperglycemia  -likely from steroids  -sliding scale switched to moderate as per endo  -Endo Dr Lopez    #hypothyroidism   - TSH low 0.14  - T3  48, Free Thyroxine 1.3  - c/w Levothyroxine     #Hypercalcemia  - Elevated calcium 11, vit D-25 WNL,  vitamin D, 1-25 high, PTH: high   -Repeat 1-25 OH elevated  - outpatient workup     === Skin/Catheters===  No rashes. Peripheral IV lines.     ===Prophylaxis ===  LOVenox 40mg daily for DVT proph   Protonix for  GI prophylaxis    ===GOC===  DNR/DNI, discussed with pt and daughter over phone and MOLST form signed and placed in the chart.   daughter now saying patient is expressing willingness to be intubated "if absolutely necessary". Team to continue to discuss with patient and daughter as needed.       ===Disposition===  Monitor in ICU

## 2020-12-13 NOTE — PROGRESS NOTE ADULT - SUBJECTIVE AND OBJECTIVE BOX
Patient is a 60y old  Female who presents with a chief complaint of COVID PNA requiring oxygen supplementation (13 Dec 2020 02:49)    pt seen in icu [ x ], reg med floor [   ], bed [ x ], chair at bedside [   ], a+o x3 [ x ], lethargic [  ],    nad [x  ]        Allergies    No Known Allergies        Vitals    T(F): 98.4 (12-13-20 @ 04:37), Max: 98.6 (12-13-20 @ 00:14)  HR: 87 (12-13-20 @ 06:00) (70 - 119)  BP: 106/69 (12-13-20 @ 06:00) (99/64 - 132/78)  RR: 29 (12-13-20 @ 06:00) (23 - 49)  SpO2: 82% (12-13-20 @ 06:00) (70% - 95%)  Wt(kg): --  CAPILLARY BLOOD GLUCOSE      POCT Blood Glucose.: 188 mg/dL (13 Dec 2020 05:33)      Labs                          13.1   16.11 )-----------( 351      ( 12 Dec 2020 06:33 )             40.3       12-12    132<L>  |  101  |  15  ----------------------------<  193<H>  5.0   |  26  |  0.51    Ca    10.1      12 Dec 2020 06:33  Phos  2.5     12-12  Mg     2.3     12-12    TPro  6.5  /  Alb  1.9<L>  /  TBili  0.4  /  DBili  x   /  AST  22  /  ALT  58  /  AlkPhos  129<H>  12-12                Radiology Results      Meds    MEDICATIONS  (STANDING):  ALBUTerol    90 MICROgram(s) HFA Inhaler 2 Puff(s) Inhalation every 6 hours  ALBUTerol    90 MICROgram(s) HFA Inhaler 1 Puff(s) Inhalation every 4 hours  ALPRAZolam 1 milliGRAM(s) Oral every 12 hours  chlorhexidine 2% Cloths 1 Application(s) Topical <User Schedule>  dextrose 5%. 1000 milliLiter(s) (100 mL/Hr) IV Continuous <Continuous>  dextrose 50% Injectable 12.5 Gram(s) IV Push once  dextrose 50% Injectable 25 Gram(s) IV Push once  enoxaparin Injectable 40 milliGRAM(s) SubCutaneous every 12 hours  glucagon  Injectable 1 milliGRAM(s) IntraMuscular once  insulin lispro (ADMELOG) corrective regimen sliding scale   SubCutaneous three times a day before meals  levothyroxine 100 MICROGram(s) Oral daily  melatonin 5 milliGRAM(s) Oral at bedtime  pantoprazole    Tablet 40 milliGRAM(s) Oral before breakfast  polyethylene glycol 3350 17 Gram(s) Oral daily  senna 2 Tablet(s) Oral at bedtime      MEDICATIONS  (PRN):  acetaminophen   Tablet .. 650 milliGRAM(s) Oral every 6 hours PRN Temp greater or equal to 38C (100.4F), Mild Pain (1 - 3)  acetaminophen   Tablet .. 650 milliGRAM(s) Oral every 6 hours PRN Mild Pain (1 - 3)  morphine  - Injectable 1 milliGRAM(s) IV Push every 4 hours PRN prone      Physical Exam    Neuro :  no focal deficits  Respiratory: CTA B/L  CV: RRR, S1S2, no murmurs,   Abdominal: Soft, NT, ND +BS,  Extremities: No edema, + peripheral pulses          ASSESSMENT    Infection due to severe acute respiratory syndrome coronavirus 2 (SARS-CoV-2)  pneumonia,   hypoxia,   h/o hypothyroid,   sleep apnea,   PUD,   cervical OA   tracheal resection and reconstruction,   uterus myoma,   HTN,   gi bleed  Osteoporosis  Hyperparathyroidism      PLAN    id f/u   Decadron d/c'd 12/10/20  completed Remdesivir 12/06/20  s/p convalescent plasma  cont contact and airborne isolation,   cont albuterol inhaler,   cont tylenol prn, robitussin prn   pulm f/u   O2 sat (82% - 95%) on hiflo   bipap as needed,   pt afebrile  dvt prophylaxis  covid-19 antibody test neg,   resp viral panel positive for covid 19,   procalcitonin, D-dimer wnl noted     esr, crp, ldh, ferritin elevated noted    endo f/u  hypercalcemia due to hyperparathyroidism  high vitamin d 1-25 ? etiology-   ace lev wnl   hx of parathyroid surgery r/o pth hyperplasia  s/p one dose of iv aredia 90mg   calcium improving  cont to monitor calcium  keep pt hydrated   cont current meds  mgmt as per icu       Patient is a 60y old  Female who presents with a chief complaint of COVID PNA requiring oxygen supplementation (13 Dec 2020 02:49)    pt seen in icu [ x ], reg med floor [   ], bed [ x ], chair at bedside [   ], a+o x3 [ x ], lethargic [  ],    nad [x  ]        Allergies    No Known Allergies        Vitals    T(F): 98.4 (12-13-20 @ 04:37), Max: 98.6 (12-13-20 @ 00:14)  HR: 87 (12-13-20 @ 06:00) (70 - 119)  BP: 106/69 (12-13-20 @ 06:00) (99/64 - 132/78)  RR: 29 (12-13-20 @ 06:00) (23 - 49)  SpO2: 82% (12-13-20 @ 06:00) (70% - 95%)  Wt(kg): --  CAPILLARY BLOOD GLUCOSE      POCT Blood Glucose.: 188 mg/dL (13 Dec 2020 05:33)      Labs                          13.1   16.11 )-----------( 351      ( 12 Dec 2020 06:33 )             40.3       12-12    132<L>  |  101  |  15  ----------------------------<  193<H>  5.0   |  26  |  0.51    Ca    10.1      12 Dec 2020 06:33  Phos  2.5     12-12  Mg     2.3     12-12    TPro  6.5  /  Alb  1.9<L>  /  TBili  0.4  /  DBili  x   /  AST  22  /  ALT  58  /  AlkPhos  129<H>  12-12                Radiology Results      Meds    MEDICATIONS  (STANDING):  ALBUTerol    90 MICROgram(s) HFA Inhaler 2 Puff(s) Inhalation every 6 hours  ALBUTerol    90 MICROgram(s) HFA Inhaler 1 Puff(s) Inhalation every 4 hours  ALPRAZolam 1 milliGRAM(s) Oral every 12 hours  chlorhexidine 2% Cloths 1 Application(s) Topical <User Schedule>  dextrose 5%. 1000 milliLiter(s) (100 mL/Hr) IV Continuous <Continuous>  dextrose 50% Injectable 12.5 Gram(s) IV Push once  dextrose 50% Injectable 25 Gram(s) IV Push once  enoxaparin Injectable 40 milliGRAM(s) SubCutaneous every 12 hours  glucagon  Injectable 1 milliGRAM(s) IntraMuscular once  insulin lispro (ADMELOG) corrective regimen sliding scale   SubCutaneous three times a day before meals  levothyroxine 100 MICROGram(s) Oral daily  melatonin 5 milliGRAM(s) Oral at bedtime  pantoprazole    Tablet 40 milliGRAM(s) Oral before breakfast  polyethylene glycol 3350 17 Gram(s) Oral daily  senna 2 Tablet(s) Oral at bedtime      MEDICATIONS  (PRN):  acetaminophen   Tablet .. 650 milliGRAM(s) Oral every 6 hours PRN Temp greater or equal to 38C (100.4F), Mild Pain (1 - 3)  acetaminophen   Tablet .. 650 milliGRAM(s) Oral every 6 hours PRN Mild Pain (1 - 3)  morphine  - Injectable 1 milliGRAM(s) IV Push every 4 hours PRN prone      Physical Exam    Neuro :  no focal deficits  Respiratory: CTA B/L  CV: RRR, S1S2, no murmurs,   Abdominal: Soft, NT, ND +BS,  Extremities: No edema, + peripheral pulses          ASSESSMENT    Infection due to severe acute respiratory syndrome coronavirus 2 (SARS-CoV-2)  pneumonia,   hypoxia,   h/o hypothyroid,   sleep apnea,   PUD,   cervical OA   tracheal resection and reconstruction,   uterus myoma,   HTN,   gi bleed  Osteoporosis  Hyperparathyroidism      PLAN    id f/u   Decadron d/c'd 12/10/20  completed Remdesivir 12/06/20  s/p convalescent plasma  cont contact and airborne isolation,   cont albuterol inhaler,   cont tylenol prn, robitussin prn   pulm f/u   O2 sat (70% - 95%) on bipap   cont bipap as needed,   pt afebrile  dvt prophylaxis  covid-19 antibody test neg,   resp viral panel positive for covid 19,   procalcitonin, D-dimer wnl noted     esr, crp, ldh, ferritin elevated noted    endo f/u  hypercalcemia due to hyperparathyroidism  high vitamin d 1-25 ? etiology-   ace lev wnl   hx of parathyroid surgery r/o pth hyperplasia  s/p one dose of iv aredia 90mg   cont to monitor calcium  keep pt hydrated   cont current meds  mgmt as per icu

## 2020-12-13 NOTE — PROGRESS NOTE ADULT - SUBJECTIVE AND OBJECTIVE BOX
Patient is a 60y old  Female who presents with a chief complaint of COVID PNA requiring oxygen supplementation (13 Dec 2020 11:05)  Patient is Awake, alert, comfortable in bed in NAD. Currently on Bi-pap machine. Oxygen sat 96% on 100% FIO2    INTERVAL HPI/OVERNIGHT EVENTS:      VITAL SIGNS:  T(F): 98.3 (12-13-20 @ 08:00)  HR: 84 (12-13-20 @ 08:11)  BP: 139/82 (12-13-20 @ 08:00)  RR: 25 (12-13-20 @ 08:00)  SpO2: 88% (12-13-20 @ 08:11)  Wt(kg): --  I&O's Detail    12 Dec 2020 07:01  -  13 Dec 2020 07:00  --------------------------------------------------------  IN:    Oral Fluid: 780 mL  Total IN: 780 mL    OUT:    Voided (mL): 1975 mL  Total OUT: 1975 mL    Total NET: -1195 mL              REVIEW OF SYSTEMS:    CONSTITUTIONAL:  No fevers, chills, sweats    HEENT:  Eyes:  No diplopia or blurred vision. ENT:  No earache, sore throat or runny nose.    CARDIOVASCULAR:  No pressure, squeezing, tightness, or heaviness about the chest; no palpitations.    RESPIRATORY:  Per HPI    GASTROINTESTINAL:  No abdominal pain, nausea, vomiting or diarrhea.    GENITOURINARY:  No dysuria, frequency or urgency.    NEUROLOGIC:  No paresthesias, fasciculations, seizures or weakness.    PSYCHIATRIC:  No disorder of thought or mood.      PHYSICAL EXAM:    Constitutional: Well developed and nourished  Eyes:Perrla  ENMT: normal  Neck:supple  Respiratory: good air entry  Cardiovascular: S1 S2 regular  Gastrointestinal: Soft, Non tender  Extremities: No edema  Vascular:normal  Neurological:Awake, alert,Ox3  Musculoskeletal:Normal      MEDICATIONS  (STANDING):  ALBUTerol    90 MICROgram(s) HFA Inhaler 1 Puff(s) Inhalation every 4 hours  ALBUTerol    90 MICROgram(s) HFA Inhaler 2 Puff(s) Inhalation every 6 hours  ALPRAZolam 1 milliGRAM(s) Oral every 12 hours  chlorhexidine 2% Cloths 1 Application(s) Topical <User Schedule>  dextrose 5%. 1000 milliLiter(s) (100 mL/Hr) IV Continuous <Continuous>  dextrose 50% Injectable 12.5 Gram(s) IV Push once  dextrose 50% Injectable 25 Gram(s) IV Push once  enoxaparin Injectable 40 milliGRAM(s) SubCutaneous every 12 hours  glucagon  Injectable 1 milliGRAM(s) IntraMuscular once  insulin lispro (ADMELOG) corrective regimen sliding scale   SubCutaneous three times a day before meals  levothyroxine 100 MICROGram(s) Oral daily  melatonin 5 milliGRAM(s) Oral at bedtime  pantoprazole    Tablet 40 milliGRAM(s) Oral before breakfast  polyethylene glycol 3350 17 Gram(s) Oral daily  senna 2 Tablet(s) Oral at bedtime    MEDICATIONS  (PRN):  acetaminophen   Tablet .. 650 milliGRAM(s) Oral every 6 hours PRN Temp greater or equal to 38C (100.4F), Mild Pain (1 - 3)  acetaminophen   Tablet .. 650 milliGRAM(s) Oral every 6 hours PRN Mild Pain (1 - 3)  morphine  - Injectable 1 milliGRAM(s) IV Push every 4 hours PRN prone      Allergies    No Known Allergies    Intolerances        LABS:                        13.1   12.96 )-----------( 377      ( 13 Dec 2020 06:46 )             40.9     12-13    133<L>  |  98  |  14  ----------------------------<  179<H>  4.4   |  27  |  0.56    Ca    10.4      13 Dec 2020 06:46  Phos  3.1     12-13  Mg     2.4     12-13    TPro  6.7  /  Alb  1.9<L>  /  TBili  0.6  /  DBili  x   /  AST  77<H>  /  ALT  123<H>  /  AlkPhos  193<H>  12-13          CARDIAC MARKERS ( 13 Dec 2020 06:46 )  <0.015 ng/mL / x     / 19 U/L / x     / <1.0 ng/mL      CAPILLARY BLOOD GLUCOSE      POCT Blood Glucose.: 188 mg/dL (13 Dec 2020 05:33)  POCT Blood Glucose.: 134 mg/dL (12 Dec 2020 22:18)  POCT Blood Glucose.: 172 mg/dL (12 Dec 2020 17:07)    pro-bnp -- 12-13 @ 06:46     d-dimer 468  12-13 @ 06:46      RADIOLOGY & ADDITIONAL TESTS:    CXR:  < from: Xray Chest 1 View- PORTABLE-Routine (Xray Chest 1 View- PORTABLE-Routine in AM.) (12.12.20 @ 10:32) >  FINDINGS:  The lungs show unchanged scattered and diffuse multifocal ill-defined airspace consolidations  . No pneumothorax.    The heart and mediastinum are within normal limits.    Visualized osseous structures are intact.        IMPRESSION:   No interval change..      < end of copied text >    Ct scan chest:    ekg;    echo:

## 2020-12-13 NOTE — PROGRESS NOTE ADULT - SUBJECTIVE AND OBJECTIVE BOX
INTERVAL HPI/OVERNIGHT EVENTS:     Pt was very anxious, given one dose of citalopram, DCd as it doesn't work soon. Pt complaint chest pain and desaturated to 70s. Daughter convinced the pt to revoke DNI and pt was agreeable, however spO2 improved after BiPAP use.      Antimicrobial:    Cardiovascular:    Pulmonary:  ALBUTerol    90 MICROgram(s) HFA Inhaler 1 Puff(s) Inhalation every 4 hours  ALBUTerol    90 MICROgram(s) HFA Inhaler 2 Puff(s) Inhalation every 6 hours    Hematalogic:  enoxaparin Injectable 40 milliGRAM(s) SubCutaneous every 12 hours    Other:  acetaminophen   Tablet .. 650 milliGRAM(s) Oral every 6 hours PRN  acetaminophen   Tablet .. 650 milliGRAM(s) Oral every 6 hours PRN  ALPRAZolam 1 milliGRAM(s) Oral every 12 hours  chlorhexidine 2% Cloths 1 Application(s) Topical <User Schedule>  dextrose 5%. 1000 milliLiter(s) IV Continuous <Continuous>  dextrose 50% Injectable 25 Gram(s) IV Push once  dextrose 50% Injectable 12.5 Gram(s) IV Push once  glucagon  Injectable 1 milliGRAM(s) IntraMuscular once  insulin lispro (ADMELOG) corrective regimen sliding scale   SubCutaneous three times a day before meals  levothyroxine 100 MICROGram(s) Oral daily  melatonin 5 milliGRAM(s) Oral at bedtime  morphine  - Injectable 1 milliGRAM(s) IV Push every 4 hours PRN  pantoprazole    Tablet 40 milliGRAM(s) Oral before breakfast  polyethylene glycol 3350 17 Gram(s) Oral daily  senna 2 Tablet(s) Oral at bedtime    acetaminophen   Tablet .. 650 milliGRAM(s) Oral every 6 hours PRN  acetaminophen   Tablet .. 650 milliGRAM(s) Oral every 6 hours PRN  ALBUTerol    90 MICROgram(s) HFA Inhaler 1 Puff(s) Inhalation every 4 hours  ALBUTerol    90 MICROgram(s) HFA Inhaler 2 Puff(s) Inhalation every 6 hours  ALPRAZolam 1 milliGRAM(s) Oral every 12 hours  chlorhexidine 2% Cloths 1 Application(s) Topical <User Schedule>  dextrose 5%. 1000 milliLiter(s) IV Continuous <Continuous>  dextrose 50% Injectable 25 Gram(s) IV Push once  dextrose 50% Injectable 12.5 Gram(s) IV Push once  enoxaparin Injectable 40 milliGRAM(s) SubCutaneous every 12 hours  glucagon  Injectable 1 milliGRAM(s) IntraMuscular once  insulin lispro (ADMELOG) corrective regimen sliding scale   SubCutaneous three times a day before meals  levothyroxine 100 MICROGram(s) Oral daily  melatonin 5 milliGRAM(s) Oral at bedtime  morphine  - Injectable 1 milliGRAM(s) IV Push every 4 hours PRN  pantoprazole    Tablet 40 milliGRAM(s) Oral before breakfast  polyethylene glycol 3350 17 Gram(s) Oral daily  senna 2 Tablet(s) Oral at bedtime    Drug Dosing Weight  Height (cm): 162.6 (01 Dec 2020 09:53)  Weight (kg): 85.7 (01 Dec 2020 09:53)  BMI (kg/m2): 32.4 (01 Dec 2020 09:53)  BSA (m2): 1.91 (01 Dec 2020 09:53)    PRESSORS: [ ] YES [x ]No  WHICH:    CENTRAL LINE: [ ] YES [x ] NO  LOCATION:   DATE INSERTED:  REMOVE: [ ] YES [ ] NO  EXPLAIN:    DONALD: [ ] YES [x ] NO    DATE INSERTED:  REMOVE:  [ ] YES [ ] NO  EXPLAIN:    A-LINE:  [ ] YES [x ] NO  LOCATION:   DATE INSERTED:  REMOVE:  [ ] YES [ ] NO  EXPLAIN:    PMH -reviewed admission note, no change since admission  PAST MEDICAL & SURGICAL HISTORY:  Lumbar back pain    Osteoporosis    History of tracheal stenosis    Other specified diseases of upper respiratory tract    Hypothyroidism    Kidney stones    Gallstones    Fatty liver    Thyroid disease    Obesity    Constipation    Gastric ulcer  February 2016- h/o GI bleed which patient had to be intubated Jan 2016    Arthritis of shoulder region, right    Arthritis  right shoulder    S/P bronchoscopy  2017    S/P bronchoscopy  5/16    Hyperparathyroidism  2012 parathyroidectomy    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    Gastric ulcer  &quot;four endoscopies&quot; for diagnosis of gastric ulcer in February 2016        ICU Vital Signs Last 24 Hrs  T(C): 37 (13 Dec 2020 00:14), Max: 37 (13 Dec 2020 00:14)  T(F): 98.6 (13 Dec 2020 00:14), Max: 98.6 (13 Dec 2020 00:14)  HR: 96 (13 Dec 2020 02:00) (70 - 119)  BP: 99/64 (13 Dec 2020 02:00) (99/64 - 132/78)  BP(mean): 71 (13 Dec 2020 02:00) (71 - 91)  ABP: --  ABP(mean): --  RR: 36 (13 Dec 2020 02:00) (19 - 49)  SpO2: 85% (13 Dec 2020 02:00) (70% - 95%)            12-11 @ 07:01  -  12-12 @ 07:00  --------------------------------------------------------  IN: 180 mL / OUT: 550 mL / NET: -370 mL            PHYSICAL EXAM:    GENERAL: NAD, well-groomed, anxious  HEAD:  Atraumatic, Normocephalic  EYES: EOMI, PERRLA, conjunctiva and sclera clear  ENMT: (+) HFNC then BiPAP, No tonsillar erythema, exudates, or enlargement; Moist mucous membranes, Good dentition, [ ]No lesions  NECK: Supple, normal appearance, No JVD; Normal thyroid; Trachea midline  NERVOUS SYSTEM:  Alert & Oriented X3  CHEST/LUNG: No chest deformity; Normal percussion bilaterally; No rales, rhonchi, wheezing   HEART: Regular rate and rhythm; No murmurs, rubs, or gallops  ABDOMEN: Soft, Nontender, Nondistended;Bowel sounds present  EXTREMITIES:  2+ Peripheral Pulses, No clubbing, cyanosis, or edema  LYMPH: No lymphadenopathy noted  SKIN: No rashes or lesions; Good capillary refill    LABS:  CBC Full  -  ( 12 Dec 2020 06:33 )  WBC Count : 16.11 K/uL  RBC Count : 4.16 M/uL  Hemoglobin : 13.1 g/dL  Hematocrit : 40.3 %  Platelet Count - Automated : 351 K/uL  Mean Cell Volume : 96.9 fl  Mean Cell Hemoglobin : 31.5 pg  Mean Cell Hemoglobin Concentration : 32.5 gm/dL  Auto Neutrophil # : x  Auto Lymphocyte # : x  Auto Monocyte # : x  Auto Eosinophil # : x  Auto Basophil # : x  Auto Neutrophil % : x  Auto Lymphocyte % : x  Auto Monocyte % : x  Auto Eosinophil % : x  Auto Basophil % : x    12-12    132<L>  |  101  |  15  ----------------------------<  193<H>  5.0   |  26  |  0.51    Ca    10.1      12 Dec 2020 06:33  Phos  2.5     12-12  Mg     2.3     12-12    TPro  6.5  /  Alb  1.9<L>  /  TBili  0.4  /  DBili  x   /  AST  22  /  ALT  58  /  AlkPhos  129<H>  12-12            RADIOLOGY & ADDITIONAL STUDIES REVIEWED:  ***    [ ]GOALS OF CARE DISCUSSION WITH PATIENT/FAMILY/PROXY:    CRITICAL CARE TIME SPENT: 35 minutes INTERVAL HPI/OVERNIGHT EVENTS:     Pt was very anxious, given one dose of citalopram, DCd as it doesn't work soon. Pt complaint chest pain and desaturated to 70s. Daughter convinced the pt to revoke DNI and pt was agreeable, however spO2 improved after BiPAP use, will continue to discuss GOC. Patient complaining of CP this am, trop negative x1.       Antimicrobial:    Cardiovascular:    Pulmonary:  ALBUTerol    90 MICROgram(s) HFA Inhaler 1 Puff(s) Inhalation every 4 hours  ALBUTerol    90 MICROgram(s) HFA Inhaler 2 Puff(s) Inhalation every 6 hours    Hematalogic:  enoxaparin Injectable 40 milliGRAM(s) SubCutaneous every 12 hours    Other:  acetaminophen   Tablet .. 650 milliGRAM(s) Oral every 6 hours PRN  acetaminophen   Tablet .. 650 milliGRAM(s) Oral every 6 hours PRN  ALPRAZolam 1 milliGRAM(s) Oral every 12 hours  chlorhexidine 2% Cloths 1 Application(s) Topical <User Schedule>  dextrose 5%. 1000 milliLiter(s) IV Continuous <Continuous>  dextrose 50% Injectable 25 Gram(s) IV Push once  dextrose 50% Injectable 12.5 Gram(s) IV Push once  glucagon  Injectable 1 milliGRAM(s) IntraMuscular once  insulin lispro (ADMELOG) corrective regimen sliding scale   SubCutaneous three times a day before meals  levothyroxine 100 MICROGram(s) Oral daily  melatonin 5 milliGRAM(s) Oral at bedtime  morphine  - Injectable 1 milliGRAM(s) IV Push every 4 hours PRN  pantoprazole    Tablet 40 milliGRAM(s) Oral before breakfast  polyethylene glycol 3350 17 Gram(s) Oral daily  senna 2 Tablet(s) Oral at bedtime    acetaminophen   Tablet .. 650 milliGRAM(s) Oral every 6 hours PRN  acetaminophen   Tablet .. 650 milliGRAM(s) Oral every 6 hours PRN  ALBUTerol    90 MICROgram(s) HFA Inhaler 1 Puff(s) Inhalation every 4 hours  ALBUTerol    90 MICROgram(s) HFA Inhaler 2 Puff(s) Inhalation every 6 hours  ALPRAZolam 1 milliGRAM(s) Oral every 12 hours  chlorhexidine 2% Cloths 1 Application(s) Topical <User Schedule>  dextrose 5%. 1000 milliLiter(s) IV Continuous <Continuous>  dextrose 50% Injectable 25 Gram(s) IV Push once  dextrose 50% Injectable 12.5 Gram(s) IV Push once  enoxaparin Injectable 40 milliGRAM(s) SubCutaneous every 12 hours  glucagon  Injectable 1 milliGRAM(s) IntraMuscular once  insulin lispro (ADMELOG) corrective regimen sliding scale   SubCutaneous three times a day before meals  levothyroxine 100 MICROGram(s) Oral daily  melatonin 5 milliGRAM(s) Oral at bedtime  morphine  - Injectable 1 milliGRAM(s) IV Push every 4 hours PRN  pantoprazole    Tablet 40 milliGRAM(s) Oral before breakfast  polyethylene glycol 3350 17 Gram(s) Oral daily  senna 2 Tablet(s) Oral at bedtime    Drug Dosing Weight  Height (cm): 162.6 (01 Dec 2020 09:53)  Weight (kg): 85.7 (01 Dec 2020 09:53)  BMI (kg/m2): 32.4 (01 Dec 2020 09:53)  BSA (m2): 1.91 (01 Dec 2020 09:53)    PRESSORS: [ ] YES [x ]No  WHICH:    CENTRAL LINE: [ ] YES [x ] NO  LOCATION:   DATE INSERTED:  REMOVE: [ ] YES [ ] NO  EXPLAIN:    DONALD: [ ] YES [x ] NO    DATE INSERTED:  REMOVE:  [ ] YES [ ] NO  EXPLAIN:    A-LINE:  [ ] YES [x ] NO  LOCATION:   DATE INSERTED:  REMOVE:  [ ] YES [ ] NO  EXPLAIN:    PMH -reviewed admission note, no change since admission  PAST MEDICAL & SURGICAL HISTORY:  Lumbar back pain    Osteoporosis    History of tracheal stenosis    Other specified diseases of upper respiratory tract    Hypothyroidism    Kidney stones    Gallstones    Fatty liver    Thyroid disease    Obesity    Constipation    Gastric ulcer  February 2016- h/o GI bleed which patient had to be intubated Jan 2016    Arthritis of shoulder region, right    Arthritis  right shoulder    S/P bronchoscopy  2017    S/P bronchoscopy  5/16    Hyperparathyroidism  2012 parathyroidectomy    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    Gastric ulcer  &quot;four endoscopies&quot; for diagnosis of gastric ulcer in February 2016        ICU Vital Signs Last 24 Hrs  T(C): 37 (13 Dec 2020 00:14), Max: 37 (13 Dec 2020 00:14)  T(F): 98.6 (13 Dec 2020 00:14), Max: 98.6 (13 Dec 2020 00:14)  HR: 96 (13 Dec 2020 02:00) (70 - 119)  BP: 99/64 (13 Dec 2020 02:00) (99/64 - 132/78)  BP(mean): 71 (13 Dec 2020 02:00) (71 - 91)  ABP: --  ABP(mean): --  RR: 36 (13 Dec 2020 02:00) (19 - 49)  SpO2: 85% (13 Dec 2020 02:00) (70% - 95%)            12-11 @ 07:01  -  12-12 @ 07:00  --------------------------------------------------------  IN: 180 mL / OUT: 550 mL / NET: -370 mL            PHYSICAL EXAM:    GENERAL: NAD, well-groomed, anxious  HEAD:  Atraumatic, Normocephalic  EYES: EOMI, PERRLA, conjunctiva and sclera clear  ENMT: (+) HFNC then BiPAP, No tonsillar erythema, exudates, or enlargement; Moist mucous membranes, Good dentition, [ ]No lesions  NECK: Supple, normal appearance, No JVD; Normal thyroid; Trachea midline  NERVOUS SYSTEM:  Alert & Oriented X3  CHEST/LUNG: No chest deformity; Normal percussion bilaterally; No rales, rhonchi, wheezing, BiPAP in place  HEART: Regular rate and rhythm; No murmurs, rubs, or gallops  ABDOMEN: Soft, Nontender, Nondistended;Bowel sounds present  EXTREMITIES:  2+ Peripheral Pulses, No clubbing, cyanosis, or edema  LYMPH: No lymphadenopathy noted  SKIN: No rashes or lesions; Good capillary refill    LABS:  CBC Full  -  ( 12 Dec 2020 06:33 )  WBC Count : 16.11 K/uL  RBC Count : 4.16 M/uL  Hemoglobin : 13.1 g/dL  Hematocrit : 40.3 %  Platelet Count - Automated : 351 K/uL  Mean Cell Volume : 96.9 fl  Mean Cell Hemoglobin : 31.5 pg  Mean Cell Hemoglobin Concentration : 32.5 gm/dL  Auto Neutrophil # : x  Auto Lymphocyte # : x  Auto Monocyte # : x  Auto Eosinophil # : x  Auto Basophil # : x  Auto Neutrophil % : x  Auto Lymphocyte % : x  Auto Monocyte % : x  Auto Eosinophil % : x  Auto Basophil % : x    12-12    132<L>  |  101  |  15  ----------------------------<  193<H>  5.0   |  26  |  0.51    Ca    10.1      12 Dec 2020 06:33  Phos  2.5     12-12  Mg     2.3     12-12    TPro  6.5  /  Alb  1.9<L>  /  TBili  0.4  /  DBili  x   /  AST  22  /  ALT  58  /  AlkPhos  129<H>  12-12            RADIOLOGY & ADDITIONAL STUDIES REVIEWED:  ***    [ ]GOALS OF CARE DISCUSSION WITH PATIENT/FAMILY/PROXY:    CRITICAL CARE TIME SPENT: 35 minutes

## 2020-12-14 NOTE — CONSULT NOTE ADULT - PROBLEM SELECTOR RECOMMENDATION 4
patient from home; A&O, ambulatory, independent of ADLs at baseline. Now with MOF - on bipap, + shock 2/2 PNA due to covid-19. Patient sedated, dependent on ADLs. Patient from home; A&O, ambulatory, independent of ADLs at baseline. Now with acute respiratory failure requiring bipap; + shock on pressor, elevated LFTs.  Patient with precedex; dependent on ADLs.

## 2020-12-14 NOTE — PROCEDURE NOTE - NSINFORMCONSENT_GEN_A_CORE
BY HCP/Benefits, risks, and possible complications of procedure explained to patient/caregiver who verbalized understanding and gave verbal consent.

## 2020-12-14 NOTE — CONSULT NOTE ADULT - PROBLEM SELECTOR RECOMMENDATION 2
2/2 COVID-19 PNA; comorbid MOF involving lungs, heart, shock liver. CXR indicates unchanged scattered and diffuse multifocal ill-defined airspace consolidations. Patient on sedation, bipap, IV abx, morphine PRN. Patient completed remdesivir and decadron. High risk of intubation. Daughter/Sandra is HCP - form on file. 2/2 COVID-19 PNA. CXR indicates unchanged scattered and diffuse multifocal ill-defined airspace consolidations. Patient on sedation, bipap, IV abx, morphine PRN. Patient completed remdesivir and decadron. Daughter/Sandra is HCP; DNR / DNI status confirmed.

## 2020-12-14 NOTE — CONSULT NOTE ADULT - NSGCTIMESPENT_GEN_ALL_CORE
Pt presents to the [address] [place] [address] today and states & C/O:  Having a sore throat and HA for over 1 week. Pt has some phlegm that comes with the sore throat. [facility]

## 2020-12-14 NOTE — CONSULT NOTE ADULT - PROBLEM SELECTOR RECOMMENDATION 9
2/2 shock due to COVID-19 PNA; involving lungs (patient on bipap); heart (on pressor); + shock liver (Alk Phos 306, , ). CXR indicates unchanged scattered and diffuse multifocal ill-defined airspace consolidations. Patient on sedation, bipap, IV abx, morphine PRN. Patient completed remdesivir and decadron. High risk of intubation. Daughter/Sandra is HCP - form on file. 2/2 shock due to COVID-19 PNA. CXR indicates unchanged scattered and diffuse multifocal ill-defined airspace consolidations. Patient on sedation, bipap, IV abx, morphine PRN. Patient completed remdesivir and decadron. Daughter/Sandra is HCP - form on file; agreeable for central line and pressor support. DNR / DNI status confirmed.

## 2020-12-14 NOTE — PROGRESS NOTE ADULT - ASSESSMENT
ASSESSMENT AND PLAN:  59 yo F from home lives with son (who is autistic, covid +) PMHx of hypothyroid, sleep apnea, HTN PUD, cervical OA PSH tracheal resection and reconstruction, uterous myoma, presents to the ED c/o shortness of breath x 2 days. Patient underwent sleep studyfrom 11/20 to 11/21 ,diagnosed with DARRELL, spiked fever the day after 101.6 associated with headache, and later on cough and diarrhea and tested positive for COVID on 111/23rd. pt has been having fever and diarrhea since Dx. Pt notes onset of  worsening shortness of breath  for past 2 days.  Patient denies vomiting, rash , joint pain or any other acute complaints. Pt started ABx likley amoxicillin (does not remember te name ) and Decadron 6mg X5 days.    Of note : pt reports h/o massive GIB resulted in emergency intubation for 5 days. Pt reports her trachea was damaged as a result of intubation. s/p tracheal resection and reconstruction April 2016. She has undergone multiple surveillance bronchoscopies,    ED course : patient saturated on high 80s on RA on arrival , was placed on NRB and descalated to NC 4 liters, pt saturated 96% on 4L NC,  on my evaluation patient was saturating 97% on 2 L NC however tachypneic while talking or getting exited.  CXR : patchy basilar infiltrates   (01 Dec 2020 13:01)  Pt admitted to ICU  for AHRF requiring high flow oxygen therapy and bipap    Assessment:   Acute hypoxic respiratory failure due to COVID PNA   Sleep Apnea,   PUD, hypothyroidism     === Neuro===  Alert and oriented x 3 Baseline   12/14 pt is sedated for comfort with morphine 1q1 prn     #Anxiety  - started on precedex as becomes anxious and started on morphine 12/14 for respiratory distress   - Non compliant with proning recs    #Central sleep apnea?  Called her neurologist Dr. Greg Moreno [phone] for details, but no answer , started on BIPAP     === Cardiovascular===  HTN: not on any medication at home   - continue to monitor for now.     ===- Pulm===  Acute hypoxic respiratory failure due to COVID PNA   -completed remdesevir  - s/p Rocephin and Azith 12/1-3, Dcd and started on zosyn for possible aspiration pNA   - On bipap sat'ing in 80's, Patient's work of breathing is worsening - s/p plasma 12/4 as per pt's request. Explained the study doesn't show benefit  - completed decadron  - ID Dr Nelson  - Patient is currently on  to BiPAP saturating in 80%, RR in 40's. Pt started on morphine 1q1 prn as pt in DNI  - Follow inflammatory markers QD     #sleep Apnea : pt recently had Sleep study done and diagnosed with Sleep apnea on Nov 20 but not started on CPAP yet   OP follow up     ====ID===  PNA: management as above     === Nephro===  no active issues     ===GI===  #Elevated LFTs  - LFTs trending down  - likely from covid  - Hepatitis C panel negative, Hepatitis B Ab positive  - monitor LFTs    #Hx of PUD:   - c/w Protonix     === Heme===  #Leukocytosis   - Likely due to covid infection  - monitor CBC    ===Endocrine===  #hyperglycemia  a1c 5.8 .  -sliding scale switched to moderate as per endo  -Endo Dr Lopez    #hypothyroidism   - TSH low 0.14  - T3  48, Free Thyroxine 1.3  - c/w Levothyroxine     #Hypercalcemia  - Elevated calcium 11, vit D-25 WNL,  vitamin D, 1-25 high, PTH: high   -Repeat 1-25 OH elevated  - outpatient workup     === Skin/Catheters===  No rashes.   RIJ placed 12/14    ===Prophylaxis ===  LOVenox 40mg daily for DVT proph   Protonix for  GI prophylaxis    ===GOC===  DNR/DNI, discussed with daughter over phone and MOLST form signed and placed in the chart 12/14.       ===Disposition===  Monitor in ICU

## 2020-12-14 NOTE — CONSULT NOTE ADULT - PROBLEM SELECTOR RECOMMENDATION 3
comorbid shock, MOF involving lungs, heart, liver. CXR indicates unchanged scattered and diffuse multifocal ill-defined airspace consolidations. Patient on sedation, bipap, IV abx, morphine PRN. Patient completed remdesivir and decadron. High risk of intubation. Daughter/Sandra is HCP - form on file. comorbid shock, MOF involving lungs, heart, liver. CXR indicates unchanged scattered and diffuse multifocal ill-defined airspace consolidations. Patient on sedation, bipap, IV abx, morphine PRN. Patient completed remdesivir and decadron. Daughter/Sandra is HCP; DNR / DNI.

## 2020-12-14 NOTE — PROGRESS NOTE ADULT - SUBJECTIVE AND OBJECTIVE BOX
Patient is a 60y old  Female who presents with a chief complaint of COVID PNA requiring oxygen supplementation (14 Dec 2020 11:07)  Patient is sedated, laying in bed on Bi-pap machine but in NAD. oxygen sat 88%    INTERVAL HPI/OVERNIGHT EVENTS:      VITAL SIGNS:  T(F): 99.4 (12-14-20 @ 08:00)  HR: 73 (12-14-20 @ 11:00)  BP: 101/68 (12-14-20 @ 11:00)  RR: 41 (12-14-20 @ 11:00)  SpO2: 82% (12-14-20 @ 11:00)  Wt(kg): --  I&O's Detail    13 Dec 2020 07:01  -  14 Dec 2020 07:00  --------------------------------------------------------  IN:    Dexmedetomidine: 25.5 mL  Total IN: 25.5 mL    OUT:    Voided (mL): 1200 mL  Total OUT: 1200 mL    Total NET: -1174.5 mL              REVIEW OF SYSTEMS:    CONSTITUTIONAL:  No fevers, chills, sweats    HEENT:  Eyes:  No diplopia or blurred vision. ENT:  No earache, sore throat or runny nose.    CARDIOVASCULAR:  No pressure, squeezing, tightness, or heaviness about the chest; no palpitations.    RESPIRATORY:  Per HPI    GASTROINTESTINAL:  No abdominal pain, nausea, vomiting or diarrhea.    GENITOURINARY:  No dysuria, frequency or urgency.    NEUROLOGIC:  No paresthesias, fasciculations, seizures or weakness.    PSYCHIATRIC:  No disorder of thought or mood.      PHYSICAL EXAM:    Constitutional: Well developed and nourished  Eyes:Perrla  ENMT: normal  Neck:supple  Respiratory: good air entry  Cardiovascular: S1 S2 regular  Gastrointestinal: Soft, Non tender  Extremities: No edema  Vascular:normal  Neurological:Awake, alert,Ox3  Musculoskeletal:Normal      MEDICATIONS  (STANDING):  ALBUTerol    90 MICROgram(s) HFA Inhaler 1 Puff(s) Inhalation every 4 hours  ALBUTerol    90 MICROgram(s) HFA Inhaler 2 Puff(s) Inhalation every 6 hours  chlorhexidine 2% Cloths 1 Application(s) Topical <User Schedule>  dexMEDEtomidine Infusion 0.2 MICROgram(s)/kG/Hr (4.29 mL/Hr) IV Continuous <Continuous>  dextrose 5%. 1000 milliLiter(s) (100 mL/Hr) IV Continuous <Continuous>  dextrose 50% Injectable 25 Gram(s) IV Push once  dextrose 50% Injectable 12.5 Gram(s) IV Push once  enoxaparin Injectable 40 milliGRAM(s) SubCutaneous every 12 hours  glucagon  Injectable 1 milliGRAM(s) IntraMuscular once  insulin lispro (ADMELOG) corrective regimen sliding scale   SubCutaneous three times a day before meals  levothyroxine 100 MICROGram(s) Oral daily  melatonin 5 milliGRAM(s) Oral at bedtime  pantoprazole    Tablet 40 milliGRAM(s) Oral before breakfast  phenylephrine    Infusion 0.1 MICROgram(s)/kG/Min (3.21 mL/Hr) IV Continuous <Continuous>  piperacillin/tazobactam IVPB.. 3.375 Gram(s) IV Intermittent every 8 hours  polyethylene glycol 3350 17 Gram(s) Oral daily  potassium phosphate IVPB 15 milliMole(s) IV Intermittent once  senna 2 Tablet(s) Oral at bedtime    MEDICATIONS  (PRN):  acetaminophen   Tablet .. 650 milliGRAM(s) Oral every 6 hours PRN Temp greater or equal to 38C (100.4F), Mild Pain (1 - 3)  acetaminophen   Tablet .. 650 milliGRAM(s) Oral every 6 hours PRN Mild Pain (1 - 3)  morphine  - Injectable 1 milliGRAM(s) IV Push every 4 hours PRN prone      Allergies    No Known Allergies    Intolerances        LABS:                        12.3   13.29 )-----------( 413      ( 14 Dec 2020 06:10 )             39.0     12-14    135  |  101  |  13  ----------------------------<  112<H>  4.2   |  29  |  0.58    Ca    10.0      14 Dec 2020 06:10  Phos  2.2     12-14  Mg     2.4     12-14    TPro  7.0  /  Alb  1.9<L>  /  TBili  0.7  /  DBili  x   /  AST  131<H>  /  ALT  177<H>  /  AlkPhos  306<H>  12-14        ABG - ( 14 Dec 2020 04:08 )  pH, Arterial: 7.42  pH, Blood: x     /  pCO2: 49    /  pO2: 58    / HCO3: 31    / Base Excess: 6.1   /  SaO2: 90                CARDIAC MARKERS ( 13 Dec 2020 06:46 )  <0.015 ng/mL / x     / 19 U/L / x     / <1.0 ng/mL      CAPILLARY BLOOD GLUCOSE      POCT Blood Glucose.: 117 mg/dL (14 Dec 2020 06:19)  POCT Blood Glucose.: 112 mg/dL (13 Dec 2020 21:35)  POCT Blood Glucose.: 120 mg/dL (13 Dec 2020 16:36)  POCT Blood Glucose.: 139 mg/dL (13 Dec 2020 12:51)    pro-bnp -- 12-14 @ 06:10     d-dimer 624  12-14 @ 06:10  pro-bnp -- 12-13 @ 06:46     d-dimer 468  12-13 @ 06:46      RADIOLOGY & ADDITIONAL TESTS:    CXR:    Ct scan chest:    ekg;    echo:

## 2020-12-14 NOTE — CONSULT NOTE ADULT - CONVERSATION DETAILS
12/14/20: Spoke with patient's daughter/Sandra on the phone; ICU resident and PC SW present. Aware patient previously completed HCP form dated 4/6/16 identifying daughter/Sandra as primary HCP. Discussed status. Daughter acknowledges patient's previously stated wishes of DNR / DNI status. 12/14/20: Spoke with patient's daughter/Sandra on the phone; ICU resident and PC SW present. Aware patient previously completed HCP form dated 4/6/16 identifying daughter/Sandra as primary HCP. Discussed status. Daughter states "whole family" had COVID except for her and patient's mother past away from COVID earlier this year. Discussed risks vs benefits of resuscitation, intubation, central line, artificial nutrition. Daughter acknowledges patient's previously stated wishes of DNR / DNI status and states, "Her last wishes were DNR / DNI so unless she confirms otherwise, I have to go with that."  Daughter appears realistic, acknowledges risk of mortality. Daughter agreeable for comfort medications including morphine and ativan for sx management.  services offered and accepted. New MOLST completed as per HCP's wishes: DNR / DNI; trial IV fluids / use abx/ ok with central line and pressor support.

## 2020-12-14 NOTE — CONSULT NOTE ADULT - SUBJECTIVE AND OBJECTIVE BOX
HPI:  61 yo F from home lives with son (who is autistic, covid +) PMHx of hypothyroid, sleep apnea, PUD, cervical OA PSH tracheal resection and reconstruction, uterous myoma, HTN,  presents to the ED c/o shortness of breath x 2 days. Patient underwent sleep studyfrom 11/20 to 11/21 ,diagnosed with DARRELL, spiked fever the day after 101.6 associated with headache, and later on cough and diarrhea and tested positive for COVID on 111/23rd. pt has been having fever and diarrhea since Dx. Pt notes onset of  worsening shortness of breath  for past 2 days.  Patient denies vomiting, rash , joint pain or any other acute complaints. Pt started ABx likley amoxicillin (does not remember te name ) and Decadron 6mg X5 days.      To note, patient reported hx of massive GIB resulted in emergency intubation x 5 days, resulting in trachea damage. s/p tracheal resection and reconstruction April 2016. She has undergone multiple surveillance bronchoscopies. Patient completed own MOLST indicating DNR / DNI status.      Brief hospital course:  Patient admitted to ICU for respiratory failure 2/2 COVID PNA. Destaurated on HFNC, remains on bipap. s/p remdesivir and decadron. Started on pressor this morning. Patient completed own MOLST indicating DNR / DNI status - form on file. Per report, patient's daughter called yesterday stating patient was agreeable for intubation if "absolutely necessary" but patient did not confirm those to be her wishes. On precedex now. LACE 11.    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  Gastric ulcer  four endoscopies for diagnosis of gastric ulcer in February 2016    SOCIAL HISTORY:    Admitted from:  home  Substance abuse history:              Tobacco hx:                  Alcohol hx:              Home Opioid hx:  Spiritism: Jew                                   Preferred Language: Welsh    Surrogate/HCP/Guardian:  Sandra Stevenson (daughter): [phone]    FAMILY HISTORY:  Family history of MI (myocardial infarction) (Father, Sibling)      Baseline ADLs (prior to admission):    No Known Allergies    Present Symptoms:   Review of Systems: Unable to obtain due to poor mentation    MEDICATIONS  (STANDING):  ALBUTerol    90 MICROgram(s) HFA Inhaler 2 Puff(s) Inhalation every 6 hours  ALBUTerol    90 MICROgram(s) HFA Inhaler 1 Puff(s) Inhalation every 4 hours  chlorhexidine 2% Cloths 1 Application(s) Topical <User Schedule>  dexMEDEtomidine Infusion 0.2 MICROgram(s)/kG/Hr (4.29 mL/Hr) IV Continuous <Continuous>  dextrose 5%. 1000 milliLiter(s) (100 mL/Hr) IV Continuous <Continuous>  dextrose 50% Injectable 25 Gram(s) IV Push once  dextrose 50% Injectable 12.5 Gram(s) IV Push once  enoxaparin Injectable 40 milliGRAM(s) SubCutaneous every 12 hours  glucagon  Injectable 1 milliGRAM(s) IntraMuscular once  insulin lispro (ADMELOG) corrective regimen sliding scale   SubCutaneous three times a day before meals  levothyroxine 100 MICROGram(s) Oral daily  melatonin 5 milliGRAM(s) Oral at bedtime  pantoprazole    Tablet 40 milliGRAM(s) Oral before breakfast  phenylephrine    Infusion 0.1 MICROgram(s)/kG/Min (3.21 mL/Hr) IV Continuous <Continuous>  piperacillin/tazobactam IVPB.. 3.375 Gram(s) IV Intermittent every 8 hours  polyethylene glycol 3350 17 Gram(s) Oral daily  senna 2 Tablet(s) Oral at bedtime    MEDICATIONS  (PRN):  acetaminophen   Tablet .. 650 milliGRAM(s) Oral every 6 hours PRN Temp greater or equal to 38C (100.4F), Mild Pain (1 - 3)  acetaminophen   Tablet .. 650 milliGRAM(s) Oral every 6 hours PRN Mild Pain (1 - 3)  morphine  - Injectable 1 milliGRAM(s) IV Push every 4 hours PRN prone      PHYSICAL EXAM:    Vital Signs Last 24 Hrs  T(C): 37.4 (14 Dec 2020 08:00), Max: 38.1 (13 Dec 2020 16:00)  T(F): 99.4 (14 Dec 2020 08:00), Max: 100.6 (13 Dec 2020 16:00)  HR: 73 (14 Dec 2020 11:00) (68 - 101)  BP: 101/68 (14 Dec 2020 11:00) (76/54 - 142/88)  BP(mean): 76 (14 Dec 2020 11:00) (54 - 101)  RR: 41 (14 Dec 2020 11:00) (22 - 44)  SpO2: 82% (14 Dec 2020 11:00) (80% - 95%)    General: Patient not medically stable for full physical exam. Patient on sedation, bipap, + tachypnea.   Karnofsky Performance Score/Palliative Performance Status Version2:     %    HEENT: normal  dry mouth  ET tube/trach oral lesions:  Lungs: tachypnea/labored breathing,   CV: on pressor  GI:  incontinent               PEG/NG/OG tube  constipation  last BM:   : normal  incontinent  oliguria/anuria  valencia  Musculoskeletal: normal  weakness  edema             ambulatory  bedbound/wheelchair bound  Skin: normal  pressure ulcers: stage: edema: other:  Neuro: no deficits cognitive impairment dsyphagia/dysarthria paresis: other:  Oral intake ability: unable/only mouth care [minimal moderate full capability]  Diet: [NPO]    LABS:                        12.3   13.29 )-----------( 413      ( 14 Dec 2020 06:10 )             39.0     12-14    135  |  101  |  13  ----------------------------<  112<H>  4.2   |  29  |  0.58    Ca    10.0      14 Dec 2020 06:10  Phos  2.2     12-14  Mg     2.4     12-14    TPro  7.0  /  Alb  1.9<L>  /  TBili  0.7  /  DBili  x   /  AST  131<H>  /  ALT  177<H>  /  AlkPhos  306<H>  12-14        RADIOLOGY & ADDITIONAL STUDIES:    ADVANCE DIRECTIVES:   Advanced Care Planning discussion total time spent: HPI:  61 yo F from home lives with son (who is autistic, covid +) PMHx of hypothyroid, sleep apnea, PUD, cervical OA PSH tracheal resection and reconstruction, uterous myoma, HTN,  presents to the ED c/o shortness of breath x 2 days. Patient underwent sleep studyfrom 11/20 to 11/21 ,diagnosed with DARRELL, spiked fever the day after 101.6 associated with headache, and later on cough and diarrhea and tested positive for COVID on 111/23rd. pt has been having fever and diarrhea since Dx. Pt notes onset of  worsening shortness of breath  for past 2 days.  Patient denies vomiting, rash , joint pain or any other acute complaints. Pt started ABx likley amoxicillin (does not remember te name ) and Decadron 6mg X5 days.      To note, patient reported hx of massive GIB resulted in emergency intubation x 5 days, resulting in trachea damage. s/p tracheal resection and reconstruction April 2016. She has undergone multiple surveillance bronchoscopies.       Brief hospital course:  Patient admitted to ICU for respiratory failure 2/2 COVID PNA. Destaurated on HFNC, remains on bipap. s/p remdesivir and decadron. Started on pressor this morning; continues precedex. MOLST on file from both patient and daughter indicating DNR / DNI status; however, per report - patient's daughter now stating patient is agreeable with intubation "if absolutely necessary" however, medical team unable to confirm with patient due to sedation. High risk of intubation. LACE 11.    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  Gastric ulcer  four endoscopies for diagnosis of gastric ulcer in February 2016    SOCIAL HISTORY:    Admitted from:  home  Substance abuse history/ Tobacco hx /Alcohol hx:    Unable to obtain 2/2 mental status          Home Opioid hx: none  Pentecostalism: Shinto                                   Preferred Language: Kazakh    Surrogate/HCP/Guardian:  Sandra Stevenson (HCP/daughter): [phone]    FAMILY HISTORY:  Family history of MI (myocardial infarction) (Father, Sibling)      Baseline ADLs (prior to admission): Per report, patient A&O, ambulatory, independent of ADLs    No Known Allergies    Present Symptoms:   Review of Systems: Unable to obtain due to poor mentation    MEDICATIONS  (STANDING):  ALBUTerol    90 MICROgram(s) HFA Inhaler 2 Puff(s) Inhalation every 6 hours  ALBUTerol    90 MICROgram(s) HFA Inhaler 1 Puff(s) Inhalation every 4 hours  chlorhexidine 2% Cloths 1 Application(s) Topical <User Schedule>  dexMEDEtomidine Infusion 0.2 MICROgram(s)/kG/Hr (4.29 mL/Hr) IV Continuous <Continuous>  dextrose 5%. 1000 milliLiter(s) (100 mL/Hr) IV Continuous <Continuous>  dextrose 50% Injectable 25 Gram(s) IV Push once  dextrose 50% Injectable 12.5 Gram(s) IV Push once  enoxaparin Injectable 40 milliGRAM(s) SubCutaneous every 12 hours  glucagon  Injectable 1 milliGRAM(s) IntraMuscular once  insulin lispro (ADMELOG) corrective regimen sliding scale   SubCutaneous three times a day before meals  levothyroxine 100 MICROGram(s) Oral daily  melatonin 5 milliGRAM(s) Oral at bedtime  pantoprazole    Tablet 40 milliGRAM(s) Oral before breakfast  phenylephrine    Infusion 0.1 MICROgram(s)/kG/Min (3.21 mL/Hr) IV Continuous <Continuous>  piperacillin/tazobactam IVPB.. 3.375 Gram(s) IV Intermittent every 8 hours  polyethylene glycol 3350 17 Gram(s) Oral daily  senna 2 Tablet(s) Oral at bedtime    MEDICATIONS  (PRN):  acetaminophen   Tablet .. 650 milliGRAM(s) Oral every 6 hours PRN Temp greater or equal to 38C (100.4F), Mild Pain (1 - 3)  acetaminophen   Tablet .. 650 milliGRAM(s) Oral every 6 hours PRN Mild Pain (1 - 3)  morphine  - Injectable 1 milliGRAM(s) IV Push every 4 hours PRN prone      PHYSICAL EXAM:    Vital Signs Last 24 Hrs  T(C): 37.4 (14 Dec 2020 08:00), Max: 38.1 (13 Dec 2020 16:00)  T(F): 99.4 (14 Dec 2020 08:00), Max: 100.6 (13 Dec 2020 16:00)  HR: 73 (14 Dec 2020 11:00) (68 - 101)  BP: 101/68 (14 Dec 2020 11:00) (76/54 - 142/88)  BP(mean): 76 (14 Dec 2020 11:00) (54 - 101)  RR: 41 (14 Dec 2020 11:00) (22 - 44)  SpO2: 82% (14 Dec 2020 11:00) (80% - 95%)    General: Patient not medically stable for full physical exam. Patient on sedation, bipap, + tachypnea.   Karnofsky Performance Score/Palliative Performance Status Version2:     20%    HEENT: patient not medically stable to assess  Lungs: tachypnea/labored breathing, on bipap.   CV: on pressor  GI:  incontinent  : primafit  Musculoskeletal: patient on sedation - dependent on ADLs     Skin: patient not medically stable to assess  Neuro: on precedex. patient not medically stable to assess  Oral intake ability: unable/only mouth care    Diet: NPO    LABS:                        12.3   13.29 )-----------( 413      ( 14 Dec 2020 06:10 )             39.0     12-14    135  |  101  |  13  ----------------------------<  112<H>  4.2   |  29  |  0.58    Ca    10.0      14 Dec 2020 06:10  Phos  2.2     12-14  Mg     2.4     12-14    TPro  7.0  /  Alb  1.9<L>  /  TBili  0.7  /  DBili  x   /  AST  131<H>  /  ALT  177<H>  /  AlkPhos  306<H>  12-14        RADIOLOGY & ADDITIONAL STUDIES:  < from: Xray Chest 1 View- PORTABLE-Routine (Xray Chest 1 View- PORTABLE-Routine in AM.) (12.12.20 @ 10:32) >  EXAM:  XR CHEST PORTABLE ROUTINE 1V                        PROCEDURE DATE:  12/12/2020    INTERPRETATION:  Portable chest radiograph  CLINICAL INFORMATION: Intubation. Follow-up  TECHNIQUE:  Portable  AP view of the chest was obtained.  COMPARISON: No previous examinations are available for review.  FINDINGS:  The lungs show unchanged scattered and diffuse multifocal ill-defined airspace consolidations  No pneumothorax.  The heart and mediastinum are within normal limits.  Visualized osseous structures are intact.    IMPRESSION:   No interval change..  < end of copied text >      ADVANCE DIRECTIVES: Previously completed HCP and MOLST forms  on file.    HPI:  61 yo F from home lives with son (who is autistic, covid +) PMHx of hypothyroid, sleep apnea, PUD, cervical OA PSH tracheal resection and reconstruction, uterous myoma, HTN,  presents to the ED c/o shortness of breath x 2 days. Patient underwent sleep studyfrom 11/20 to 11/21 ,diagnosed with DARRELL, spiked fever the day after 101.6 associated with headache, and later on cough and diarrhea and tested positive for COVID on 111/23rd. pt has been having fever and diarrhea since Dx. Pt notes onset of  worsening shortness of breath  for past 2 days.  Patient denies vomiting, rash , joint pain or any other acute complaints. Pt started ABx likley amoxicillin (does not remember te name ) and Decadron 6mg X5 days.      To note, patient reported hx of massive GIB resulted in emergency intubation x 5 days, resulting in trachea damage. s/p tracheal resection and reconstruction April 2016. She has undergone multiple surveillance bronchoscopies.       Brief hospital course:  Patient admitted to ICU for respiratory failure 2/2 COVID PNA. Destaurated on HFNC, remains on bipap. s/p remdesivir and decadron. Started on pressor this morning; continues precedex. MOLST on file from both patient and daughter indicating DNR / DNI status; however, per report - patient's daughter now stating patient is agreeable with intubation "if absolutely necessary" however, medical team unable to confirm with patient due to sedation. High risk of intubation. LACE 11.    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  Gastric ulcer  four endoscopies for diagnosis of gastric ulcer in February 2016    SOCIAL HISTORY:    Admitted from:  home  Substance abuse history/ Tobacco hx /Alcohol hx:    Unable to obtain 2/2 mental status          Home Opioid hx: none  Christianity: Sabianist                                   Preferred Language: Yoruba    Surrogate/HCP/Guardian:  Sandra Stevenson (HCP/daughter): [phone]    FAMILY HISTORY:  Family history of MI (myocardial infarction) (Father, Sibling)      Baseline ADLs (prior to admission): Per report, patient A&O, ambulatory, independent of ADLs    No Known Allergies    Present Symptoms:   Review of Systems: Unable to obtain due to poor mentation    MEDICATIONS  (STANDING):  ALBUTerol    90 MICROgram(s) HFA Inhaler 2 Puff(s) Inhalation every 6 hours  ALBUTerol    90 MICROgram(s) HFA Inhaler 1 Puff(s) Inhalation every 4 hours  chlorhexidine 2% Cloths 1 Application(s) Topical <User Schedule>  dexMEDEtomidine Infusion 0.2 MICROgram(s)/kG/Hr (4.29 mL/Hr) IV Continuous <Continuous>  dextrose 5%. 1000 milliLiter(s) (100 mL/Hr) IV Continuous <Continuous>  dextrose 50% Injectable 25 Gram(s) IV Push once  dextrose 50% Injectable 12.5 Gram(s) IV Push once  enoxaparin Injectable 40 milliGRAM(s) SubCutaneous every 12 hours  glucagon  Injectable 1 milliGRAM(s) IntraMuscular once  insulin lispro (ADMELOG) corrective regimen sliding scale   SubCutaneous three times a day before meals  levothyroxine 100 MICROGram(s) Oral daily  melatonin 5 milliGRAM(s) Oral at bedtime  pantoprazole    Tablet 40 milliGRAM(s) Oral before breakfast  phenylephrine    Infusion 0.1 MICROgram(s)/kG/Min (3.21 mL/Hr) IV Continuous <Continuous>  piperacillin/tazobactam IVPB.. 3.375 Gram(s) IV Intermittent every 8 hours  polyethylene glycol 3350 17 Gram(s) Oral daily  senna 2 Tablet(s) Oral at bedtime    MEDICATIONS  (PRN):  acetaminophen   Tablet .. 650 milliGRAM(s) Oral every 6 hours PRN Temp greater or equal to 38C (100.4F), Mild Pain (1 - 3)  acetaminophen   Tablet .. 650 milliGRAM(s) Oral every 6 hours PRN Mild Pain (1 - 3)  morphine  - Injectable 1 milliGRAM(s) IV Push every 4 hours PRN prone      PHYSICAL EXAM:    Vital Signs Last 24 Hrs  T(C): 37.4 (14 Dec 2020 08:00), Max: 38.1 (13 Dec 2020 16:00)  T(F): 99.4 (14 Dec 2020 08:00), Max: 100.6 (13 Dec 2020 16:00)  HR: 73 (14 Dec 2020 11:00) (68 - 101)  BP: 101/68 (14 Dec 2020 11:00) (76/54 - 142/88)  BP(mean): 76 (14 Dec 2020 11:00) (54 - 101)  RR: 41 (14 Dec 2020 11:00) (22 - 44)  SpO2: 82% (14 Dec 2020 11:00) (80% - 95%)    General: Patient not medically stable for full physical exam. Patient on sedation, bipap, + tachypnea.   Karnofsky Performance Score/Palliative Performance Status Version2:     20%    HEENT: patient not medically stable to assess  Lungs: tachypnea/labored breathing, on bipap.   CV: on pressor  GI:  incontinent  : primafit  Musculoskeletal: patient on sedation - dependent on ADLs     Skin: patient not medically stable to assess  Neuro: on precedex. patient not medically stable to assess  Oral intake ability: unable/only mouth care    Diet: NPO    LABS:                        12.3   13.29 )-----------( 413      ( 14 Dec 2020 06:10 )             39.0     12-14    135  |  101  |  13  ----------------------------<  112<H>  4.2   |  29  |  0.58    Ca    10.0      14 Dec 2020 06:10  Phos  2.2     12-14  Mg     2.4     12-14    TPro  7.0  /  Alb  1.9<L>  /  TBili  0.7  /  DBili  x   /  AST  131<H>  /  ALT  177<H>  /  AlkPhos  306<H>  12-14        RADIOLOGY & ADDITIONAL STUDIES:  < from: Xray Chest 1 View- PORTABLE-Routine (Xray Chest 1 View- PORTABLE-Routine in AM.) (12.12.20 @ 10:32) >  EXAM:  XR CHEST PORTABLE ROUTINE 1V                        PROCEDURE DATE:  12/12/2020    INTERPRETATION:  Portable chest radiograph  CLINICAL INFORMATION: Intubation. Follow-up  TECHNIQUE:  Portable  AP view of the chest was obtained.  COMPARISON: No previous examinations are available for review.  FINDINGS:  The lungs show unchanged scattered and diffuse multifocal ill-defined airspace consolidations  No pneumothorax.  The heart and mediastinum are within normal limits.  Visualized osseous structures are intact.    IMPRESSION:   No interval change..  < end of copied text >      ADVANCE DIRECTIVES: Previously completed HCP and MOLST forms  on file. New MOLT completed: DNR / DNI / trial IV fluids / use abx / OK with central line and pressors.

## 2020-12-14 NOTE — PROCEDURE NOTE - NSPROCDETAILS_GEN_ALL_CORE
lumen(s) aspirated and flushed/sterile dressing applied/sterile technique, catheter placed/ultrasound guidance with use of sterile gel and probe cove/guidewire recovered

## 2020-12-14 NOTE — CONSULT NOTE ADULT - REASON FOR ADMISSION
COVID PNA requiring oxygen supplementation

## 2020-12-14 NOTE — CONSULT NOTE ADULT - CONVERSATION/DISCUSSION
Prognosis/MOLST Discussed/Diagnosis MOLST Discussed/Treatment Options/Prognosis/Chaplaincy Referral/Diagnosis

## 2020-12-14 NOTE — GOALS OF CARE CONVERSATION - ADVANCED CARE PLANNING - CONVERSATION DETAILS
LUISITO Pascal participated in a conversation with the patient's daughter/HCP Sandra Stevenson [phone], PC NP and resident physician.  Goals of care were re-addressed.  Patient's daughter stated her goal is to honor her mother's wishes which were DNR/DNI. New MOLST was drafted to reflect these wishes.  Emotional support was provided.  Ms. Stevenson stated her family has been battling Covid 19 and they lost their maternal grandmother in the Spring to the virus.  Patient has a 19 y.o. son with special needs whom Ms. Stevenson is currently caring for.  PC Sw stated she will remain available to assist with support and resources.  In regard to social history, the patient is , identifies with the Muslim tomasz and was employed with the [employer].  Ms. Stevenson stated her  and family have been supportive and she verbalized that her mother's condition is tenuous.  No addl. needs were expressed at this time. Sw will remain available and has provided her contact information.      Addendum:  PC NP and PC SW contacted patient's daughter Ms. Stevenson to provide an update.  NP stated the patient may require a morphine drip for increased work of breathing and discomfort.  Ms. Stevenson verbalized understanding and stated she and her brother were provided Facetime with their mother.  Patient had shared concern over the weekend with her family that she would have the same medical outcome as her mother secondary to Covid.  Sandra stated, despite her grief " I am spiritual and know God's will will be done."

## 2020-12-14 NOTE — PROGRESS NOTE ADULT - SUBJECTIVE AND OBJECTIVE BOX
INTERVAL HPI/OVERNIGHT EVENTS: Patient was desaturating and was noted to be more anxious. O2 sats are in 80s on Bipap  PRESSORS: [x ] YES [ ] NO  WHICH: Pheny     ANTIBIOTICS: zosyn      DATE STARTED: 12/13    Antimicrobial:  piperacillin/tazobactam IVPB.. 3.375 Gram(s) IV Intermittent every 8 hours    Cardiovascular:  phenylephrine    Infusion 0.1 MICROgram(s)/kG/Min IV Continuous <Continuous>    Pulmonary:  ALBUTerol    90 MICROgram(s) HFA Inhaler 1 Puff(s) Inhalation every 4 hours  ALBUTerol    90 MICROgram(s) HFA Inhaler 2 Puff(s) Inhalation every 6 hours    Hematalogic:  enoxaparin Injectable 40 milliGRAM(s) SubCutaneous every 12 hours    Other:  acetaminophen   Tablet .. 650 milliGRAM(s) Oral every 6 hours PRN  acetaminophen   Tablet .. 650 milliGRAM(s) Oral every 6 hours PRN  chlorhexidine 2% Cloths 1 Application(s) Topical <User Schedule>  dexMEDEtomidine Infusion 0.2 MICROgram(s)/kG/Hr IV Continuous <Continuous>  dextrose 5%. 1000 milliLiter(s) IV Continuous <Continuous>  dextrose 50% Injectable 25 Gram(s) IV Push once  dextrose 50% Injectable 12.5 Gram(s) IV Push once  glucagon  Injectable 1 milliGRAM(s) IntraMuscular once  insulin lispro (ADMELOG) corrective regimen sliding scale   SubCutaneous three times a day before meals  levothyroxine 100 MICROGram(s) Oral daily  melatonin 5 milliGRAM(s) Oral at bedtime  morphine  - Injectable 1 milliGRAM(s) IV Push every 4 hours PRN  pantoprazole    Tablet 40 milliGRAM(s) Oral before breakfast  polyethylene glycol 3350 17 Gram(s) Oral daily  potassium phosphate IVPB 15 milliMole(s) IV Intermittent once  senna 2 Tablet(s) Oral at bedtime    acetaminophen   Tablet .. 650 milliGRAM(s) Oral every 6 hours PRN  acetaminophen   Tablet .. 650 milliGRAM(s) Oral every 6 hours PRN  ALBUTerol    90 MICROgram(s) HFA Inhaler 1 Puff(s) Inhalation every 4 hours  ALBUTerol    90 MICROgram(s) HFA Inhaler 2 Puff(s) Inhalation every 6 hours  chlorhexidine 2% Cloths 1 Application(s) Topical <User Schedule>  dexMEDEtomidine Infusion 0.2 MICROgram(s)/kG/Hr IV Continuous <Continuous>  dextrose 5%. 1000 milliLiter(s) IV Continuous <Continuous>  dextrose 50% Injectable 25 Gram(s) IV Push once  dextrose 50% Injectable 12.5 Gram(s) IV Push once  enoxaparin Injectable 40 milliGRAM(s) SubCutaneous every 12 hours  glucagon  Injectable 1 milliGRAM(s) IntraMuscular once  insulin lispro (ADMELOG) corrective regimen sliding scale   SubCutaneous three times a day before meals  levothyroxine 100 MICROGram(s) Oral daily  melatonin 5 milliGRAM(s) Oral at bedtime  morphine  - Injectable 1 milliGRAM(s) IV Push every 4 hours PRN  pantoprazole    Tablet 40 milliGRAM(s) Oral before breakfast  phenylephrine    Infusion 0.1 MICROgram(s)/kG/Min IV Continuous <Continuous>  piperacillin/tazobactam IVPB.. 3.375 Gram(s) IV Intermittent every 8 hours  polyethylene glycol 3350 17 Gram(s) Oral daily  potassium phosphate IVPB 15 milliMole(s) IV Intermittent once  senna 2 Tablet(s) Oral at bedtime    Drug Dosing Weight  Height (cm): 162.6 (01 Dec 2020 09:53)  Weight (kg): 85.7 (01 Dec 2020 09:53)  BMI (kg/m2): 32.4 (01 Dec 2020 09:53)  BSA (m2): 1.91 (01 Dec 2020 09:53)    CENTRAL LINE: [ ] YES [ ] NO  LOCATION:   DATE INSERTED:    DONALD: [ ] YES [ ] NO    DATE INSERTED:    A-LINE:  [ ] YES [ ] NO  LOCATION:   DATE INSERTED:    ICU Vital Signs Last 24 Hrs  T(C): 37.4 (14 Dec 2020 08:00), Max: 38.1 (13 Dec 2020 16:00)  T(F): 99.4 (14 Dec 2020 08:00), Max: 100.6 (13 Dec 2020 16:00)  HR: 73 (14 Dec 2020 11:00) (68 - 101)  BP: 101/68 (14 Dec 2020 11:00) (76/54 - 142/88)  BP(mean): 76 (14 Dec 2020 11:00) (54 - 101)  ABP: --  ABP(mean): --  RR: 41 (14 Dec 2020 11:00) (22 - 44)  SpO2: 82% (14 Dec 2020 11:00) (80% - 95%)      ABG - ( 14 Dec 2020 04:08 )  pH, Arterial: 7.42  pH, Blood: x     /  pCO2: 49    /  pO2: 58    / HCO3: 31    / Base Excess: 6.1   /  SaO2: 90                    12-13 @ 07:01  -  12-14 @ 07:00  --------------------------------------------------------  IN: 25.5 mL / OUT: 1200 mL / NET: -1174.5 mL            REVIEW OF SYSTEMS:  unable to obtain as pt is sedated     PHYSICAL EXAM:    >>> <<<    LABS:  CBC Full  -  ( 14 Dec 2020 06:10 )  WBC Count : 13.29 K/uL  RBC Count : 3.96 M/uL  Hemoglobin : 12.3 g/dL  Hematocrit : 39.0 %  Platelet Count - Automated : 413 K/uL  Mean Cell Volume : 98.5 fl  Mean Cell Hemoglobin : 31.1 pg  Mean Cell Hemoglobin Concentration : 31.5 gm/dL  Auto Neutrophil # : x  Auto Lymphocyte # : x  Auto Monocyte # : x  Auto Eosinophil # : x  Auto Basophil # : x  Auto Neutrophil % : x  Auto Lymphocyte % : x  Auto Monocyte % : x  Auto Eosinophil % : x  Auto Basophil % : x    12-14    135  |  101  |  13  ----------------------------<  112<H>  4.2   |  29  |  0.58    Ca    10.0      14 Dec 2020 06:10  Phos  2.2     12-14  Mg     2.4     12-14    TPro  7.0  /  Alb  1.9<L>  /  TBili  0.7  /  DBili  x   /  AST  131<H>  /  ALT  177<H>  /  AlkPhos  306<H>  12-14            RADIOLOGY & ADDITIONAL STUDIES REVIEWED:  ***    [ ]GOALS OF CARE DISCUSSION WITH PATIENT/FAMILY/PROXY:    CRITICAL CARE TIME SPENT: 35 minutes INTERVAL HPI/OVERNIGHT EVENTS: Patient was desaturating and was noted to be more anxious. O2 sats are in 80s on Bipap  PRESSORS: [x ] YES [ ] NO  WHICH: Pheny     ANTIBIOTICS: zosyn      DATE STARTED: 12/13    Antimicrobial:  piperacillin/tazobactam IVPB.. 3.375 Gram(s) IV Intermittent every 8 hours    Cardiovascular:  phenylephrine    Infusion 0.1 MICROgram(s)/kG/Min IV Continuous <Continuous>    Pulmonary:  ALBUTerol    90 MICROgram(s) HFA Inhaler 1 Puff(s) Inhalation every 4 hours  ALBUTerol    90 MICROgram(s) HFA Inhaler 2 Puff(s) Inhalation every 6 hours    Hematalogic:  enoxaparin Injectable 40 milliGRAM(s) SubCutaneous every 12 hours    Other:  acetaminophen   Tablet .. 650 milliGRAM(s) Oral every 6 hours PRN  acetaminophen   Tablet .. 650 milliGRAM(s) Oral every 6 hours PRN  chlorhexidine 2% Cloths 1 Application(s) Topical <User Schedule>  dexMEDEtomidine Infusion 0.2 MICROgram(s)/kG/Hr IV Continuous <Continuous>  dextrose 5%. 1000 milliLiter(s) IV Continuous <Continuous>  dextrose 50% Injectable 25 Gram(s) IV Push once  dextrose 50% Injectable 12.5 Gram(s) IV Push once  glucagon  Injectable 1 milliGRAM(s) IntraMuscular once  insulin lispro (ADMELOG) corrective regimen sliding scale   SubCutaneous three times a day before meals  levothyroxine 100 MICROGram(s) Oral daily  melatonin 5 milliGRAM(s) Oral at bedtime  morphine  - Injectable 1 milliGRAM(s) IV Push every 4 hours PRN  pantoprazole    Tablet 40 milliGRAM(s) Oral before breakfast  polyethylene glycol 3350 17 Gram(s) Oral daily  potassium phosphate IVPB 15 milliMole(s) IV Intermittent once  senna 2 Tablet(s) Oral at bedtime    acetaminophen   Tablet .. 650 milliGRAM(s) Oral every 6 hours PRN  acetaminophen   Tablet .. 650 milliGRAM(s) Oral every 6 hours PRN  ALBUTerol    90 MICROgram(s) HFA Inhaler 1 Puff(s) Inhalation every 4 hours  ALBUTerol    90 MICROgram(s) HFA Inhaler 2 Puff(s) Inhalation every 6 hours  chlorhexidine 2% Cloths 1 Application(s) Topical <User Schedule>  dexMEDEtomidine Infusion 0.2 MICROgram(s)/kG/Hr IV Continuous <Continuous>  dextrose 5%. 1000 milliLiter(s) IV Continuous <Continuous>  dextrose 50% Injectable 25 Gram(s) IV Push once  dextrose 50% Injectable 12.5 Gram(s) IV Push once  enoxaparin Injectable 40 milliGRAM(s) SubCutaneous every 12 hours  glucagon  Injectable 1 milliGRAM(s) IntraMuscular once  insulin lispro (ADMELOG) corrective regimen sliding scale   SubCutaneous three times a day before meals  levothyroxine 100 MICROGram(s) Oral daily  melatonin 5 milliGRAM(s) Oral at bedtime  morphine  - Injectable 1 milliGRAM(s) IV Push every 4 hours PRN  pantoprazole    Tablet 40 milliGRAM(s) Oral before breakfast  phenylephrine    Infusion 0.1 MICROgram(s)/kG/Min IV Continuous <Continuous>  piperacillin/tazobactam IVPB.. 3.375 Gram(s) IV Intermittent every 8 hours  polyethylene glycol 3350 17 Gram(s) Oral daily  potassium phosphate IVPB 15 milliMole(s) IV Intermittent once  senna 2 Tablet(s) Oral at bedtime    Drug Dosing Weight  Height (cm): 162.6 (01 Dec 2020 09:53)  Weight (kg): 85.7 (01 Dec 2020 09:53)  BMI (kg/m2): 32.4 (01 Dec 2020 09:53)  BSA (m2): 1.91 (01 Dec 2020 09:53)    CENTRAL LINE: [a ] YES [ ] NO  LOCATION: [facility] DATE INSERTED: 12/14    DONALD: [X ] YES [ ] NO    DATE INSERTED:    A-LINE:  [ ] YES [ X] NO  LOCATION:   DATE INSERTED:    ICU Vital Signs Last 24 Hrs  T(C): 37.4 (14 Dec 2020 08:00), Max: 38.1 (13 Dec 2020 16:00)  T(F): 99.4 (14 Dec 2020 08:00), Max: 100.6 (13 Dec 2020 16:00)  HR: 73 (14 Dec 2020 11:00) (68 - 101)  BP: 101/68 (14 Dec 2020 11:00) (76/54 - 142/88)  BP(mean): 76 (14 Dec 2020 11:00) (54 - 101)  ABP: --  ABP(mean): --  RR: 41 (14 Dec 2020 11:00) (22 - 44)  SpO2: 82% (14 Dec 2020 11:00) (80% - 95%)      ABG - ( 14 Dec 2020 04:08 )  pH, Arterial: 7.42  pH, Blood: x     /  pCO2: 49    /  pO2: 58    / HCO3: 31    / Base Excess: 6.1   /  SaO2: 90                    12-13 @ 07:01  -  12-14 @ 07:00  --------------------------------------------------------  IN: 25.5 mL / OUT: 1200 mL / NET: -1174.5 mL            REVIEW OF SYSTEMS:  unable to obtain as pt is sedated     PHYSICAL EXAM:    GENERAL: respiratory distress   HEAD:  Atraumatic, Normocephalic  EYES: EOMI, PERRLA, conjunctiva and sclera clear  ENT: Moist mucous membranes  NECK: Supple, No JVD  CHEST/LUNG: Clear to auscultation bilaterally; No rales, rhonchi, wheezing, or rubs.  HEART: Regular rate and rhythm; S1+ S2+  ABDOMEN: Bowel sounds present; Soft, Nontender, Nondistended.  EXTREMITIES:  2+ Peripheral Pulses, brisk capillary refill. No clubbing, cyanosis, or edema  NERVOUS SYSTEM:  Sedated  responsive to pain  MSK: FROM all 4 extremities, full and equal strength  SKIN: No rashes or lesions      LABS:  CBC Full  -  ( 14 Dec 2020 06:10 )  WBC Count : 13.29 K/uL  RBC Count : 3.96 M/uL  Hemoglobin : 12.3 g/dL  Hematocrit : 39.0 %  Platelet Count - Automated : 413 K/uL  Mean Cell Volume : 98.5 fl  Mean Cell Hemoglobin : 31.1 pg  Mean Cell Hemoglobin Concentration : 31.5 gm/dL  Auto Neutrophil # : x  Auto Lymphocyte # : x  Auto Monocyte # : x  Auto Eosinophil # : x  Auto Basophil # : x  Auto Neutrophil % : x  Auto Lymphocyte % : x  Auto Monocyte % : x  Auto Eosinophil % : x  Auto Basophil % : x    12-14    135  |  101  |  13  ----------------------------<  112<H>  4.2   |  29  |  0.58    Ca    10.0      14 Dec 2020 06:10  Phos  2.2     12-14  Mg     2.4     12-14    TPro  7.0  /  Alb  1.9<L>  /  TBili  0.7  /  DBili  x   /  AST  131<H>  /  ALT  177<H>  /  AlkPhos  306<H>  12-14            RADIOLOGY & ADDITIONAL STUDIES REVIEWED:  **YES *    [ ]GOALS OF CARE DISCUSSION WITH PATIENT/FAMILY/PROXY: DNR/ DNI     CRITICAL CARE TIME SPENT: 35 minutes

## 2020-12-14 NOTE — PROGRESS NOTE ADULT - SUBJECTIVE AND OBJECTIVE BOX
Patient is a 60y old  Female who presents with a chief complaint of COVID PNA requiring oxygen supplementation (13 Dec 2020 11:49)    pt seen in icu [ x ], reg med floor [   ], bed [ x ], chair at bedside [   ], a+o x3 [ x ], lethargic [  ],    nad [x  ]      Allergies    No Known Allergies        Vitals    T(F): 99 (12-14-20 @ 04:57), Max: 100.6 (12-13-20 @ 16:00)  HR: 88 (12-14-20 @ 06:00) (77 - 101)  BP: 103/56 (12-14-20 @ 06:00) (83/62 - 142/88)  RR: 44 (12-14-20 @ 06:00) (22 - 44)  SpO2: 88% (12-14-20 @ 06:00) (80% - 95%)  Wt(kg): --  CAPILLARY BLOOD GLUCOSE      POCT Blood Glucose.: 117 mg/dL (14 Dec 2020 06:19)      Labs                          12.3   13.29 )-----------( 413      ( 14 Dec 2020 06:10 )             39.0       12-13    133<L>  |  98  |  14  ----------------------------<  179<H>  4.4   |  27  |  0.56    Ca    10.4      13 Dec 2020 06:46  Phos  3.1     12-13  Mg     2.4     12-13    TPro  6.7  /  Alb  1.9<L>  /  TBili  0.6  /  DBili  x   /  AST  77<H>  /  ALT  123<H>  /  AlkPhos  193<H>  12-13      CARDIAC MARKERS ( 13 Dec 2020 06:46 )  <0.015 ng/mL / x     / 19 U/L / x     / <1.0 ng/mL            Radiology Results      Meds    MEDICATIONS  (STANDING):  ALBUTerol    90 MICROgram(s) HFA Inhaler 2 Puff(s) Inhalation every 6 hours  ALBUTerol    90 MICROgram(s) HFA Inhaler 1 Puff(s) Inhalation every 4 hours  chlorhexidine 2% Cloths 1 Application(s) Topical <User Schedule>  dexMEDEtomidine Infusion 0.2 MICROgram(s)/kG/Hr (4.29 mL/Hr) IV Continuous <Continuous>  dextrose 5%. 1000 milliLiter(s) (100 mL/Hr) IV Continuous <Continuous>  dextrose 50% Injectable 25 Gram(s) IV Push once  dextrose 50% Injectable 12.5 Gram(s) IV Push once  enoxaparin Injectable 40 milliGRAM(s) SubCutaneous every 12 hours  glucagon  Injectable 1 milliGRAM(s) IntraMuscular once  insulin lispro (ADMELOG) corrective regimen sliding scale   SubCutaneous three times a day before meals  levothyroxine 100 MICROGram(s) Oral daily  melatonin 5 milliGRAM(s) Oral at bedtime  pantoprazole    Tablet 40 milliGRAM(s) Oral before breakfast  piperacillin/tazobactam IVPB.. 3.375 Gram(s) IV Intermittent every 8 hours  polyethylene glycol 3350 17 Gram(s) Oral daily  senna 2 Tablet(s) Oral at bedtime      MEDICATIONS  (PRN):  acetaminophen   Tablet .. 650 milliGRAM(s) Oral every 6 hours PRN Temp greater or equal to 38C (100.4F), Mild Pain (1 - 3)  acetaminophen   Tablet .. 650 milliGRAM(s) Oral every 6 hours PRN Mild Pain (1 - 3)  morphine  - Injectable 1 milliGRAM(s) IV Push every 4 hours PRN prone      Physical Exam    Neuro :  no focal deficits  Respiratory: CTA B/L  CV: RRR, S1S2, no murmurs,   Abdominal: Soft, NT, ND +BS,  Extremities: No edema, + peripheral pulses          ASSESSMENT    Infection due to severe acute respiratory syndrome coronavirus 2 (SARS-CoV-2)  pneumonia,   hypoxia,   h/o hypothyroid,   sleep apnea,   PUD,   cervical OA   tracheal resection and reconstruction,   uterus myoma,   HTN,   gi bleed  Osteoporosis  Hyperparathyroidism      PLAN    id f/u   Decadron d/c'd 12/10/20  completed Remdesivir 12/06/20  s/p convalescent plasma  cont contact and airborne isolation,   cont albuterol inhaler,   cont tylenol prn, robitussin prn   pulm f/u   O2 sat (70% - 95%) on bipap   cont bipap as needed,   pt afebrile  dvt prophylaxis  covid-19 antibody test neg,   resp viral panel positive for covid 19,   procalcitonin, D-dimer wnl noted     esr, crp, ldh, ferritin elevated noted    endo f/u  hypercalcemia due to hyperparathyroidism  high vitamin d 1-25 ? etiology-   ace lev wnl   hx of parathyroid surgery r/o pth hyperplasia  s/p one dose of iv aredia 90mg   cont to monitor calcium  keep pt hydrated   cont current meds  mgmt as per icu       Patient is a 60y old  Female who presents with a chief complaint of COVID PNA requiring oxygen supplementation (13 Dec 2020 11:49)    pt seen in icu [ x ], reg med floor [   ], bed [ x ], chair at bedside [   ], a+o x3 [ x ], lethargic [  ],    nad [x  ]      Allergies    No Known Allergies        Vitals    T(F): 99 (12-14-20 @ 04:57), Max: 100.6 (12-13-20 @ 16:00)  HR: 88 (12-14-20 @ 06:00) (77 - 101)  BP: 103/56 (12-14-20 @ 06:00) (83/62 - 142/88)  RR: 44 (12-14-20 @ 06:00) (22 - 44)  SpO2: 88% (12-14-20 @ 06:00) (80% - 95%)  Wt(kg): --  CAPILLARY BLOOD GLUCOSE      POCT Blood Glucose.: 117 mg/dL (14 Dec 2020 06:19)      Labs                          12.3   13.29 )-----------( 413      ( 14 Dec 2020 06:10 )             39.0       12-13    133<L>  |  98  |  14  ----------------------------<  179<H>  4.4   |  27  |  0.56    Ca    10.4      13 Dec 2020 06:46  Phos  3.1     12-13  Mg     2.4     12-13    TPro  6.7  /  Alb  1.9<L>  /  TBili  0.6  /  DBili  x   /  AST  77<H>  /  ALT  123<H>  /  AlkPhos  193<H>  12-13      CARDIAC MARKERS ( 13 Dec 2020 06:46 )  <0.015 ng/mL / x     / 19 U/L / x     / <1.0 ng/mL            Radiology Results      < from: Xray Chest 1 View- PORTABLE-Routine (Xray Chest 1 View- PORTABLE-Routine in AM.) (12.12.20 @ 10:32) >  FINDINGS:  The lungs show unchanged scattered and diffuse multifocal ill-defined airspace consolidations  . No pneumothorax.    The heart and mediastinum are within normal limits.    Visualized osseous structures are intact.        IMPRESSION:   No interval change..      < end of copied text >      Meds    MEDICATIONS  (STANDING):  ALBUTerol    90 MICROgram(s) HFA Inhaler 2 Puff(s) Inhalation every 6 hours  ALBUTerol    90 MICROgram(s) HFA Inhaler 1 Puff(s) Inhalation every 4 hours  chlorhexidine 2% Cloths 1 Application(s) Topical <User Schedule>  dexMEDEtomidine Infusion 0.2 MICROgram(s)/kG/Hr (4.29 mL/Hr) IV Continuous <Continuous>  dextrose 5%. 1000 milliLiter(s) (100 mL/Hr) IV Continuous <Continuous>  dextrose 50% Injectable 25 Gram(s) IV Push once  dextrose 50% Injectable 12.5 Gram(s) IV Push once  enoxaparin Injectable 40 milliGRAM(s) SubCutaneous every 12 hours  glucagon  Injectable 1 milliGRAM(s) IntraMuscular once  insulin lispro (ADMELOG) corrective regimen sliding scale   SubCutaneous three times a day before meals  levothyroxine 100 MICROGram(s) Oral daily  melatonin 5 milliGRAM(s) Oral at bedtime  pantoprazole    Tablet 40 milliGRAM(s) Oral before breakfast  piperacillin/tazobactam IVPB.. 3.375 Gram(s) IV Intermittent every 8 hours  polyethylene glycol 3350 17 Gram(s) Oral daily  senna 2 Tablet(s) Oral at bedtime      MEDICATIONS  (PRN):  acetaminophen   Tablet .. 650 milliGRAM(s) Oral every 6 hours PRN Temp greater or equal to 38C (100.4F), Mild Pain (1 - 3)  acetaminophen   Tablet .. 650 milliGRAM(s) Oral every 6 hours PRN Mild Pain (1 - 3)  morphine  - Injectable 1 milliGRAM(s) IV Push every 4 hours PRN prone      Physical Exam    Neuro :  no focal deficits  Respiratory: CTA B/L  CV: RRR, S1S2, no murmurs,   Abdominal: Soft, NT, ND +BS,  Extremities: No edema, + peripheral pulses          ASSESSMENT    Infection due to severe acute respiratory syndrome coronavirus 2 (SARS-CoV-2)  pneumonia,   hypoxia,   h/o hypothyroid,   sleep apnea,   PUD,   cervical OA   tracheal resection and reconstruction,   uterus myoma,   HTN,   gi bleed  Osteoporosis  Hyperparathyroidism      PLAN    id f/u   Decadron d/c'd 12/10/20  completed Remdesivir 12/06/20  s/p convalescent plasma  cont contact and airborne isolation,   cont albuterol inhaler,   cont tylenol prn, robitussin prn   pulm f/u   O2 sat (80% - 95%) on bipap   cont bipap as needed,   tmx 100.6  dvt prophylaxis  covid-19 antibody test neg,   resp viral panel positive for covid 19,   procalcitonin noted,   D-dimer, crp increasing   ferritin decreasing   esr, ldh, noted    cxr 12/12/20 unchanged from previous noted above  endo f/u  hypercalcemia due to hyperparathyroidism  high vitamin d 1-25 ? etiology-   ace lev wnl   hx of parathyroid surgery r/o pth hyperplasia  worsening hypercalcemia - s/p one dose of iv aredia 90mg   cont to monitor calcium  keep pt hydrated   pt DNR/DNI,   cont current meds  mgmt as per icu

## 2020-12-14 NOTE — CONSULT NOTE ADULT - ATTENDING COMMENTS
61 yo F from home lives with son (who is autistic, covid +) PMHx of hypothyroid, sleep apnea, HTN PUD, cervical OA PSH tracheal resection and reconstruction, uterous myoma, presents to the ED c/o shortness of breath x 2 days. Patient underwent sleep studyfrom 11/20 to 11/21 ,diagnosed with DARRELL, spiked fever the day after 101.6 associated with headache, and later on cough and diarrhea and tested positive for COVID on 111/23rd. pt has been having fever and diarrhea since Dx. Pt notes onset of  worsening shortness of breath  for past 2 days.  Patient denies vomiting, rash , joint pain or any other acute complaints. Pt started ABx likley amoxicillin (does not remember te name ) and Decadron 6mg X5 days.    Of note : pt reports h/o massive GIB resulted in emergency intubation for 5 days. Pt reports her trachea was damaged as a result of intubation. s/p tracheal resection and reconstruction April 2016. She has undergone multiple surveillance bronchoscopies,     ED course : patient saturated on high 80s on RA on arrival , was placed on NRB and descalated to NC 4 liters, pt saturated 96% on 4L NC,  on my evaluation patient was saturating 97% on 2 L NC however tachypneic while talking or getting exited.  CXR : patchy basilar infiltrates   (01 Dec 2020 13:01)    ICU reconsulted for worsening of respiratory status. Pt saturating 93% on 100% NRB. Pt  also started on REmdesivir today.     Assessment:     -Acute hypoxic respiratory failure due to COVID PNA,   -Sleep Apnea  -Hypothyroid    Plan  -transfer to icu  -continue O2 via HFNC to maintain sat >90%  -clarify goal of care  -continue remdesivir   -continue decadron 6 mg /day  -prone therapy as needed  -hemodynamic monitoring   -dvt/gi prophy
IMP: This is a 60 yr woman from home lives with son (autistic, covid +) PMHx of hypothyroid, sleep apnea, PUD, cervical OA PSH tracheal resection and reconstruction, uterous myoma, HTN,  presents to the ED c/o shortness of breath x 2 days. Patient underwent sleep study on 20th Nov  and was diagnosed with DARRELL,  associated with headache, and later on cough and diarrhea and tested positive for COVID on Nov 23rd.  pt reports  has been having fevers daily in range of 101-102 F  and diarrhea .    ED course : patient saturated on high 80s on RA on arrival , was placed on NRB and lowered  to NC 4 liters, pt saturated 96%  on 4L NC   Pt was diagnosed with Sleep Apnea but hasnt started on  CPAP yet by the Pulmonologist .   ICU consulted for new CPAP  and COVID infection . Pat is not needing BiPaP for hypoxia but for DARRELL. She doesn't know if CPAP vs BiPAP and setting .  Her Hypoxia resolved with o2 supp via NC    -no need for icu care at this time   -continue o2 supp to keep sat>90%  -continue decadron 6 mg /day  -f/u covid-19 antibx before rx remdesivir  -f/u biomarkers  -dvt/gi prophy  -please obtain PAP setting from prescriber or resp company
60 y F hx obesity, PUD, tracheal stenosis s/p reconstruction 2016 under ICU care for acute hypoxic resp failure 2/2 COVID 19 PNA, on BIPAP. Worsening resp status. s/p remdesevir, on steroids. On precedex for agitation. Palliative consulted to clarify goals of care, daughter now confirms that patient would not want to be intubated given her previous hx w traumatic intubation and complications. Remains DNR/DNI. Continue current medical management. Palliative will follow.

## 2020-12-14 NOTE — GOALS OF CARE CONVERSATION - ADVANCED CARE PLANNING - TREATMENT GUIDELINE COMMENT
Continue current medical mgt. up to the point of CPR and or intubation.  · Phone #	[phone]  · Phone #	[phone]

## 2020-12-14 NOTE — CONSULT NOTE ADULT - CONSULT REQUESTED DATE/TIME
01-Dec-2020 18:02
01-Dec-2020 20:32
02-Dec-2020 11:14
07-Dec-2020 10:16
14-Dec-2020 11:07
02-Dec-2020 16:26

## 2020-12-14 NOTE — CONSULT NOTE ADULT - CONSULT REASON
59 y/o F with +COVID, shock, respiratory failure. LACE 11. Patient completed own MOLST indicating DNR / DNI. 61 y/o F with +COVID, shock, respiratory failure. LACE 11. Request to establish goals of care.

## 2020-12-14 NOTE — CONSULT NOTE ADULT - PROBLEM SELECTOR RECOMMENDATION 5
See GOC section above. Previously completed HCP form on file indicating daughter/Sandra Stevenson ([phone]) as HCP agent. See GOC section above. Previously completed HCP form on file indicating daughter/Sandra Stevenson ([phone]) as HCP agent. New MOLT completed: DNR / DNI / trial IV fluids / use abx / OK with central line and pressor support.  NF2F: 30 minutes (12:45PM - 1:15PM)

## 2020-12-15 NOTE — PROGRESS NOTE ADULT - PROBLEM SELECTOR PLAN 1
2/2 shock due to COVID-19 PNA. WBC 13.57. Patient with intermittent periods of respiratory distress. Patient continues precedex, morphine PRN, albuterol, IV abx; on bipap. Patient completed remdesivir and decadron. Daughter/Sandra is HCP. DNR / DNI 2/2 shock due to COVID-19 PNA. WBC 13.57. Patient with intermittent periods of respiratory distress. Patient continues precedex, morphine PRN, albuterol, IV abx; on bipap. Plan: d/c pressor, non-essential medications; switch bipap to high flow for improved patient comfort. Overall, grave prognosis. Daughter/Sandra is HCP. DNR / DNI

## 2020-12-15 NOTE — CHART NOTE - NSCHARTNOTEFT_GEN_A_CORE
Pt Covid+. NPO (12/14)/ BIPAP. Now noted as to high flow. PT DNR/ DNI, "Comfort Care". Pt being followed by Palliative care w/ grave prognosis noted. No nutrition intervention warranted at this time. MD to monitor.

## 2020-12-15 NOTE — PROGRESS NOTE ADULT - ASSESSMENT
1. PNA 2nd to Covid-19  - PCR positive. IGG Negative.   - CXR noted.  - Continue abx  - Continue Vit C, Vit D, Zinc   - completed Dexamethasone   - Completed Remdesivir   - Continue Singulair and Pepcid   - Robitussin PRN for cough  - Tylenol PRN for temp   - Cont Bi-pap machine  -Taper FIO2 if feasible  - ICU management.   - Monitor O2 Sat.  - Monitor LFTs, LDH, D-Dimer, Ferritin, CRP and procalcitonin  - F.U CXR  - Prone positioning as tolerated   - DVT and GI PPX     2. DARRELL   - Newly diagnosed.   - Currently being treated for Covid-19.    3. Gastric Ulcer  - Stool Studies  - Monitor labs  - Continue meds  - GI eval.     4. Hx of Intubation   - S/P intubation x 5 days in 2016 for GIB  - Trachea was damaged during intubation.  - S/P tracheal resection and reconstruction.   - S/P surveillance bronchoscopies   - Patient is DNR/DNI

## 2020-12-15 NOTE — PROGRESS NOTE ADULT - SUBJECTIVE AND OBJECTIVE BOX
OVERNIGHT EVENTS: patient continues bipap; utilized 2 doses PRN morphine today, 5 yesterday 2/2 respiratory distress.     Present Symptoms:   Dyspnea:   Nausea/Vomiting:   Anxiety:    Depressed Mood:   Fatigue:   Loss of appetite:   Pain:                   location:   Review of Systems: [All others negative or Unable to obtain due to poor mentation]    MEDICATIONS  (STANDING):  ALBUTerol    90 MICROgram(s) HFA Inhaler 1 Puff(s) Inhalation every 4 hours  ALBUTerol    90 MICROgram(s) HFA Inhaler 2 Puff(s) Inhalation every 6 hours  chlorhexidine 2% Cloths 1 Application(s) Topical <User Schedule>  chlorhexidine 4% Liquid 1 Application(s) Topical <User Schedule>  chlorhexidine 4% Liquid 1 Application(s) Topical <User Schedule>  dexMEDEtomidine Infusion 0.7 MICROgram(s)/kG/Hr (15 mL/Hr) IV Continuous <Continuous>  dextrose 5%. 1000 milliLiter(s) (100 mL/Hr) IV Continuous <Continuous>  dextrose 50% Injectable 25 Gram(s) IV Push once  dextrose 50% Injectable 12.5 Gram(s) IV Push once  enoxaparin Injectable 40 milliGRAM(s) SubCutaneous every 12 hours  glucagon  Injectable 1 milliGRAM(s) IntraMuscular once  insulin lispro (ADMELOG) corrective regimen sliding scale   SubCutaneous three times a day before meals  levothyroxine 100 MICROGram(s) Oral daily  melatonin 5 milliGRAM(s) Oral at bedtime  pantoprazole    Tablet 40 milliGRAM(s) Oral before breakfast  phenylephrine    Infusion 0.1 MICROgram(s)/kG/Min (3.21 mL/Hr) IV Continuous <Continuous>  piperacillin/tazobactam IVPB.. 3.375 Gram(s) IV Intermittent every 8 hours  polyethylene glycol 3350 17 Gram(s) Oral daily  senna 2 Tablet(s) Oral at bedtime  vancomycin  IVPB 1250 milliGRAM(s) IV Intermittent every 12 hours    MEDICATIONS  (PRN):  acetaminophen   Tablet .. 650 milliGRAM(s) Oral every 6 hours PRN Temp greater or equal to 38C (100.4F), Mild Pain (1 - 3)  acetaminophen   Tablet .. 650 milliGRAM(s) Oral every 6 hours PRN Mild Pain (1 - 3)  morphine  - Injectable 1 milliGRAM(s) IV Push every 1 hour PRN respiratory distress  sodium chloride 0.9% lock flush 10 milliLiter(s) IV Push every 1 hour PRN Pre/post blood products, medications, blood draw, and to maintain line patency  sodium chloride 0.9% lock flush 10 milliLiter(s) IV Push every 1 hour PRN Pre/post blood products, medications, blood draw, and to maintain line patency      PHYSICAL EXAM:  Vital Signs Last 24 Hrs  T(C): 38.3 (15 Dec 2020 11:00), Max: 38.5 (15 Dec 2020 08:00)  T(F): 100.9 (15 Dec 2020 11:00), Max: 101.3 (15 Dec 2020 08:00)  HR: 64 (15 Dec 2020 11:00) (54 - 77)  BP: 106/63 (15 Dec 2020 11:00) (70/48 - 118/71)  BP(mean): 71 (15 Dec 2020 11:00) (48 - 98)  RR: 41 (15 Dec 2020 11:00) (20 - 45)  SpO2: 81% (15 Dec 2020 11:00) (73% - 91%)  General: Patient not medically stable for full physical exam. Patient on bipap, + tachypnea, on pressor.   Karnofsky Performance Score/Palliative Performance Status Version2:     20%    HEENT: patient not medically stable to assess  Lungs: tachypnea/labored breathing, on bipap.  + intermittent respiratory distress   CV: on pressor  GI:  incontinent  : primafit  Musculoskeletal: patient requires assistance with all ADLs     Skin: patient not medically stable to assess  Neuro: patient verbal, alert/oriented   Oral intake ability: unable/only mouth care    Diet: NPO      LABS:                          12.1   13.57 )-----------( 420      ( 15 Dec 2020 06:08 )             38.8     12-15    139  |  104  |  21<H>  ----------------------------<  102<H>  4.0   |  28  |  0.44<L>    Ca    9.3      15 Dec 2020 06:08  Phos  2.3     12-15  Mg     2.5     12-15    TPro  6.9  /  Alb  1.6<L>  /  TBili  0.6  /  DBili  x   /  AST  41<H>  /  ALT  99<H>  /  AlkPhos  248<H>  12-15        RADIOLOGY & ADDITIONAL STUDIES:  < from: Xray Chest 1 View-PORTABLE IMMEDIATE (Xray Chest 1 View-PORTABLE IMMEDIATE .) (12.14.20 @ 15:29) >  EXAM:  XR CHEST PORTABLE IMMED 1V                        PROCEDURE DATE:  12/14/2020    INTERPRETATION:  CLINICAL STATEMENT: Follow-up chest pain.  TECHNIQUE: AP view of the chest.  COMPARISON: 12/12/2020  FINDINGS/  IMPRESSION:  New right central line noted tip overlying the right atrium. No pneumothorax.  Bilateral airspace opacities without significant change.  No pleural effusion  Heart size cannot be accurately assessed in this projection.  < end of copied text >      ADVANCE DIRECTIVES: MOLST: DNR / DNI   OVERNIGHT EVENTS: patient continues bipap; utilized 2 doses PRN morphine today, 5 yesterday 2/2 respiratory distress.     Present Symptoms:   Dyspnea:   Nausea/Vomiting:   Anxiety:    Depressed Mood:   Fatigue:   Loss of appetite:   Pain:                   location:   Review of Systems: [All others negative or Unable to obtain due to poor mentation]    MEDICATIONS  (STANDING):  ALBUTerol    90 MICROgram(s) HFA Inhaler 1 Puff(s) Inhalation every 4 hours  ALBUTerol    90 MICROgram(s) HFA Inhaler 2 Puff(s) Inhalation every 6 hours  chlorhexidine 2% Cloths 1 Application(s) Topical <User Schedule>  chlorhexidine 4% Liquid 1 Application(s) Topical <User Schedule>  chlorhexidine 4% Liquid 1 Application(s) Topical <User Schedule>  dexMEDEtomidine Infusion 0.7 MICROgram(s)/kG/Hr (15 mL/Hr) IV Continuous <Continuous>  dextrose 5%. 1000 milliLiter(s) (100 mL/Hr) IV Continuous <Continuous>  dextrose 50% Injectable 25 Gram(s) IV Push once  dextrose 50% Injectable 12.5 Gram(s) IV Push once  enoxaparin Injectable 40 milliGRAM(s) SubCutaneous every 12 hours  glucagon  Injectable 1 milliGRAM(s) IntraMuscular once  insulin lispro (ADMELOG) corrective regimen sliding scale   SubCutaneous three times a day before meals  levothyroxine 100 MICROGram(s) Oral daily  melatonin 5 milliGRAM(s) Oral at bedtime  pantoprazole    Tablet 40 milliGRAM(s) Oral before breakfast  phenylephrine    Infusion 0.1 MICROgram(s)/kG/Min (3.21 mL/Hr) IV Continuous <Continuous>  piperacillin/tazobactam IVPB.. 3.375 Gram(s) IV Intermittent every 8 hours  polyethylene glycol 3350 17 Gram(s) Oral daily  senna 2 Tablet(s) Oral at bedtime  vancomycin  IVPB 1250 milliGRAM(s) IV Intermittent every 12 hours    MEDICATIONS  (PRN):  acetaminophen   Tablet .. 650 milliGRAM(s) Oral every 6 hours PRN Temp greater or equal to 38C (100.4F), Mild Pain (1 - 3)  acetaminophen   Tablet .. 650 milliGRAM(s) Oral every 6 hours PRN Mild Pain (1 - 3)  morphine  - Injectable 1 milliGRAM(s) IV Push every 1 hour PRN respiratory distress  sodium chloride 0.9% lock flush 10 milliLiter(s) IV Push every 1 hour PRN Pre/post blood products, medications, blood draw, and to maintain line patency  sodium chloride 0.9% lock flush 10 milliLiter(s) IV Push every 1 hour PRN Pre/post blood products, medications, blood draw, and to maintain line patency    PHYSICAL EXAM:  Vital Signs Last 24 Hrs  T(C): 38.3 (15 Dec 2020 11:00), Max: 38.5 (15 Dec 2020 08:00)  T(F): 100.9 (15 Dec 2020 11:00), Max: 101.3 (15 Dec 2020 08:00)  HR: 64 (15 Dec 2020 11:00) (54 - 77)  BP: 106/63 (15 Dec 2020 11:00) (70/48 - 118/71)  BP(mean): 71 (15 Dec 2020 11:00) (48 - 98)  RR: 41 (15 Dec 2020 11:00) (20 - 45)  SpO2: 81% (15 Dec 2020 11:00) (73% - 91%)  General: Patient not medically stable for full physical exam. Patient on bipap, + tachypnea, on pressor.   Karnofsky Performance Score/Palliative Performance Status Version2:     20%    HEENT: patient not medically stable to assess  Lungs: tachypnea/labored breathing, on bipap.  + intermittent periods of respiratory distress   CV: on pressor  GI:   continent  : primafit  Musculoskeletal: patient requires assistance with all ADLs     Skin: patient not medically stable to assess  Neuro: patient verbal, alert/oriented.   Oral intake ability: unable/only mouth care    Diet: NPO    LABS:                          12.1   13.57 )-----------( 420      ( 15 Dec 2020 06:08 )             38.8     12-15    139  |  104  |  21<H>  ----------------------------<  102<H>  4.0   |  28  |  0.44<L>    Ca    9.3      15 Dec 2020 06:08  Phos  2.3     12-15  Mg     2.5     12-15    TPro  6.9  /  Alb  1.6<L>  /  TBili  0.6  /  DBili  x   /  AST  41<H>  /  ALT  99<H>  /  AlkPhos  248<H>  12-15    RADIOLOGY & ADDITIONAL STUDIES:  < from: Xray Chest 1 View-PORTABLE IMMEDIATE (Xray Chest 1 View-PORTABLE IMMEDIATE .) (12.14.20 @ 15:29) >  EXAM:  XR CHEST PORTABLE IMMED 1V                        PROCEDURE DATE:  12/14/2020    INTERPRETATION:  CLINICAL STATEMENT: Follow-up chest pain.  TECHNIQUE: AP view of the chest.  COMPARISON: 12/12/2020  FINDINGS/  IMPRESSION:  New right central line noted tip overlying the right atrium. No pneumothorax.  Bilateral airspace opacities without significant change.  No pleural effusion  Heart size cannot be accurately assessed in this projection.  < end of copied text >    ADVANCE DIRECTIVES: HCP, MOLST: DNR / DNI; goal of care is comfort.

## 2020-12-15 NOTE — PROGRESS NOTE ADULT - CONVERSATION DETAILS
12/15/20: 12/15/20: Spoke with patient at bedside:         Spoke with patient's HCP and daughter/Sandra Stevenson on the phone; Dr. Fernandez present. Discussed status. Aware of patient's stated wishes of discontinuing the bipap and focusing on comfort care. Daughter echoes patient's stated wishes, saying patient has told her "please let me go. I'm suffering. This is hurting my heart so much." Daughter acknowledges that non-essential medications will be discontinued along with pressor support; bipap will be switch to high flow. Educated death likely to occur within hours to days. Aware morphine ATC and PRN ordered for symptom management. All questions answered; supportive counseling provided. 12/15/20: Spoke with patient at bedside in [place] regarding further goals of care. patient expressed that she is suffering and is ready to die. She does not want to continue w BIPAP. Support provided.       Spoke with patient's HCP and daughter/Sandra Stevenson on the phone; Dr. Fernandez present. Discussed status. Aware of patient's stated wishes of discontinuing the bipap and focusing on comfort care. Daughter echoes patient's stated wishes, saying patient has told her "please let me go. I'm suffering. This is hurting my heart so much." Daughter acknowledges that non-essential medications will be discontinued along with pressor support; bipap will be switch to high flow. Educated death likely to occur within hours to days. Aware morphine ATC and PRN ordered for symptom management. All questions answered; supportive counseling provided.

## 2020-12-15 NOTE — PROGRESS NOTE ADULT - PROBLEM SELECTOR PLAN 3
comorbid shock. CXR indicates unchanged scattered and diffuse multifocal ill-defined airspace consolidations. Patient on sedation, bipap, IV abx, morphine PRN. Patient completed remdesivir and decadron. Daughter/Sandra is HCP; DNR / DNI. comorbid shock. Patient on bipap, IV abx, morphine PRN. Patient completed remdesivir and decadron. Plan: d/c pressor, change bipap to high flow; goal of care is comfort. Overall, grave prognosis. Daughter/Sandra is HCP; DNR / DNI.

## 2020-12-15 NOTE — PHARMACOTHERAPY INTERVENTION NOTE - COMMENTS
Informed prescriber that the patient received 10 days of dexamethasone doses, so it can now be discontinued.
Recommended to discontinue duplicate orders for acetaminophen and albuterol.
During Critical Care rounds, the montelukast, cholecalciferol, zinc sulfate, and ascorbic acid were discontinued for the treatment of COVID-19.

## 2020-12-15 NOTE — PROGRESS NOTE ADULT - ASSESSMENT
ASSESSMENT AND PLAN:  59 yo F from home lives with son (who is autistic, covid +) PMHx of hypothyroid, sleep apnea, HTN PUD, cervical OA PSH tracheal resection and reconstruction, uterous myoma, presents to the ED c/o shortness of breath x 2 days. Patient underwent sleep studyfrom 11/20 to 11/21 ,diagnosed with DARRELL, spiked fever the day after 101.6 associated with headache, and later on cough and diarrhea and tested positive for COVID on 111/23rd. pt has been having fever and diarrhea since Dx. Pt notes onset of  worsening shortness of breath  for past 2 days.  Patient denies vomiting, rash , joint pain or any other acute complaints. Pt started ABx likley amoxicillin (does not remember te name ) and Decadron 6mg X5 days.    Of note : pt reports h/o massive GIB resulted in emergency intubation for 5 days. Pt reports her trachea was damaged as a result of intubation. s/p tracheal resection and reconstruction April 2016. She has undergone multiple surveillance bronchoscopies,    ED course : patient saturated on high 80s on RA on arrival , was placed on NRB and descalated to NC 4 liters, pt saturated 96% on 4L NC,  on my evaluation patient was saturating 97% on 2 L NC however tachypneic while talking or getting exited.  CXR : patchy basilar infiltrates   (01 Dec 2020 13:01)  Pt admitted to ICU  for AHRF requiring high flow oxygen therapy and bipap    Assessment:   Acute hypoxic respiratory failure due to COVID PNA   Sleep Apnea,   PUD, hypothyroidism     === Neuro===  Alert and oriented x 3 Baseline   12/14 pt is sedated for comfort with morphine and precedex   pt made comfort care with no blood draws or aggressive measures     #Anxiety  - started on precedex as becomes anxious and started on morphine 12/14 for respiratory distress       === Cardiovascular===  Hypotensive in the setting of multiple medications   pressors discontinued   pt made comfort care with no blood draws or aggressive measures       ===- Pulm===  Acute hypoxic respiratory failure due to COVID PNA   - completed remdesevir  - s/p Rocephin and Azith 12/1-3, Dcd and started on zosyn for possible aspiration pNA   - completed decadron  - ID Dr Nelson  pt made comfort care with no blood draws or aggressive measures       ====ID===  leukocytosis continues  started on vancomycin as pt is having fevers     === Nephro===  no active issues     ===GI===  #Elevated LFTs  - LFTs trending down  - likely from covid  - Hepatitis C panel negative, Hepatitis B Ab positive  - monitor LFTs    #Hx of PUD:   - c/w Protonix     === Heme===  #Leukocytosis   - Likely due to covid infection  - monitor CBC    ===Endocrine===  #hyperglycemia  a1c 5.8 .  -sliding scale switched to moderate as per endo  -Endo Dr Lopez    #hypothyroidism   - TSH low 0.14  - T3  48, Free Thyroxine 1.3  - c/w Levothyroxine     #Hypercalcemia  - Elevated calcium 11, vit D-25 WNL,  vitamin D, 1-25 high, PTH: high   - Repeat 1-25 OH elevated  - outpatient workup     === Skin/Catheters===  No rashes.   RIJ placed 12/14    ===Prophylaxis ===  dc as pt is comfort      ===GOC===  DNR/DNI, comfort care today 12/15     ===Disposition===  Monitor in ICU   ASSESSMENT AND PLAN:  59 yo F from home lives with son (who is autistic, covid +) PMHx of hypothyroid, sleep apnea, HTN PUD, cervical OA PSH tracheal resection and reconstruction, uterous myoma, presents to the ED c/o shortness of breath x 2 days. Patient underwent sleep studyfrom 11/20 to 11/21 ,diagnosed with DARRELL, spiked fever the day after 101.6 associated with headache, and later on cough and diarrhea and tested positive for COVID on 111/23rd. pt has been having fever and diarrhea since Dx. Pt notes onset of  worsening shortness of breath  for past 2 days.  Patient denies vomiting, rash , joint pain or any other acute complaints. Pt started ABx likley amoxicillin (does not remember te name ) and Decadron 6mg X5 days.    Of note : pt reports h/o massive GIB resulted in emergency intubation for 5 days. Pt reports her trachea was damaged as a result of intubation. s/p tracheal resection and reconstruction April 2016. She has undergone multiple surveillance bronchoscopies,    ED course : patient saturated on high 80s on RA on arrival , was placed on NRB and descalated to NC 4 liters, pt saturated 96% on 4L NC,  on my evaluation patient was saturating 97% on 2 L NC however tachypneic while talking or getting exited.  CXR : patchy basilar infiltrates   (01 Dec 2020 13:01)  Pt admitted to ICU  for AHRF requiring high flow oxygen therapy and bipap    Assessment:   Acute hypoxic respiratory failure due to COVID PNA   Sleep Apnea,   PUD, hypothyroidism     === Neuro===  Alert and oriented x 3 Baseline   12/14 pt is sedated for comfort with morphine and precedex   pt made comfort care with no blood draws or aggressive measures     #Anxiety  - started on precedex as becomes anxious and started on morphine 12/14 for respiratory distress       === Cardiovascular===  Hypotensive in the setting of multiple medications   pressors discontinued   pt made comfort care with no blood draws or aggressive measures       ===- Pulm===  Acute hypoxic respiratory failure due to COVID PNA   - completed remdesevir  - s/p Rocephin and Azith 12/1-3, Dcd and started on zosyn for possible aspiration pNA   - completed decadron  - ID Dr Nelson  pt made comfort care with no blood draws or aggressive measures       ====ID===  leukocytosis continues  started on vancomycin as pt is having fevers 12/15     === Nephro===  no active issues     ===GI===  #Elevated LFTs  - LFTs trending down  - likely from covid  - Hepatitis C panel negative, Hepatitis B Ab positive  - monitor LFTs    #Hx of PUD:   - c/w Protonix     === Heme===  #Leukocytosis   - Likely due to covid infection  - monitor CBC    ===Endocrine===  #hyperglycemia  a1c 5.8 .  -Endo Dr Lopez    #hypothyroidism   - TSH low 0.14  - T3  48, Free Thyroxine 1.3  - c/w Levothyroxine     #Hypercalcemia  - Elevated calcium 11, vit D-25 WNL,  vitamin D, 1-25 high, PTH: high   - Repeat 1-25 OH elevated  - outpatient workup     === Skin/Catheters===  No rashes.   RIJ placed 12/14    ===Prophylaxis ===  dc as pt is comfort      ===GOC===  DNR/DNI, comfort care today 12/15     ===Disposition===  Monitor in ICU

## 2020-12-15 NOTE — PROGRESS NOTE ADULT - SUBJECTIVE AND OBJECTIVE BOX
INTERVAL HPI/OVERNIGHT EVENTS: ***    PRESSORS: [ ] YES [ ] NO  WHICH:    ANTIBIOTICS:                  DATE STARTED:  ANTIBIOTICS:                  DATE STARTED:  ANTIBIOTICS:                  DATE STARTED:    Antimicrobial:  piperacillin/tazobactam IVPB.. 3.375 Gram(s) IV Intermittent every 8 hours  vancomycin  IVPB 1250 milliGRAM(s) IV Intermittent every 12 hours    Cardiovascular:  phenylephrine    Infusion 0.1 MICROgram(s)/kG/Min IV Continuous <Continuous>    Pulmonary:  ALBUTerol    90 MICROgram(s) HFA Inhaler 2 Puff(s) Inhalation every 6 hours    Hematalogic:  enoxaparin Injectable 40 milliGRAM(s) SubCutaneous every 12 hours    Other:  acetaminophen   Tablet .. 650 milliGRAM(s) Oral every 6 hours PRN  chlorhexidine 2% Cloths 1 Application(s) Topical <User Schedule>  chlorhexidine 4% Liquid 1 Application(s) Topical <User Schedule>  chlorhexidine 4% Liquid 1 Application(s) Topical <User Schedule>  dexMEDEtomidine Infusion 0.7 MICROgram(s)/kG/Hr IV Continuous <Continuous>  dextrose 5%. 1000 milliLiter(s) IV Continuous <Continuous>  dextrose 50% Injectable 25 Gram(s) IV Push once  dextrose 50% Injectable 12.5 Gram(s) IV Push once  glucagon  Injectable 1 milliGRAM(s) IntraMuscular once  insulin lispro (ADMELOG) corrective regimen sliding scale   SubCutaneous three times a day before meals  levothyroxine 100 MICROGram(s) Oral daily  melatonin 5 milliGRAM(s) Oral at bedtime  morphine  - Injectable 1 milliGRAM(s) IV Push every 1 hour PRN  pantoprazole    Tablet 40 milliGRAM(s) Oral before breakfast  polyethylene glycol 3350 17 Gram(s) Oral daily  senna 2 Tablet(s) Oral at bedtime  sodium chloride 0.9% lock flush 10 milliLiter(s) IV Push every 1 hour PRN  sodium chloride 0.9% lock flush 10 milliLiter(s) IV Push every 1 hour PRN    acetaminophen   Tablet .. 650 milliGRAM(s) Oral every 6 hours PRN  ALBUTerol    90 MICROgram(s) HFA Inhaler 2 Puff(s) Inhalation every 6 hours  chlorhexidine 2% Cloths 1 Application(s) Topical <User Schedule>  chlorhexidine 4% Liquid 1 Application(s) Topical <User Schedule>  chlorhexidine 4% Liquid 1 Application(s) Topical <User Schedule>  dexMEDEtomidine Infusion 0.7 MICROgram(s)/kG/Hr IV Continuous <Continuous>  dextrose 5%. 1000 milliLiter(s) IV Continuous <Continuous>  dextrose 50% Injectable 25 Gram(s) IV Push once  dextrose 50% Injectable 12.5 Gram(s) IV Push once  enoxaparin Injectable 40 milliGRAM(s) SubCutaneous every 12 hours  glucagon  Injectable 1 milliGRAM(s) IntraMuscular once  insulin lispro (ADMELOG) corrective regimen sliding scale   SubCutaneous three times a day before meals  levothyroxine 100 MICROGram(s) Oral daily  melatonin 5 milliGRAM(s) Oral at bedtime  morphine  - Injectable 1 milliGRAM(s) IV Push every 1 hour PRN  pantoprazole    Tablet 40 milliGRAM(s) Oral before breakfast  phenylephrine    Infusion 0.1 MICROgram(s)/kG/Min IV Continuous <Continuous>  piperacillin/tazobactam IVPB.. 3.375 Gram(s) IV Intermittent every 8 hours  polyethylene glycol 3350 17 Gram(s) Oral daily  senna 2 Tablet(s) Oral at bedtime  sodium chloride 0.9% lock flush 10 milliLiter(s) IV Push every 1 hour PRN  sodium chloride 0.9% lock flush 10 milliLiter(s) IV Push every 1 hour PRN  vancomycin  IVPB 1250 milliGRAM(s) IV Intermittent every 12 hours    Drug Dosing Weight  Height (cm): 162.6 (01 Dec 2020 09:53)  Weight (kg): 85.7 (01 Dec 2020 09:53)  BMI (kg/m2): 32.4 (01 Dec 2020 09:53)  BSA (m2): 1.91 (01 Dec 2020 09:53)    CENTRAL LINE: [ ] YES [ ] NO  LOCATION:   DATE INSERTED:    DONALD: [ ] YES [ ] NO    DATE INSERTED:    A-LINE:  [ ] YES [ ] NO  LOCATION:   DATE INSERTED:    ICU Vital Signs Last 24 Hrs  T(C): 38.3 (15 Dec 2020 11:00), Max: 38.5 (15 Dec 2020 08:00)  T(F): 100.9 (15 Dec 2020 11:00), Max: 101.3 (15 Dec 2020 08:00)  HR: 68 (15 Dec 2020 12:25) (54 - 77)  BP: 106/63 (15 Dec 2020 11:00) (70/48 - 118/71)  BP(mean): 71 (15 Dec 2020 11:00) (48 - 98)  ABP: --  ABP(mean): --  RR: 41 (15 Dec 2020 11:00) (20 - 45)  SpO2: 88% (15 Dec 2020 12:25) (73% - 91%)      ABG - ( 14 Dec 2020 04:08 )  pH, Arterial: 7.42  pH, Blood: x     /  pCO2: 49    /  pO2: 58    / HCO3: 31    / Base Excess: 6.1   /  SaO2: 90                    12-14 @ 07:01  -  12-15 @ 07:00  --------------------------------------------------------  IN: 638.7 mL / OUT: 0 mL / NET: 638.7 mL            REVIEW OF SYSTEMS:    CONSTITUTIONAL: No weakness, fevers or chills  NECK: No pain or stiffnes  RESPIRATORY: ++ cough, +wheezing, + dyspnea,   CARDIOVASCULAR: No chest pain or palpitations  GASTROINTESTINAL: No abdominal or epigastric pain. No nausea, vomiting, No diarrhea or constipation. No melena or hematochezia.  GENITOURINARY: No dysuria, frequency or hematuria  NEUROLOGICAL: No numbness or weakness  All other review of systems is negative unless indicated above    PHYSICAL EXAM:    >>> <<<    LABS:  CBC Full  -  ( 15 Dec 2020 06:08 )  WBC Count : 13.57 K/uL  RBC Count : 3.91 M/uL  Hemoglobin : 12.1 g/dL  Hematocrit : 38.8 %  Platelet Count - Automated : 420 K/uL  Mean Cell Volume : 99.2 fl  Mean Cell Hemoglobin : 30.9 pg  Mean Cell Hemoglobin Concentration : 31.2 gm/dL  Auto Neutrophil # : 12.05 K/uL  Auto Lymphocyte # : 0.75 K/uL  Auto Monocyte # : 0.38 K/uL  Auto Eosinophil # : 0.30 K/uL  Auto Basophil # : 0.01 K/uL  Auto Neutrophil % : 88.8 %  Auto Lymphocyte % : 5.5 %  Auto Monocyte % : 2.8 %  Auto Eosinophil % : 2.2 %  Auto Basophil % : 0.1 %    12-15    139  |  104  |  21<H>  ----------------------------<  102<H>  4.0   |  28  |  0.44<L>    Ca    9.3      15 Dec 2020 06:08  Phos  2.3     12-15  Mg     2.5     12-15    TPro  6.9  /  Alb  1.6<L>  /  TBili  0.6  /  DBili  x   /  AST  41<H>  /  ALT  99<H>  /  AlkPhos  248<H>  12-15            RADIOLOGY & ADDITIONAL STUDIES REVIEWED:  ***    [ ]GOALS OF CARE DISCUSSION WITH PATIENT/FAMILY/PROXY:    CRITICAL CARE TIME SPENT: 35 minutes INTERVAL HPI/OVERNIGHT EVENTS: Patient continues to be hypoxic to 80's and refuses to be proned     PRESSORS: [ x] YES [ ] NO  WHICH: pheny     ANTIBIOTICS:      yes             DATE STARTED: vanc 12/15  ANTIBIOTICS:                  DATE STARTED:  ANTIBIOTICS:                  DATE STARTED:    Antimicrobial:  piperacillin/tazobactam IVPB.. 3.375 Gram(s) IV Intermittent every 8 hours  vancomycin  IVPB 1250 milliGRAM(s) IV Intermittent every 12 hours    Cardiovascular:  phenylephrine    Infusion 0.1 MICROgram(s)/kG/Min IV Continuous <Continuous>    Pulmonary:  ALBUTerol    90 MICROgram(s) HFA Inhaler 2 Puff(s) Inhalation every 6 hours    Hematalogic:  enoxaparin Injectable 40 milliGRAM(s) SubCutaneous every 12 hours    Other:  acetaminophen   Tablet .. 650 milliGRAM(s) Oral every 6 hours PRN  chlorhexidine 2% Cloths 1 Application(s) Topical <User Schedule>  chlorhexidine 4% Liquid 1 Application(s) Topical <User Schedule>  chlorhexidine 4% Liquid 1 Application(s) Topical <User Schedule>  dexMEDEtomidine Infusion 0.7 MICROgram(s)/kG/Hr IV Continuous <Continuous>  dextrose 5%. 1000 milliLiter(s) IV Continuous <Continuous>  dextrose 50% Injectable 25 Gram(s) IV Push once  dextrose 50% Injectable 12.5 Gram(s) IV Push once  glucagon  Injectable 1 milliGRAM(s) IntraMuscular once  insulin lispro (ADMELOG) corrective regimen sliding scale   SubCutaneous three times a day before meals  levothyroxine 100 MICROGram(s) Oral daily  melatonin 5 milliGRAM(s) Oral at bedtime  morphine  - Injectable 1 milliGRAM(s) IV Push every 1 hour PRN  pantoprazole    Tablet 40 milliGRAM(s) Oral before breakfast  polyethylene glycol 3350 17 Gram(s) Oral daily  senna 2 Tablet(s) Oral at bedtime  sodium chloride 0.9% lock flush 10 milliLiter(s) IV Push every 1 hour PRN  sodium chloride 0.9% lock flush 10 milliLiter(s) IV Push every 1 hour PRN    acetaminophen   Tablet .. 650 milliGRAM(s) Oral every 6 hours PRN  ALBUTerol    90 MICROgram(s) HFA Inhaler 2 Puff(s) Inhalation every 6 hours  chlorhexidine 2% Cloths 1 Application(s) Topical <User Schedule>  chlorhexidine 4% Liquid 1 Application(s) Topical <User Schedule>  chlorhexidine 4% Liquid 1 Application(s) Topical <User Schedule>  dexMEDEtomidine Infusion 0.7 MICROgram(s)/kG/Hr IV Continuous <Continuous>  dextrose 5%. 1000 milliLiter(s) IV Continuous <Continuous>  dextrose 50% Injectable 25 Gram(s) IV Push once  dextrose 50% Injectable 12.5 Gram(s) IV Push once  enoxaparin Injectable 40 milliGRAM(s) SubCutaneous every 12 hours  glucagon  Injectable 1 milliGRAM(s) IntraMuscular once  insulin lispro (ADMELOG) corrective regimen sliding scale   SubCutaneous three times a day before meals  levothyroxine 100 MICROGram(s) Oral daily  melatonin 5 milliGRAM(s) Oral at bedtime  morphine  - Injectable 1 milliGRAM(s) IV Push every 1 hour PRN  pantoprazole    Tablet 40 milliGRAM(s) Oral before breakfast  phenylephrine    Infusion 0.1 MICROgram(s)/kG/Min IV Continuous <Continuous>  piperacillin/tazobactam IVPB.. 3.375 Gram(s) IV Intermittent every 8 hours  polyethylene glycol 3350 17 Gram(s) Oral daily  senna 2 Tablet(s) Oral at bedtime  sodium chloride 0.9% lock flush 10 milliLiter(s) IV Push every 1 hour PRN  sodium chloride 0.9% lock flush 10 milliLiter(s) IV Push every 1 hour PRN  vancomycin  IVPB 1250 milliGRAM(s) IV Intermittent every 12 hours    Drug Dosing Weight  Height (cm): 162.6 (01 Dec 2020 09:53)  Weight (kg): 85.7 (01 Dec 2020 09:53)  BMI (kg/m2): 32.4 (01 Dec 2020 09:53)  BSA (m2): 1.91 (01 Dec 2020 09:53)    CENTRAL LINE: [x ] YES [ ] NO  LOCATION:   DATE INSERTED: [facility] 12/14    DONALD: [ ] YES [x ] NO    DATE INSERTED:    A-LINE:  [ ] YES [x ] NO  LOCATION:   DATE INSERTED:    ICU Vital Signs Last 24 Hrs  T(C): 38.3 (15 Dec 2020 11:00), Max: 38.5 (15 Dec 2020 08:00)  T(F): 100.9 (15 Dec 2020 11:00), Max: 101.3 (15 Dec 2020 08:00)  HR: 68 (15 Dec 2020 12:25) (54 - 77)  BP: 106/63 (15 Dec 2020 11:00) (70/48 - 118/71)  BP(mean): 71 (15 Dec 2020 11:00) (48 - 98)  ABP: --  ABP(mean): --  RR: 41 (15 Dec 2020 11:00) (20 - 45)  SpO2: 88% (15 Dec 2020 12:25) (73% - 91%)      ABG - ( 14 Dec 2020 04:08 )  pH, Arterial: 7.42  pH, Blood: x     /  pCO2: 49    /  pO2: 58    / HCO3: 31    / Base Excess: 6.1   /  SaO2: 90                    12-14 @ 07:01  -  12-15 @ 07:00  --------------------------------------------------------  IN: 638.7 mL / OUT: 0 mL / NET: 638.7 mL            REVIEW OF SYSTEMS:    unable to obtain are pt is just saying she wants to be left alone    PHYSICAL EXAM:    GENERAL: NAD, lying in bed comfortably  HEAD:  Atraumatic, Normocephalic  EYES: EOMI, PERRLA, conjunctiva and sclera clear  ENT: Moist mucous membranes  NECK: Supple, No JVD  CHEST/LUNG: Clear to auscultation bilaterally; No rales, rhonchi, wheezing, or rubs.  HEART: Regular rate and rhythm; S1+ S2+  ABDOMEN: Bowel sounds present; Soft, Nontender, Nondistended. No hepatomegally  EXTREMITIES:  2+ Peripheral Pulses, brisk capillary refill. No clubbing, cyanosis, or edema  NERVOUS SYSTEM:  Alert & Oriented X3, speech clear. No deficits   MSK: FROM all 4 extremities, full and equal strength  SKIN: No rashes or lesions    LABS:  CBC Full  -  ( 15 Dec 2020 06:08 )  WBC Count : 13.57 K/uL  RBC Count : 3.91 M/uL  Hemoglobin : 12.1 g/dL  Hematocrit : 38.8 %  Platelet Count - Automated : 420 K/uL  Mean Cell Volume : 99.2 fl  Mean Cell Hemoglobin : 30.9 pg  Mean Cell Hemoglobin Concentration : 31.2 gm/dL  Auto Neutrophil # : 12.05 K/uL  Auto Lymphocyte # : 0.75 K/uL  Auto Monocyte # : 0.38 K/uL  Auto Eosinophil # : 0.30 K/uL  Auto Basophil # : 0.01 K/uL  Auto Neutrophil % : 88.8 %  Auto Lymphocyte % : 5.5 %  Auto Monocyte % : 2.8 %  Auto Eosinophil % : 2.2 %  Auto Basophil % : 0.1 %    12-15    139  |  104  |  21<H>  ----------------------------<  102<H>  4.0   |  28  |  0.44<L>    Ca    9.3      15 Dec 2020 06:08  Phos  2.3     12-15  Mg     2.5     12-15    TPro  6.9  /  Alb  1.6<L>  /  TBili  0.6  /  DBili  x   /  AST  41<H>  /  ALT  99<H>  /  AlkPhos  248<H>  12-15            RADIOLOGY & ADDITIONAL STUDIES REVIEWED:  ***    [ ]GOALS OF CARE DISCUSSION WITH PATIENT/FAMILY/PROXY:    CRITICAL CARE TIME SPENT: 35 minutes INTERVAL HPI/OVERNIGHT EVENTS: Patient continues to be hypoxic to 80's and refuses to be proned,  utilized 2 doses PRN morphine today, 5 yesterday 2/2 respiratory distress.       PRESSORS: [ x] YES [ ] NO  WHICH: pheny     ANTIBIOTICS:      yes             DATE STARTED: vanc 12/15  ANTIBIOTICS:                  DATE STARTED:  ANTIBIOTICS:                  DATE STARTED:    Antimicrobial:  piperacillin/tazobactam IVPB.. 3.375 Gram(s) IV Intermittent every 8 hours  vancomycin  IVPB 1250 milliGRAM(s) IV Intermittent every 12 hours    Cardiovascular:  phenylephrine    Infusion 0.1 MICROgram(s)/kG/Min IV Continuous <Continuous>    Pulmonary:  ALBUTerol    90 MICROgram(s) HFA Inhaler 2 Puff(s) Inhalation every 6 hours    Hematalogic:  enoxaparin Injectable 40 milliGRAM(s) SubCutaneous every 12 hours    Other:  acetaminophen   Tablet .. 650 milliGRAM(s) Oral every 6 hours PRN  chlorhexidine 2% Cloths 1 Application(s) Topical <User Schedule>  chlorhexidine 4% Liquid 1 Application(s) Topical <User Schedule>  chlorhexidine 4% Liquid 1 Application(s) Topical <User Schedule>  dexMEDEtomidine Infusion 0.7 MICROgram(s)/kG/Hr IV Continuous <Continuous>  dextrose 5%. 1000 milliLiter(s) IV Continuous <Continuous>  dextrose 50% Injectable 25 Gram(s) IV Push once  dextrose 50% Injectable 12.5 Gram(s) IV Push once  glucagon  Injectable 1 milliGRAM(s) IntraMuscular once  insulin lispro (ADMELOG) corrective regimen sliding scale   SubCutaneous three times a day before meals  levothyroxine 100 MICROGram(s) Oral daily  melatonin 5 milliGRAM(s) Oral at bedtime  morphine  - Injectable 1 milliGRAM(s) IV Push every 1 hour PRN  pantoprazole    Tablet 40 milliGRAM(s) Oral before breakfast  polyethylene glycol 3350 17 Gram(s) Oral daily  senna 2 Tablet(s) Oral at bedtime  sodium chloride 0.9% lock flush 10 milliLiter(s) IV Push every 1 hour PRN  sodium chloride 0.9% lock flush 10 milliLiter(s) IV Push every 1 hour PRN    acetaminophen   Tablet .. 650 milliGRAM(s) Oral every 6 hours PRN  ALBUTerol    90 MICROgram(s) HFA Inhaler 2 Puff(s) Inhalation every 6 hours  chlorhexidine 2% Cloths 1 Application(s) Topical <User Schedule>  chlorhexidine 4% Liquid 1 Application(s) Topical <User Schedule>  chlorhexidine 4% Liquid 1 Application(s) Topical <User Schedule>  dexMEDEtomidine Infusion 0.7 MICROgram(s)/kG/Hr IV Continuous <Continuous>  dextrose 5%. 1000 milliLiter(s) IV Continuous <Continuous>  dextrose 50% Injectable 25 Gram(s) IV Push once  dextrose 50% Injectable 12.5 Gram(s) IV Push once  enoxaparin Injectable 40 milliGRAM(s) SubCutaneous every 12 hours  glucagon  Injectable 1 milliGRAM(s) IntraMuscular once  insulin lispro (ADMELOG) corrective regimen sliding scale   SubCutaneous three times a day before meals  levothyroxine 100 MICROGram(s) Oral daily  melatonin 5 milliGRAM(s) Oral at bedtime  morphine  - Injectable 1 milliGRAM(s) IV Push every 1 hour PRN  pantoprazole    Tablet 40 milliGRAM(s) Oral before breakfast  phenylephrine    Infusion 0.1 MICROgram(s)/kG/Min IV Continuous <Continuous>  piperacillin/tazobactam IVPB.. 3.375 Gram(s) IV Intermittent every 8 hours  polyethylene glycol 3350 17 Gram(s) Oral daily  senna 2 Tablet(s) Oral at bedtime  sodium chloride 0.9% lock flush 10 milliLiter(s) IV Push every 1 hour PRN  sodium chloride 0.9% lock flush 10 milliLiter(s) IV Push every 1 hour PRN  vancomycin  IVPB 1250 milliGRAM(s) IV Intermittent every 12 hours    Drug Dosing Weight  Height (cm): 162.6 (01 Dec 2020 09:53)  Weight (kg): 85.7 (01 Dec 2020 09:53)  BMI (kg/m2): 32.4 (01 Dec 2020 09:53)  BSA (m2): 1.91 (01 Dec 2020 09:53)    CENTRAL LINE: [x ] YES [ ] NO  LOCATION:   DATE INSERTED: [facility] 12/14    DONALD: [ ] YES [x ] NO    DATE INSERTED:    A-LINE:  [ ] YES [x ] NO  LOCATION:   DATE INSERTED:    ICU Vital Signs Last 24 Hrs  T(C): 38.3 (15 Dec 2020 11:00), Max: 38.5 (15 Dec 2020 08:00)  T(F): 100.9 (15 Dec 2020 11:00), Max: 101.3 (15 Dec 2020 08:00)  HR: 68 (15 Dec 2020 12:25) (54 - 77)  BP: 106/63 (15 Dec 2020 11:00) (70/48 - 118/71)  BP(mean): 71 (15 Dec 2020 11:00) (48 - 98)  ABP: --  ABP(mean): --  RR: 41 (15 Dec 2020 11:00) (20 - 45)  SpO2: 88% (15 Dec 2020 12:25) (73% - 91%)      ABG - ( 14 Dec 2020 04:08 )  pH, Arterial: 7.42  pH, Blood: x     /  pCO2: 49    /  pO2: 58    / HCO3: 31    / Base Excess: 6.1   /  SaO2: 90                    12-14 @ 07:01  -  12-15 @ 07:00  --------------------------------------------------------  IN: 638.7 mL / OUT: 0 mL / NET: 638.7 mL            REVIEW OF SYSTEMS:    unable to obtain are pt is just saying she wants to be left alone    PHYSICAL EXAM:    GENERAL: respiratory distress   HEAD:  Atraumatic, Normocephalic  EYES: EOMI, PERRLA, conjunctiva and sclera clear  ENT: Moist mucous membranes  NECK: Supple, No JVD  CHEST/LUNG: No rales, rhonchi, wheezing, or rubs.  HEART: Regular rate and rhythm; S1+ S2+  ABDOMEN: Bowel sounds present; Soft, Nontender, Nondistended.  EXTREMITIES:  2+ Peripheral Pulses, brisk capillary refill. No clubbing, cyanosis, or edema  NERVOUS SYSTEM:  Alert and agitated   MSK: FROM all 4 extremities, full and equal strength  SKIN: No rashes or lesions    LABS:  CBC Full  -  ( 15 Dec 2020 06:08 )  WBC Count : 13.57 K/uL  RBC Count : 3.91 M/uL  Hemoglobin : 12.1 g/dL  Hematocrit : 38.8 %  Platelet Count - Automated : 420 K/uL  Mean Cell Volume : 99.2 fl  Mean Cell Hemoglobin : 30.9 pg  Mean Cell Hemoglobin Concentration : 31.2 gm/dL  Auto Neutrophil # : 12.05 K/uL  Auto Lymphocyte # : 0.75 K/uL  Auto Monocyte # : 0.38 K/uL  Auto Eosinophil # : 0.30 K/uL  Auto Basophil # : 0.01 K/uL  Auto Neutrophil % : 88.8 %  Auto Lymphocyte % : 5.5 %  Auto Monocyte % : 2.8 %  Auto Eosinophil % : 2.2 %  Auto Basophil % : 0.1 %    12-15    139  |  104  |  21<H>  ----------------------------<  102<H>  4.0   |  28  |  0.44<L>    Ca    9.3      15 Dec 2020 06:08  Phos  2.3     12-15  Mg     2.5     12-15    TPro  6.9  /  Alb  1.6<L>  /  TBili  0.6  /  DBili  x   /  AST  41<H>  /  ALT  99<H>  /  AlkPhos  248<H>  12-15            RADIOLOGY & ADDITIONAL STUDIES REVIEWED:  ***    [ ]GOALS OF CARE DISCUSSION WITH PATIENT/FAMILY/PROXY: DNR/ DNI, comfort measures     CRITICAL CARE TIME SPENT: 35 minutes

## 2020-12-15 NOTE — PROGRESS NOTE ADULT - ATTENDING COMMENTS
Assessment:   -Acute hypoxic respiratory failure due to COVID PNA,   -Sleep Apnea  -Hypothyroid    Plan  -continue O2 via HFNC to maintain sat >90%  -continue remdesivir   -continue decadron 6 mg /day  -Encourage non supine positioning as tolerated  -Incentive spirometry  -Cough suppressant therapy  -hemodynamic monitoring   -OOB to chair  -dvt/gi prophy
Assessment:   -Acute hypoxic respiratory failure due to COVID PNA,   -Sleep Apnea  -Hypothyroid    Plan  -continue O2 via HFNC to maintain sat >90%, required bipap support intermittently  -continue remdesivir   -continue decadron 6 mg /day  -Encourage non supine positioning as tolerated  -Cont. albuterol  -Incentive spirometry  -Cough suppressant therapy  - Xanax for anxiety  -hemodynamic monitoring   -OOB to chair  -dvt/gi prophy  - S/p convalescent plasma infusion 12/4
Assessment:   -Acute hypoxic respiratory failure due to COVID PNA,   -Sleep Apnea  -Hypothyroid        Plan  -continue O2 via HFNC to maintain sat >90%, required bipap support intermittently  -s/p remdesivir   -completed 10 days of decadron 6 mg , start to taper  -Encourage non supine positioning as tolerated  -Cont. albuterol  -Incentive spirometry  -Cough suppressant therapy  - Xanax for anxiety  -hemodynamic monitoring   -OOB to chair  -dvt/gi prophy  - S/p convalescent plasma infusion 12/4.
Assessment:   -Acute hypoxic respiratory failure due to COVID PNA,   -Sleep Apnea  -Hypothyroid        Plan  -continue O2 via HFNC to maintain sat >90%, required bipap support intermittently  -s/p remdesivir   -completed 10 days of decadron 6 mg , start to taper  -Encourage non supine positioning as tolerated  -Cont. albuterol  -Incentive spirometry  -Cough suppressant therapy  - Xanax for anxiety  -hemodynamic monitoring   -OOB to chair  -dvt/gi prophy  - S/p convalescent plasma infusion 12/4.
Assessment:   -Acute hypoxic respiratory failure due to COVID PNA,   -Sleep Apnea  -Hypothyroid        Plan  -continue O2 via HFNC to maintain sat >90%, required bipap support intermittently  -s/p remdesivir   -completed 10 days of decadron 6 mg , start to taper  -Encourage non supine positioning as tolerated  -Cont. albuterol  -Incentive spirometry  -Cough suppressant therapy  - Xanax for anxiety  -hemodynamic monitoring   -OOB to chair  -dvt/gi prophy  - S/p convalescent plasma infusion 12/4.    -DNR/DNI .
Assessment:   -Acute hypoxic respiratory failure due to COVID PNA,   -Sleep Apnea  -Hypothyroid    Plan  -continue O2 via HFNC to maintain sat >90%, required bipap support overnight  -continue remdesivir   -continue decadron 6 mg /day  -Encourage non supine positioning as tolerated  -Cont. albuterol  -Incentive spirometry  -Cough suppressant therapy  -hemodynamic monitoring   -OOB to chair  -dvt/gi prophy  - S/p convalescent plasma infusion 12/4
Attending Attestation:   Assessment:   -Acute hypoxic respiratory failure due to COVID PNA,   -Sleep Apnea  -Hypothyroid    Plan  -continue O2 via HFNC to maintain sat >90%, required bipap support intermittently  -continue remdesivir   -continue decadron 6 mg /day  -Encourage non supine positioning as tolerated  -Cont. albuterol  -Incentive spirometry  -Cough suppressant therapy  - Xanax for anxiety  -hemodynamic monitoring   -OOB to chair  -dvt/gi prophy  - S/p convalescent plasma infusion 12/4.
Assessment:   -Acute hypoxic respiratory failure due to COVID PNA,   -Sleep Apnea  -Hypothyroid        Plan  -continue O2 via HFNC to maintain sat >90%, required bipap support intermittently  -s/p remdesivir   -completed 10 days of decadron 6 mg , start to taper  -Encourage non supine positioning as tolerated  -Cont. albuterol  -Incentive spirometry  -Cough suppressant therapy  - Xanax for anxiety  -hemodynamic monitoring   -OOB to chair  -dvt/gi prophy  - S/p convalescent plasma infusion 12/4.    -DNR/DNI
Assessment:   -Acute hypoxic respiratory failure due to COVID PNA,   -Sleep Apnea  -Hypothyroid        Plan  -continue O2 via HFNC to maintain sat >90%, required bipap support intermittently  -s/p remdesivir   -completed 10 days of decadron 6 mg , start to taper  -Encourage non supine positioning as tolerated  -Cont. albuterol  -Incentive spirometry  -Cough suppressant therapy  - Xanax for anxiety  -hemodynamic monitoring   -OOB to chair  -dvt/gi prophy  - S/p convalescent plasma infusion 12/4.    -DNR/DNI .
Assessment:   -Acute hypoxic respiratory failure due to COVID PNA,   -Sleep Apnea  -Hypothyroid        Plan  -continue O2 via HFNC to maintain sat >90%, required bipap support intermittently  -s/p remdesivir   -continue decadron 6 mg /day for total 10 days  -Encourage non supine positioning as tolerated  -Cont. albuterol  -Incentive spirometry  -Cough suppressant therapy  - Xanax for anxiety  -hemodynamic monitoring   -OOB to chair  -dvt/gi prophy  - S/p convalescent plasma infusion 12/4.
Attending Attestation:   Assessment:   -Acute hypoxic respiratory failure due to COVID PNA,   -Sleep Apnea  -Hypothyroid        Plan  -continue O2 via HFNC to maintain sat >90%, required bipap support intermittently  -s/p remdesivir   -continue decadron 6 mg /day for total 10 days  -Encourage non supine positioning as tolerated  -Cont. albuterol  -Incentive spirometry  -Cough suppressant therapy  - Xanax for anxiety  -hemodynamic monitoring   -OOB to chair  -dvt/gi prophy  - S/p convalescent plasma infusion 12/4.
Assessment:   -Acute hypoxic respiratory failure due to COVID PNA,   -Sleep Apnea  -Hypothyroid        Plan  -continue O2 via HFNC to maintain sat >90%, required bipap support intermittently  -s/p remdesivir   -completed 10 days of decadron 6 mg , start to taper  -Encourage non supine positioning as tolerated  -Cont. albuterol  -Incentive spirometry  -Cough suppressant therapy  - Xanax for anxiety  -hemodynamic monitoring   -OOB to chair  -dvt/gi prophy  - S/p convalescent plasma infusion 12/4.    -DNR/DNI .  -now comfort care  - no blood works  -palliative care on board
Assessment:   -Acute hypoxic respiratory failure due to COVID PNA,   -Sleep Apnea  -Hypothyroid    Plan  -continue O2 via HFNC to maintain sat >90%  -continue remdesivir   -continue decadron 6 mg /day  -Encourage non supine positioning as tolerated  -Start albuterol  -Incentive spirometry  -Cough suppressant therapy  -hemodynamic monitoring   -OOB to chair  -dvt/gi prophy  - Patient requesting convalescent plasma. Explained risks vs. benefits for infusion at this time, given no obvious benefit from therapy. Patient understands risks associated with transfusion of blood products. Will give convalescent plasma 1 unit today.
patient alert, on BIPAP, breathing somewhat labored, remains under ICU care. GOC discussion as above. patient expressed to me that she is suffering and wishes to die, does not want to continue w BIPAP. Daughter confirmed patient's wishes. To transition to comfort measures, wishes to continue antibiotics for now, dc pressors. Transition off bipap to high flow. Morphine ATC w prns for breakthrough dyspnea as needed, may need infusion. Palliative will follow. DNR/DNI.

## 2020-12-15 NOTE — PROGRESS NOTE ADULT - PROBLEM SELECTOR PLAN 2
2/2 COVID-19 PNA. WBC 13.57. Patient continues IV abx, precedex, bipap, +pressor, morphine PRN. Patient completed remdesivir and decadron. Daughter/Sandra is HCP; DNR / DNI 2/2 COVID-19 PNA. WBC 13.57. Patient continues IV abx, precedex, bipap, +pressor, morphine PRN. Plan: d/c pressor, non-essential medications; change bipap to high flow for improved patient comfort. Grave prognosis. Goal of care is comfort.  Daughter/Sandra is HCP; DNR / DNI

## 2020-12-15 NOTE — CHART NOTE - NSCHARTNOTEFT_GEN_A_CORE
PC  contacted patient's daughter Sandra Stevenson [phone] to provide emotional support.  Patient is calling her daughter expressing physical and emotional distress which is "heart wrenching." PC  validated Ms. Stevenson's feelings and stated the medical team is managing the patient's symptoms, however it sounds like emotional distress for patient and family.  Ms. Stevenson shared feeling overwhelmed as she lost her maternal grandmother a few months ago secondary to Covid and this is reminiscent of that experience.  both  and daughter discussed this for sometime.  PC Sw also educated Ms. Stevenson regarding guardianship for her brother and informing his providers of their mother's hospitalization.  Sw provided the telephone contact information for [facility] as the family is interested in applying for disability benefits for the patient's son.  Ms Tapia inquired about the process of choosing a  home.  PC  stated the family picks a  home of their choice and makes them aware when the patient dies.  The  home contacts the admitting office and the process evolves from there.  PC  agreed to contact the patient's sons' coordinator and advocate for addl. respite hours in light of the patient's hospitalization and declining condition.  No addl. needs were expressed at this time.  Ms. Stevenson expressed appreciation of this sw's support.  PC Sw will remain available as needed.

## 2020-12-15 NOTE — PROGRESS NOTE ADULT - REASON FOR ADMISSION
COVID PNA requiring oxygen supplementation

## 2020-12-15 NOTE — PROGRESS NOTE ADULT - SUBJECTIVE AND OBJECTIVE BOX
Patient is a 60y old  Female who presents with a chief complaint of COVID PNA requiring oxygen supplementation (14 Dec 2020 11:52)    pt seen in icu [ x ], reg med floor [   ], bed [ x ], chair at bedside [   ], a+o x3 [ x ], lethargic [  ],    nad [x  ]        Allergies    No Known Allergies        Vitals    T(F): 98.6 (12-14-20 @ 16:00), Max: 99.5 (12-14-20 @ 12:00)  HR: 58 (12-15-20 @ 04:37) (54 - 82)  BP: 110/66 (12-15-20 @ 03:30) (68/46 - 118/71)  RR: 24 (12-15-20 @ 03:30) (18 - 45)  SpO2: 82% (12-15-20 @ 04:37) (73% - 89%)  Wt(kg): --  CAPILLARY BLOOD GLUCOSE      POCT Blood Glucose.: 112 mg/dL (15 Dec 2020 05:26)      Labs                          12.1   13.57 )-----------( 420      ( 15 Dec 2020 06:08 )             38.8       12-15    139  |  104  |  21<H>  ----------------------------<  102<H>  4.0   |  28  |  x     Ca    9.3      15 Dec 2020 06:08  Phos  3.3     12-14  Mg     2.5     12-15    TPro  x   /  Alb  1.6<L>  /  TBili  x   /  DBili  x   /  AST  x   /  ALT  x   /  AlkPhos  x   12-15                Radiology Results      Meds    MEDICATIONS  (STANDING):  ALBUTerol    90 MICROgram(s) HFA Inhaler 2 Puff(s) Inhalation every 6 hours  ALBUTerol    90 MICROgram(s) HFA Inhaler 1 Puff(s) Inhalation every 4 hours  chlorhexidine 2% Cloths 1 Application(s) Topical <User Schedule>  chlorhexidine 4% Liquid 1 Application(s) Topical <User Schedule>  chlorhexidine 4% Liquid 1 Application(s) Topical <User Schedule>  dexMEDEtomidine Infusion 0.7 MICROgram(s)/kG/Hr (15 mL/Hr) IV Continuous <Continuous>  dextrose 5%. 1000 milliLiter(s) (100 mL/Hr) IV Continuous <Continuous>  dextrose 50% Injectable 25 Gram(s) IV Push once  dextrose 50% Injectable 12.5 Gram(s) IV Push once  enoxaparin Injectable 40 milliGRAM(s) SubCutaneous every 12 hours  glucagon  Injectable 1 milliGRAM(s) IntraMuscular once  insulin lispro (ADMELOG) corrective regimen sliding scale   SubCutaneous three times a day before meals  levothyroxine 100 MICROGram(s) Oral daily  melatonin 5 milliGRAM(s) Oral at bedtime  pantoprazole    Tablet 40 milliGRAM(s) Oral before breakfast  phenylephrine    Infusion 0.1 MICROgram(s)/kG/Min (3.21 mL/Hr) IV Continuous <Continuous>  piperacillin/tazobactam IVPB.. 3.375 Gram(s) IV Intermittent every 8 hours  polyethylene glycol 3350 17 Gram(s) Oral daily  senna 2 Tablet(s) Oral at bedtime      MEDICATIONS  (PRN):  acetaminophen   Tablet .. 650 milliGRAM(s) Oral every 6 hours PRN Temp greater or equal to 38C (100.4F), Mild Pain (1 - 3)  acetaminophen   Tablet .. 650 milliGRAM(s) Oral every 6 hours PRN Mild Pain (1 - 3)  morphine  - Injectable 1 milliGRAM(s) IV Push every 1 hour PRN respiratory distress  sodium chloride 0.9% lock flush 10 milliLiter(s) IV Push every 1 hour PRN Pre/post blood products, medications, blood draw, and to maintain line patency  sodium chloride 0.9% lock flush 10 milliLiter(s) IV Push every 1 hour PRN Pre/post blood products, medications, blood draw, and to maintain line patency      Physical Exam    Neuro :  no focal deficits  Respiratory: CTA B/L  CV: RRR, S1S2, no murmurs,   Abdominal: Soft, NT, ND +BS,  Extremities: No edema, + peripheral pulses          ASSESSMENT    Infection due to severe acute respiratory syndrome coronavirus 2 (SARS-CoV-2)  pneumonia,   hypoxia,   h/o hypothyroid,   sleep apnea,   PUD,   cervical OA   tracheal resection and reconstruction,   uterus myoma,   HTN,   gi bleed  Osteoporosis  Hyperparathyroidism      PLAN    id f/u   Decadron d/c'd 12/10/20  completed Remdesivir 12/06/20  s/p convalescent plasma  cont contact and airborne isolation,   cont albuterol inhaler,   cont tylenol prn, robitussin prn   pulm f/u   O2 sat (80% - 95%) on bipap   cont bipap as needed,   tmx 100.6  dvt prophylaxis  covid-19 antibody test neg,   resp viral panel positive for covid 19,   procalcitonin noted,   D-dimer, crp increasing   ferritin decreasing   esr, ldh, noted    cxr 12/12/20 unchanged from previous noted above  endo f/u  hypercalcemia due to hyperparathyroidism  high vitamin d 1-25 ? etiology-   ace lev wnl   hx of parathyroid surgery r/o pth hyperplasia  worsening hypercalcemia - s/p one dose of iv aredia 90mg   cont to monitor calcium  keep pt hydrated   pt DNR/DNI,   cont current meds  mgmt as per icu       Patient is a 60y old  Female who presents with a chief complaint of COVID PNA requiring oxygen supplementation (14 Dec 2020 11:52)    pt seen in icu [ x ], reg med floor [   ], bed [ x ], chair at bedside [   ], a+o x3 [ x ], lethargic [  ],    nad [x  ]        Allergies    No Known Allergies        Vitals    T(F): 98.6 (12-14-20 @ 16:00), Max: 99.5 (12-14-20 @ 12:00)  HR: 58 (12-15-20 @ 04:37) (54 - 82)  BP: 110/66 (12-15-20 @ 03:30) (68/46 - 118/71)  RR: 24 (12-15-20 @ 03:30) (18 - 45)  SpO2: 82% (12-15-20 @ 04:37) (73% - 89%)  Wt(kg): --  CAPILLARY BLOOD GLUCOSE      POCT Blood Glucose.: 112 mg/dL (15 Dec 2020 05:26)      Labs                          12.1   13.57 )-----------( 420      ( 15 Dec 2020 06:08 )             38.8       12-15    139  |  104  |  21<H>  ----------------------------<  102<H>  4.0   |  28  |  x     Ca    9.3      15 Dec 2020 06:08  Phos  3.3     12-14  Mg     2.5     12-15    TPro  x   /  Alb  1.6<L>  /  TBili  x   /  DBili  x   /  AST  x   /  ALT  x   /  AlkPhos  x   12-15                Radiology Results      Meds    MEDICATIONS  (STANDING):  ALBUTerol    90 MICROgram(s) HFA Inhaler 2 Puff(s) Inhalation every 6 hours  ALBUTerol    90 MICROgram(s) HFA Inhaler 1 Puff(s) Inhalation every 4 hours  chlorhexidine 2% Cloths 1 Application(s) Topical <User Schedule>  chlorhexidine 4% Liquid 1 Application(s) Topical <User Schedule>  chlorhexidine 4% Liquid 1 Application(s) Topical <User Schedule>  dexMEDEtomidine Infusion 0.7 MICROgram(s)/kG/Hr (15 mL/Hr) IV Continuous <Continuous>  dextrose 5%. 1000 milliLiter(s) (100 mL/Hr) IV Continuous <Continuous>  dextrose 50% Injectable 25 Gram(s) IV Push once  dextrose 50% Injectable 12.5 Gram(s) IV Push once  enoxaparin Injectable 40 milliGRAM(s) SubCutaneous every 12 hours  glucagon  Injectable 1 milliGRAM(s) IntraMuscular once  insulin lispro (ADMELOG) corrective regimen sliding scale   SubCutaneous three times a day before meals  levothyroxine 100 MICROGram(s) Oral daily  melatonin 5 milliGRAM(s) Oral at bedtime  pantoprazole    Tablet 40 milliGRAM(s) Oral before breakfast  phenylephrine    Infusion 0.1 MICROgram(s)/kG/Min (3.21 mL/Hr) IV Continuous <Continuous>  piperacillin/tazobactam IVPB.. 3.375 Gram(s) IV Intermittent every 8 hours  polyethylene glycol 3350 17 Gram(s) Oral daily  senna 2 Tablet(s) Oral at bedtime      MEDICATIONS  (PRN):  acetaminophen   Tablet .. 650 milliGRAM(s) Oral every 6 hours PRN Temp greater or equal to 38C (100.4F), Mild Pain (1 - 3)  acetaminophen   Tablet .. 650 milliGRAM(s) Oral every 6 hours PRN Mild Pain (1 - 3)  morphine  - Injectable 1 milliGRAM(s) IV Push every 1 hour PRN respiratory distress  sodium chloride 0.9% lock flush 10 milliLiter(s) IV Push every 1 hour PRN Pre/post blood products, medications, blood draw, and to maintain line patency  sodium chloride 0.9% lock flush 10 milliLiter(s) IV Push every 1 hour PRN Pre/post blood products, medications, blood draw, and to maintain line patency      Physical Exam    Neuro :  no focal deficits  Respiratory: CTA B/L  CV: RRR, S1S2, no murmurs,   Abdominal: Soft, NT, ND +BS,  Extremities: No edema, + peripheral pulses          ASSESSMENT    Infection due to severe acute respiratory syndrome coronavirus 2 (SARS-CoV-2)  pneumonia,   hypoxia,   h/o hypothyroid,   sleep apnea,   PUD,   cervical OA   tracheal resection and reconstruction,   uterus myoma,   HTN,   gi bleed  Osteoporosis  Hyperparathyroidism      PLAN    pt on phenyi epherine  pt on precedex  id f/u   Decadron d/c'd 12/10/20  completed Remdesivir 12/06/20  s/p convalescent plasma   pt on zosyn  cont contact and airborne isolation,   cont albuterol inhaler,   cont tylenol prn, robitussin prn   pulm f/u   O2 sat (73% - 89%) on bipap   cont bipap as needed,   tmx 99.5  dvt prophylaxis  covid-19 antibody test neg,   resp viral panel positive for covid 19,   procalcitonin noted,   D-dimer, crp increasing   ferritin decreasing   esr, ldh, noted    cxr 12/12/20 unchanged from previous noted   endo f/u  hypercalcemia due to hyperparathyroidism  high vitamin d 1-25 ? etiology-   ace lev wnl   hx of parathyroid surgery r/o pth hyperplasia  worsening hypercalcemia - s/p one dose of iv aredia 90mg   cont to monitor calcium  keep pt hydrated   palliative f/u noted  Previously completed HCP form on file indicating daughter/Sandra Stevenson ([phone]) as HCP agent.   New MOLT completed: DNR / DNI / trial IV fluids / use abx / OK with central line and pressor support.  cont current meds  mgmt as per icu

## 2020-12-15 NOTE — PROGRESS NOTE ADULT - PROBLEM SELECTOR PLAN 5
See GOC section above. Previously completed HCP form on file indicating daughter/Sandra Stevenson ([phone]) as HCP agent. MOLST previously completed: DNR / DNI / trial IV fluids / use abx / OK with central line and pressor support. See GOC section above. Previously completed HCP form on file indicating daughter/Sandra Stevenson ([phone]) as HCP agent. MOLST previously completed: DNR / DNI / trial IV fluids / use abx / OK with central line and pressor support; updated today to include: D/C bipap, pressor, non-essential meds; goal of care is comfort. Overall, grave prognosis.

## 2020-12-15 NOTE — PROGRESS NOTE ADULT - SUBJECTIVE AND OBJECTIVE BOX
Patient is a 60y old  Female who presents with a chief complaint of COVID PNA requiring oxygen supplementation (15 Dec 2020 06:46)  Patient is sedated on Bi-pap machine, laying in bed in NAD. Oxygen sat 82-86% on 100% FIO2. Patient is DNR/DNI    INTERVAL HPI/OVERNIGHT EVENTS:      VITAL SIGNS:  T(F): 100.9 (12-15-20 @ 11:00)  HR: 68 (12-15-20 @ 12:25)  BP: 106/63 (12-15-20 @ 11:00)  RR: 41 (12-15-20 @ 11:00)  SpO2: 88% (12-15-20 @ 12:25)  Wt(kg): --  I&O's Detail    14 Dec 2020 07:01  -  15 Dec 2020 07:00  --------------------------------------------------------  IN:    Dexmedetomidine: 107.8 mL    Dexmedetomidine: 61.6 mL    IV PiggyBack: 325 mL    Phenylephrine: 144.3 mL  Total IN: 638.7 mL    OUT:  Total OUT: 0 mL    Total NET: 638.7 mL              REVIEW OF SYSTEMS:    CONSTITUTIONAL:  No fevers, chills, sweats    HEENT:  Eyes:  No diplopia or blurred vision. ENT:  No earache, sore throat or runny nose.    CARDIOVASCULAR:  No pressure, squeezing, tightness, or heaviness about the chest; no palpitations.    RESPIRATORY:  Per HPI    GASTROINTESTINAL:  No abdominal pain, nausea, vomiting or diarrhea.    GENITOURINARY:  No dysuria, frequency or urgency.    NEUROLOGIC:  No paresthesias, fasciculations, seizures or weakness.    PSYCHIATRIC:  No disorder of thought or mood.      PHYSICAL EXAM:    Constitutional: Well developed and nourished  Eyes:Perrla  ENMT: normal  Neck:supple  Respiratory: bilateral ronchi  Cardiovascular: S1 S2 regular  Gastrointestinal: Soft, Non tender  Extremities: No edema  Vascular:normal  Neurological: Sedated  Musculoskeletal:Normal      MEDICATIONS  (STANDING):  ALBUTerol    90 MICROgram(s) HFA Inhaler 1 Puff(s) Inhalation every 4 hours  ALBUTerol    90 MICROgram(s) HFA Inhaler 2 Puff(s) Inhalation every 6 hours  chlorhexidine 2% Cloths 1 Application(s) Topical <User Schedule>  chlorhexidine 4% Liquid 1 Application(s) Topical <User Schedule>  chlorhexidine 4% Liquid 1 Application(s) Topical <User Schedule>  dexMEDEtomidine Infusion 0.7 MICROgram(s)/kG/Hr (15 mL/Hr) IV Continuous <Continuous>  dextrose 5%. 1000 milliLiter(s) (100 mL/Hr) IV Continuous <Continuous>  dextrose 50% Injectable 25 Gram(s) IV Push once  dextrose 50% Injectable 12.5 Gram(s) IV Push once  enoxaparin Injectable 40 milliGRAM(s) SubCutaneous every 12 hours  glucagon  Injectable 1 milliGRAM(s) IntraMuscular once  insulin lispro (ADMELOG) corrective regimen sliding scale   SubCutaneous three times a day before meals  levothyroxine 100 MICROGram(s) Oral daily  melatonin 5 milliGRAM(s) Oral at bedtime  pantoprazole    Tablet 40 milliGRAM(s) Oral before breakfast  phenylephrine    Infusion 0.1 MICROgram(s)/kG/Min (3.21 mL/Hr) IV Continuous <Continuous>  piperacillin/tazobactam IVPB.. 3.375 Gram(s) IV Intermittent every 8 hours  polyethylene glycol 3350 17 Gram(s) Oral daily  senna 2 Tablet(s) Oral at bedtime  vancomycin  IVPB 1250 milliGRAM(s) IV Intermittent every 12 hours    MEDICATIONS  (PRN):  acetaminophen   Tablet .. 650 milliGRAM(s) Oral every 6 hours PRN Temp greater or equal to 38C (100.4F), Mild Pain (1 - 3)  acetaminophen   Tablet .. 650 milliGRAM(s) Oral every 6 hours PRN Mild Pain (1 - 3)  morphine  - Injectable 1 milliGRAM(s) IV Push every 1 hour PRN respiratory distress  sodium chloride 0.9% lock flush 10 milliLiter(s) IV Push every 1 hour PRN Pre/post blood products, medications, blood draw, and to maintain line patency  sodium chloride 0.9% lock flush 10 milliLiter(s) IV Push every 1 hour PRN Pre/post blood products, medications, blood draw, and to maintain line patency      Allergies    No Known Allergies    Intolerances        LABS:                        12.1   13.57 )-----------( 420      ( 15 Dec 2020 06:08 )             38.8     12-15    139  |  104  |  21<H>  ----------------------------<  102<H>  4.0   |  28  |  0.44<L>    Ca    9.3      15 Dec 2020 06:08  Phos  2.3     12-15  Mg     2.5     12-15    TPro  6.9  /  Alb  1.6<L>  /  TBili  0.6  /  DBili  x   /  AST  41<H>  /  ALT  99<H>  /  AlkPhos  248<H>  12-15        ABG - ( 14 Dec 2020 04:08 )  pH, Arterial: 7.42  pH, Blood: x     /  pCO2: 49    /  pO2: 58    / HCO3: 31    / Base Excess: 6.1   /  SaO2: 90                    CAPILLARY BLOOD GLUCOSE      POCT Blood Glucose.: 112 mg/dL (15 Dec 2020 05:26)  POCT Blood Glucose.: 111 mg/dL (14 Dec 2020 23:05)  POCT Blood Glucose.: 100 mg/dL (14 Dec 2020 16:39)    pro-bnp -- 12-14 @ 06:10     d-dimer 624  12-14 @ 06:10  pro-bnp -- 12-13 @ 06:46     d-dimer 468  12-13 @ 06:46      RADIOLOGY & ADDITIONAL TESTS:    CXR:  < from: Xray Chest 1 View-PORTABLE IMMEDIATE (Xray Chest 1 View-PORTABLE IMMEDIATE .) (12.14.20 @ 15:29) >  IMPRESSION:  New right central line noted tip overlying the right atrium. No pneumothorax.    Bilateral airspace opacities without significant change.    No pleural effusion    Heart size cannot be accurately assessed in this projection.      < end of copied text >    Ct scan chest:    ekg;    echo:

## 2020-12-15 NOTE — PROGRESS NOTE ADULT - PROBLEM SELECTOR PLAN 4
Patient from home; A&O, ambulatory, independent of ADLs at baseline. Now with acute respiratory failure requiring bipap; + shock on pressor, elevated LFTs.  Patient requires assistance with ADLs Patient from home; A&O, ambulatory, independent of ADLs at baseline. Now with acute respiratory failure requiring bipap; + shock on pressor, elevated LFTs.  Patient requires assistance with ADLs. Now for comfort care; grave prognosis.

## 2020-12-16 NOTE — DISCHARGE NOTE FOR THE EXPIRED PATIENT - HOSPITAL COURSE
61 yo F from home lives with son (who is autistic, covid +) PMHx of hypothyroid, sleep apnea, HTN PUD, cervical OA PSH tracheal resection and reconstruction, uterous myoma, presented to the ED c/o shortness of breath.  Pt was found to have Acute hypoxic respiratory failure due to COVID PNA. Pt completed Decadron and was on Vancomycin and Zosyn. Pt also go Morphine and Precedex for anxiety and respiratory distress.  Pt  on 2020 at 12:09 AM 59 yo F from home lives with son (who is autistic, covid +) PMHx of hypothyroid, sleep apnea, HTN PUD, cervical OA PSH tracheal resection and reconstruction, uterous myoma, presented to the ED c/o shortness of breath.  Pt was found to have Acute hypoxic respiratory failure due to COVID PNA. Pt ws initially on HFNC and later was transitioned to HFNC alongwith NRB. Pt completed Decadron and was on Vancomycin and Zosyn. Pt also go Morphine and Precedex for anxiety and respiratory distress.  Pt  on 2020 at 12:09 AM

## 2020-12-18 NOTE — CHART NOTE - NSCHARTNOTEFT_GEN_A_CORE
LUISITO Pascal followed up with the patient's daughter Sandra Stevenson, [phone], as per her request.  Patient  two days prior and Ms. Stevenson asked this  to support her in explaining the patient's death to her 19 y.o. brother who has autism.  Ms. Stevenson and her  informed her brother that their mother  and she stated she did not share the information sooner as he was completing his college finals.  Patient's son stated he knew as he saw his sister crying and had been calling his mother's cell phone.  Family assured the patient's son that they will continue to care for him, rely upon their tomasz and support each other through this loss.  Sandra suggested her brother behave like a strong man.  PC  recognized he can be strong and experience feelings of sadness, anger and loss.  SW also encouraged expression of feelings and continued  communication with each other and supporting one another.  Contact information was provided for [facility] bereavement services.  No addl. needs were identified at this time.  LUISITO Pascal confirmed one bag of the patient's belongings are with security and shared this information with Sandra Stevenson.    signing off.

## 2021-01-05 NOTE — ED PROVIDER NOTE - HISTORY ATTESTATION, MLM
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     Patient had been feeling lethargic and washed out and since he had worsening anemia he received 2 units of PRBCs at [facility] yesterday. He states his symptoms were not improved after the transfusions, and also developed new onset LE edema x 2 days . TTE done at his cardiologist reportedly showed normal LVF with no valvular abnormalities. Later that night, while in bed, the patient developed hallucinations associated with shortness of breath, palpitations and chills.     He was brought to the ER where he was febrile to 101F, in atrial fibrillation with RVR, hypoxic with an elevated lactate of 7.2. He was treated w/beta blocker, Cardizem, and Amiodarone and required pressors thereafter. He converted to NSR and has remained in sinus rhythm since.now on PO Amiodarone, Metoprolol, and Eliquis.  CT scan of the chest shows RUL mass vs PNA w multiple pulmonary nodules suspicious of mets. No lung biopsy per pulm due to diagnosis of nocardia abscesses. He also had a right gluteal soft tissue mass.     He was diagnosed with Nocardia bacteremia, placed on IV Imipenem, Amikacin, and PO bactrim for 6 weeks, starting 20. Endocarditis ruled out with negative TTE/JAZIEL 20 and Amikacin D/C-ed. Upon completion of IV abx, would need follow up with ID Dr. Lynch for further PO regimen. Patient is S/P bone marrow biopsy 20 which showed no organisms via AFB, GMS, PAS stains and low suspicion for active hemolysis. He also required 2 units prbc with this admission due to traumatic hematoma in LUE. 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 He was on chronic steroids, now on slow Prednisone taper. Renal consulted for azotemia and hyponatremia, now on 1200mL fluid restriction diet. He has nonproteinuric CKD3b.     Patient was medically stable for discharge to [place] for multidisciplinary acute rehab 20. Seen and examined on arrival to [place].    (22 Dec 2020 16:00)      INTERVAL HPI/OVERNIGHT EVENTS:  Chart Reviewed and patient seen at bedside.  Patient reports feeling well, and no issues.  Per ID, can stop IV imepenem but keep PICC.  Otherwise no other complaints or issues.    ROS: Denies fevers, chills, chest pain, shortness of breath, abdominal pain, headaches, nausea/vomiting.      Vital Signs Last 24 Hrs  T(C): 36.6 (2021 07:35), Max: 36.8 (2021 20:04)  T(F): 97.9 (2021 07:35), Max: 98.3 (2021 20:04)  HR: 61 (2021 07:35) (61 - 78)  BP: 109/71 (2021 07:35) (109/71 - 138/82)  BP(mean): --  RR: 14 (2021 07:35) (14 - 14)  SpO2: 96% (2021 07:35) (94% - 96%)    PHYSICAL EXAM:    · Constitutional	detailed exam  · Constitutional Comments	alert, NAD, pleasant O x 3  · Respiratory	detailed exam  · Respiratory Details	breath sounds equal; respirations non-labored; clear to auscultation bilaterally  · Cardiovascular	detailed exam  · Cardiovascular Details	regular rate and rhythm  · Gastrointestinal	detailed exam  · GI Normal	soft; nontender; bowel sounds normal  · Extremities	detailed exam  · Extremities Comments	BLE , SANDRA stockings off, 3+ pitting edema, no erythema or warmth, NT        LABS:      137  |  105  |  27<H>  ----------------------------<  91  4.5   |  22  |  1.41<H>      141  |  109<H>  |  27<H>  ----------------------------<  83  4.3   |  23  |  1.72<H>    Ca    8.3<L>      2021 05:00  Ca    8.0<L>      2021 05:30    TPro  4.7<L>  /  Alb  2.4<L>  /  TBili  0.4  /  DBili  x   /  AST  28  /  ALT  67<H>  /  AlkPhos  73  -                    Urinalysis Basic - ( 2021 00:41 )    Color: Yellow / Appearance: Clear / S.020 / pH: x  Gluc: x / Ketone: Trace  / Bili: Negative / Urobili: 1   Blood: x / Protein: 30 mg/dL / Nitrite: Negative   Leuk Esterase: Negative / RBC: 5-10 /HPF / WBC Negative /HPF   Sq Epi: x / Non Sq Epi: Neg.-Few / Bacteria: Negative /HPF                              9.4    7.54  )-----------( 134      ( 2021 08:31 )             29.0     CAPILLARY BLOOD GLUCOSE      POCT Blood Glucose.: 83 mg/dL (2021 07:37)      MEDICATIONS:  MEDICATIONS  (STANDING):  aMIOdarone    Tablet 200 milliGRAM(s) Oral daily  apixaban 5 milliGRAM(s) Oral two times a day  AQUAPHOR (petrolatum Ointment) 1 Application(s) Topical two times a day  atorvastatin 10 milliGRAM(s) Oral at bedtime  BACItracin   Ointment 1 Application(s) Topical daily  clotrimazole Lozenge 1 Lozenge Oral <User Schedule>  cyanocobalamin 1000 MICROGram(s) Oral daily  folic acid 1 milliGRAM(s) Oral daily  lactobacillus acidophilus 1 Tablet(s) Oral daily  levETIRAcetam 500 milliGRAM(s) Oral two times a day  metoprolol succinate ER 25 milliGRAM(s) Oral daily  midodrine. 2.5 milliGRAM(s) Oral <User Schedule>  multivitamin 1 Tablet(s) Oral daily  nystatin Powder 1 Application(s) Topical two times a day  predniSONE   Tablet 20 milliGRAM(s) Oral daily  tamsulosin 0.4 milliGRAM(s) Oral at bedtime  trimethoprim  160 mG/sulfamethoxazole 800 mG 2 Tablet(s) Oral two times a day    MEDICATIONS  (PRN):  clidinium/chlordiazepoxide 1 Capsule(s) Oral two times a day PRN abdominal spasm  dextrose 50% Injectable 25 milliLiter(s) IV Push every 15 minutes PRN hypoglycemia  polyethylene glycol 3350 17 Gram(s) Oral daily PRN Constipation         HPI:  77 yo M with PMH of h/o hemolytic anemia x 2 years, maintained on chronic HD steroids and blood transfusions, neuroendocrine tumor of the pancreas, BPH, GERD, HLD, Orthostatic Hypotension, IBS, h/o C.Diff Colitis, 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     Patient had been feeling lethargic and washed out and since he had worsening anemia he received 2 units of PRBCs at [facility] yesterday. He states his symptoms were not improved after the transfusions, and also developed new onset LE edema x 2 days . TTE done at his cardiologist reportedly showed normal LVF with no valvular abnormalities. Later that night, while in bed, the patient developed hallucinations associated with shortness of breath, palpitations and chills.     He was brought to the ER where he was febrile to 101F, in atrial fibrillation with RVR, hypoxic with an elevated lactate of 7.2. He was treated w/beta blocker, Cardizem, and Amiodarone and required pressors thereafter. He converted to NSR and has remained in sinus rhythm since.now on PO Amiodarone, Metoprolol, and Eliquis.  CT scan of the chest shows RUL mass vs PNA w multiple pulmonary nodules suspicious of mets. No lung biopsy per pulm due to diagnosis of nocardia abscesses. He also had a right gluteal soft tissue mass.     He was diagnosed with Nocardia bacteremia, placed on IV Imipenem, Amikacin, and PO bactrim for 6 weeks, starting 20. Endocarditis ruled out with negative TTE/JAZIEL 20 and Amikacin D/C-ed. Upon completion of IV abx, would need follow up with ID Dr. Lynch for further PO regimen. Patient is S/P bone marrow biopsy 20 which showed no organisms via AFB, GMS, PAS stains and low suspicion for active hemolysis. He also required 2 units prbc with this admission due to traumatic hematoma in LUE. 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 He was on chronic steroids, now on slow Prednisone taper. Renal consulted for azotemia and hyponatremia, now on 1200mL fluid restriction diet. He has nonproteinuric CKD3b.     Patient was medically stable for discharge to [place] for multidisciplinary acute rehab 20. Seen and examined on arrival to [place].    (22 Dec 2020 16:00)          ROS: Denies fevers, chills, chest pain, shortness of breath, abdominal pain, headaches, nausea/vomiting.      Vital Signs Last 24 Hrs  T(C): 36.6 (2021 07:35), Max: 36.8 (2021 20:04)  T(F): 97.9 (2021 07:35), Max: 98.3 (2021 20:04)  HR: 61 (2021 07:35) (61 - 78)  BP: 109/71 (2021 07:35) (109/71 - 138/82)  BP(mean): --  RR: 14 (2021 07:35) (14 - 14)  SpO2: 96% (2021 07:35) (94% - 96%)      Review of Systems:   · Additional ROS	Patient stable, participating in therapy. ID appreciated, sensitivity results from [place] returned, sensitive to both imipenim AND bactrim. Recommend continuing bactrim with monitoring Cr and follow up outpatient ID    Patient stable, no new complaints. Eager for dc home but understands logistics of home care and move for safety in dc home    PHYSICAL EXAM:    · Constitutional	detailed exam  · Constitutional Comments	alert, NAD, pleasant O x 3. continues to have improved problem-solving and insight and compex reasoning  · Respiratory	detailed exam  · Respiratory Details	breath sounds equal; respirations non-labored; clear to auscultation bilaterally  · Cardiovascular	detailed exam  · Cardiovascular Details	regular rate and rhythm  · Gastrointestinal	detailed exam  · GI Normal	soft; nontender; bowel sounds normal  · Extremities	detailed exam  · Extremities Comments	BLE , SANDRA stockings off, 2+ edema NT          LABS:      137  |  105  |  27<H>  ----------------------------<  91  4.5   |  22  |  1.41<H>      141  |  109<H>  |  27<H>  ----------------------------<  83  4.3   |  23  |  1.72<H>    Ca    8.3<L>      2021 05:00  Ca    8.0<L>      2021 05:30    TPro  4.7<L>  /  Alb  2.4<L>  /  TBili  0.4  /  DBili  x   /  AST  28  /  ALT  67<H>  /  AlkPhos  73  01-04                    Urinalysis Basic - ( 2021 00:41 )    Color: Yellow / Appearance: Clear / S.020 / pH: x  Gluc: x / Ketone: Trace  / Bili: Negative / Urobili: 1   Blood: x / Protein: 30 mg/dL / Nitrite: Negative   Leuk Esterase: Negative / RBC: 5-10 /HPF / WBC Negative /HPF   Sq Epi: x / Non Sq Epi: Neg.-Few / Bacteria: Negative /HPF                              9.4    7.54  )-----------( 134      ( 2021 08:31 )             29.0     CAPILLARY BLOOD GLUCOSE      POCT Blood Glucose.: 83 mg/dL (2021 07:37)      MEDICATIONS:  MEDICATIONS  (STANDING):  aMIOdarone    Tablet 200 milliGRAM(s) Oral daily  apixaban 5 milliGRAM(s) Oral two times a day  AQUAPHOR (petrolatum Ointment) 1 Application(s) Topical two times a day  atorvastatin 10 milliGRAM(s) Oral at bedtime  BACItracin   Ointment 1 Application(s) Topical daily  clotrimazole Lozenge 1 Lozenge Oral <User Schedule>  cyanocobalamin 1000 MICROGram(s) Oral daily  folic acid 1 milliGRAM(s) Oral daily  lactobacillus acidophilus 1 Tablet(s) Oral daily  levETIRAcetam 500 milliGRAM(s) Oral two times a day  metoprolol succinate ER 25 milliGRAM(s) Oral daily  midodrine. 2.5 milliGRAM(s) Oral <User Schedule>  multivitamin 1 Tablet(s) Oral daily  nystatin Powder 1 Application(s) Topical two times a day  predniSONE   Tablet 20 milliGRAM(s) Oral daily  tamsulosin 0.4 milliGRAM(s) Oral at bedtime  trimethoprim  160 mG/sulfamethoxazole 800 mG 2 Tablet(s) Oral two times a day    MEDICATIONS  (PRN):  clidinium/chlordiazepoxide 1 Capsule(s) Oral two times a day PRN abdominal spasm  dextrose 50% Injectable 25 milliLiter(s) IV Push every 15 minutes PRN hypoglycemia  polyethylene glycol 3350 17 Gram(s) Oral daily PRN Constipation         I have reviewed and confirmed nurses' notes...

## 2021-08-26 NOTE — PROGRESS NOTE ADULT - NEUROLOGICAL
Usman Daigle MD  You 22 hours ago (2:17 PM)     Okay to establish.    Routing comment         details… detailed exam

## 2022-04-12 NOTE — PHYSICAL THERAPY INITIAL EVALUATION ADULT - HEALTH SCREEN CRITERIA
Dr. Marbin Hernandez, Dr. Pavan North, Dr. Brown Albright  [facility]  [address] [place] [address]   Phone: [phone]  Fax: [phone]    or  [address] [facility]  [address]  [place] [address]  Phone: [phone]  or  [address] [place] [address]  Phone: [phone]  or  [address], [address]  [place] [address]  Phone: [phone]  OR    Dr. Petra Corrales, Dr. Steve Lee, Dr. Ming MartinezGregorio  [address] [address]  Phone:  [phone]  Fax:  [phone]    Mother notified and voiced understanding.  Above information given.  COM booked with PCP   yes

## 2022-04-17 NOTE — PROGRESS NOTE ADULT - SUBJECTIVE AND OBJECTIVE BOX
INTERVAL HPI/OVERNIGHT EVENTS: Patient is on HFNC, Started on Xanax during the day     PRESSORS: [ ] YES [x ] NO  WHICH:    ANTIBIOTICS:                  DATE STARTED:  ANTIBIOTICS:                  DATE STARTED:  ANTIBIOTICS:                  DATE STARTED:    Antimicrobial:    Cardiovascular:    Pulmonary:  ALBUTerol    90 MICROgram(s) HFA Inhaler 2 Puff(s) Inhalation every 6 hours    Hematalogic:  enoxaparin Injectable 40 milliGRAM(s) SubCutaneous daily    Other:  acetaminophen   Tablet .. 650 milliGRAM(s) Oral every 6 hours PRN  ALPRAZolam 1 milliGRAM(s) Oral every 12 hours  chlorhexidine 2% Cloths 1 Application(s) Topical <User Schedule>  dexAMETHasone     Tablet 6 milliGRAM(s) Oral daily  levothyroxine 100 MICROGram(s) Oral daily  morphine  - Injectable 1 milliGRAM(s) IV Push every 8 hours PRN  pantoprazole    Tablet 40 milliGRAM(s) Oral before breakfast  polyethylene glycol 3350 17 Gram(s) Oral daily  senna 2 Tablet(s) Oral at bedtime    acetaminophen   Tablet .. 650 milliGRAM(s) Oral every 6 hours PRN  ALBUTerol    90 MICROgram(s) HFA Inhaler 2 Puff(s) Inhalation every 6 hours  ALPRAZolam 1 milliGRAM(s) Oral every 12 hours  chlorhexidine 2% Cloths 1 Application(s) Topical <User Schedule>  dexAMETHasone     Tablet 6 milliGRAM(s) Oral daily  enoxaparin Injectable 40 milliGRAM(s) SubCutaneous daily  levothyroxine 100 MICROGram(s) Oral daily  morphine  - Injectable 1 milliGRAM(s) IV Push every 8 hours PRN  pantoprazole    Tablet 40 milliGRAM(s) Oral before breakfast  polyethylene glycol 3350 17 Gram(s) Oral daily  senna 2 Tablet(s) Oral at bedtime    Drug Dosing Weight  Height (cm): 162.6 (01 Dec 2020 09:53)  Weight (kg): 85.7 (01 Dec 2020 09:53)  BMI (kg/m2): 32.4 (01 Dec 2020 09:53)  BSA (m2): 1.91 (01 Dec 2020 09:53)    CENTRAL LINE: [ ] YES [ ] NO  LOCATION:   DATE INSERTED:  REMOVE: [ ] YES [ ] NO  EXPLAIN:    DONALD: [ ] YES [ ] NO    DATE INSERTED:  REMOVE:  [ ] YES [ ] NO  EXPLAIN:    A-LINE:  [ ] YES [ ] NO  LOCATION:   DATE INSERTED:  REMOVE:  [ ] YES [ ] NO  EXPLAIN:    PMH -reviewed admission note, no change since admission  PAST MEDICAL & SURGICAL HISTORY:  Lumbar back pain    Osteoporosis    History of tracheal stenosis    Other specified diseases of upper respiratory tract    Hypothyroidism    Kidney stones    Gallstones    Fatty liver    Thyroid disease    Obesity    Constipation    Gastric ulcer  February 2016- h/o GI bleed which patient had to be intubated Jan 2016    Arthritis of shoulder region, right    Arthritis  right shoulder    S/P bronchoscopy  2017    S/P bronchoscopy  5/16    Hyperparathyroidism  2012 parathyroidectomy    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    Gastric ulcer  &quot;four endoscopies&quot; for diagnosis of gastric ulcer in February 2016        ICU Vital Signs Last 24 Hrs  T(C): 37.1 (06 Dec 2020 20:00), Max: 37.3 (06 Dec 2020 16:34)  T(F): 98.8 (06 Dec 2020 20:00), Max: 99.2 (06 Dec 2020 16:34)  HR: 72 (07 Dec 2020 07:00) (72 - 115)  BP: 118/59 (07 Dec 2020 07:00) (104/74 - 156/69)  BP(mean): 74 (07 Dec 2020 07:00) (68 - 107)  ABP: --  ABP(mean): --  RR: 35 (07 Dec 2020 07:00) (18 - 44)  SpO2: 94% (07 Dec 2020 07:00) (81% - 100%)      ABG - ( 06 Dec 2020 14:12 )  pH, Arterial: 7.45  pH, Blood: x     /  pCO2: 38    /  pO2: 55    / HCO3: 27    / Base Excess: 3.1   /  SaO2: 88                    12-06 @ 07:01  -  12-07 @ 07:00  --------------------------------------------------------  IN: 620 mL / OUT: 1730 mL / NET: -1110 mL            PHYSICAL EXAM:    GENERAL: [ ]NAD, [ ]well-groomed, [ ]well-developed  HEAD:  [ ]Atraumatic, [ ]Normocephalic  EYES: [ ]EOMI, [ ]PERRLA, [ ]conjunctiva and sclera clear  ENMT: [ ]No tonsillar erythema, exudates, or enlargement; [ ]Moist mucous membranes, [ ]Good dentition, [ ]No lesions  NECK: [ ]Supple, normal appearance, [ ]No JVD; [ ]Normal thyroid; [ ]Trachea midline  NERVOUS SYSTEM:  [ ]Alert & Oriented X3, [ ]Good concentration; [ ]Motor Strength 5/5 B/L upper and lower extremities; [ ]DTRs 2+ intact and symmetric  CHEST/LUNG: [ ]No chest deformity; [ ]Normal percussion bilaterally; [ ]No rales, rhonchi, wheezing   HEART: [ ]Regular rate and rhythm; [ ]No murmurs, rubs, or gallops  ABDOMEN: [ ]Soft, Nontender, Nondistended; [ ]Bowel sounds present  EXTREMITIES:  [ ]2+ Peripheral Pulses, [ ]No clubbing, cyanosis, or edema  LYMPH: [ ]No lymphadenopathy noted  SKIN: [ ]No rashes or lesions; [ ]Good capillary refill      LABS:  CBC Full  -  ( 07 Dec 2020 05:35 )  WBC Count : 14.91 K/uL  RBC Count : 4.65 M/uL  Hemoglobin : 14.6 g/dL  Hematocrit : 45.4 %  Platelet Count - Automated : 329 K/uL  Mean Cell Volume : 97.6 fl  Mean Cell Hemoglobin : 31.4 pg  Mean Cell Hemoglobin Concentration : 32.2 gm/dL  Auto Neutrophil # : 13.72 K/uL  Auto Lymphocyte # : 0.66 K/uL  Auto Monocyte # : 0.32 K/uL  Auto Eosinophil # : 0.00 K/uL  Auto Basophil # : 0.03 K/uL  Auto Neutrophil % : 92.1 %  Auto Lymphocyte % : 4.4 %  Auto Monocyte % : 2.1 %  Auto Eosinophil % : 0.0 %  Auto Basophil % : 0.2 %    12-07    133<L>  |  101  |  18  ----------------------------<  170<H>  5.3   |  23  |  0.56    Ca    10.5      07 Dec 2020 05:35  Phos  2.6     12-07  Mg     2.2     12-07    TPro  7.3  /  Alb  2.1<L>  /  TBili  0.7  /  DBili  x   /  AST  91<H>  /  ALT  398<H>  /  AlkPhos  296<H>  12-07            RADIOLOGY & ADDITIONAL STUDIES REVIEWED:  ***    [ ]GOALS OF CARE DISCUSSION WITH PATIENT/FAMILY/PROXY:    CRITICAL CARE TIME SPENT: 35 minutes INTERVAL HPI/OVERNIGHT EVENTS: Patient is on HFNC, Started on Xanax during the day , No significant event overnight     PRESSORS: [ ] YES [x ] NO  WHICH:    ANTIBIOTICS:                  DATE STARTED:  ANTIBIOTICS:                  DATE STARTED:  ANTIBIOTICS:                  DATE STARTED:    Antimicrobial:    Cardiovascular:    Pulmonary:  ALBUTerol    90 MICROgram(s) HFA Inhaler 2 Puff(s) Inhalation every 6 hours    Hematalogic:  enoxaparin Injectable 40 milliGRAM(s) SubCutaneous daily    Other:  acetaminophen   Tablet .. 650 milliGRAM(s) Oral every 6 hours PRN  ALPRAZolam 1 milliGRAM(s) Oral every 12 hours  chlorhexidine 2% Cloths 1 Application(s) Topical <User Schedule>  dexAMETHasone     Tablet 6 milliGRAM(s) Oral daily  levothyroxine 100 MICROGram(s) Oral daily  morphine  - Injectable 1 milliGRAM(s) IV Push every 8 hours PRN  pantoprazole    Tablet 40 milliGRAM(s) Oral before breakfast  polyethylene glycol 3350 17 Gram(s) Oral daily  senna 2 Tablet(s) Oral at bedtime    acetaminophen   Tablet .. 650 milliGRAM(s) Oral every 6 hours PRN  ALBUTerol    90 MICROgram(s) HFA Inhaler 2 Puff(s) Inhalation every 6 hours  ALPRAZolam 1 milliGRAM(s) Oral every 12 hours  chlorhexidine 2% Cloths 1 Application(s) Topical <User Schedule>  dexAMETHasone     Tablet 6 milliGRAM(s) Oral daily  enoxaparin Injectable 40 milliGRAM(s) SubCutaneous daily  levothyroxine 100 MICROGram(s) Oral daily  morphine  - Injectable 1 milliGRAM(s) IV Push every 8 hours PRN  pantoprazole    Tablet 40 milliGRAM(s) Oral before breakfast  polyethylene glycol 3350 17 Gram(s) Oral daily  senna 2 Tablet(s) Oral at bedtime    Drug Dosing Weight  Height (cm): 162.6 (01 Dec 2020 09:53)  Weight (kg): 85.7 (01 Dec 2020 09:53)  BMI (kg/m2): 32.4 (01 Dec 2020 09:53)  BSA (m2): 1.91 (01 Dec 2020 09:53)    CENTRAL LINE: [ ] YES [ x] NO  LOCATION:   DATE INSERTED:  REMOVE: [ ] YES [ ] NO  EXPLAIN:    DONALD: [ ] YES [ ] NO    DATE INSERTED:  REMOVE:  [ ] YES [ ] NO  EXPLAIN:    A-LINE:  [ ] YES [ ] NO  LOCATION:   DATE INSERTED:  REMOVE:  [ ] YES [ ] NO  EXPLAIN:    PMH -reviewed admission note, no change since admission  PAST MEDICAL & SURGICAL HISTORY:  Lumbar back pain    Osteoporosis    History of tracheal stenosis    Other specified diseases of upper respiratory tract    Hypothyroidism    Kidney stones    Gallstones    Fatty liver    Thyroid disease    Obesity    Constipation    Gastric ulcer  February 2016- h/o GI bleed which patient had to be intubated Jan 2016    Arthritis of shoulder region, right    Arthritis  right shoulder    S/P bronchoscopy  2017    S/P bronchoscopy  5/16    Hyperparathyroidism  2012 parathyroidectomy    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    Gastric ulcer  &quot;four endoscopies&quot; for diagnosis of gastric ulcer in February 2016        ICU Vital Signs Last 24 Hrs  T(C): 37.1 (06 Dec 2020 20:00), Max: 37.3 (06 Dec 2020 16:34)  T(F): 98.8 (06 Dec 2020 20:00), Max: 99.2 (06 Dec 2020 16:34)  HR: 72 (07 Dec 2020 07:00) (72 - 115)  BP: 118/59 (07 Dec 2020 07:00) (104/74 - 156/69)  BP(mean): 74 (07 Dec 2020 07:00) (68 - 107)  ABP: --  ABP(mean): --  RR: 35 (07 Dec 2020 07:00) (18 - 44)  SpO2: 94% (07 Dec 2020 07:00) (81% - 100%)      ABG - ( 06 Dec 2020 14:12 )  pH, Arterial: 7.45  pH, Blood: x     /  pCO2: 38    /  pO2: 55    / HCO3: 27    / Base Excess: 3.1   /  SaO2: 88                    12-06 @ 07:01  -  12-07 @ 07:00  --------------------------------------------------------  IN: 620 mL / OUT: 1730 mL / NET: -1110 mL            PHYSICAL EXAM:    GENERAL: [x] NAD [ x]well-groomed, [x ]well-developed  HEAD:  [x ]Atraumatic, [ x]Normocephalic  EYES: [x ]EOMI, [x ]PERRLA, [x ]conjunctiva and sclera clear  ENMT: [ x]No tonsillar erythema, exudates, or enlargement; [ x]Moist mucous membranes, [ x]Good dentition, [ ]No lesions  NECK: [ x]Supple, normal appearance, [x ]No JVD;   NERVOUS SYSTEM:  [x ]Alert & Oriented X3, [x ]Good concentration; [x ]Motor Strength 5/5 B/L upper and lower extremities;  CHEST/LUNG: [ x]No chest deformity; [ x]Normal percussion bilaterally; [ x] rales  HEART: [ x]Regular rate and rhythm; [ x]No murmurs, rubs, or gallops  ABDOMEN: [ x]Soft, Nontender, Nondistended; [x ]Bowel sounds present  EXTREMITIES:  [ x]2+ Peripheral Pulses, [ x]No clubbing, cyanosis, or edema  LYMPH: [x ]No lymphadenopathy noted  SKIN: [ x]No rashes or lesions; [x ]Good capillary refill      LABS:  CBC Full  -  ( 07 Dec 2020 05:35 )  WBC Count : 14.91 K/uL  RBC Count : 4.65 M/uL  Hemoglobin : 14.6 g/dL  Hematocrit : 45.4 %  Platelet Count - Automated : 329 K/uL  Mean Cell Volume : 97.6 fl  Mean Cell Hemoglobin : 31.4 pg  Mean Cell Hemoglobin Concentration : 32.2 gm/dL  Auto Neutrophil # : 13.72 K/uL  Auto Lymphocyte # : 0.66 K/uL  Auto Monocyte # : 0.32 K/uL  Auto Eosinophil # : 0.00 K/uL  Auto Basophil # : 0.03 K/uL  Auto Neutrophil % : 92.1 %  Auto Lymphocyte % : 4.4 %  Auto Monocyte % : 2.1 %  Auto Eosinophil % : 0.0 %  Auto Basophil % : 0.2 %    12-07    133<L>  |  101  |  18  ----------------------------<  170<H>  5.3   |  23  |  0.56    Ca    10.5      07 Dec 2020 05:35  Phos  2.6     12-07  Mg     2.2     12-07    TPro  7.3  /  Alb  2.1<L>  /  TBili  0.7  /  DBili  x   /  AST  91<H>  /  ALT  398<H>  /  AlkPhos  296<H>  12-07            RADIOLOGY & ADDITIONAL STUDIES REVIEWED:  yes    [ ]GOALS OF CARE DISCUSSION WITH PATIENT/FAMILY/PROXY:    CRITICAL CARE TIME SPENT: 35 minutes No

## 2022-06-11 NOTE — ASU DISCHARGE PLAN (ADULT/PEDIATRIC). - PATIENT/GUARDIAN NAME (SIGNATURE): ____________________________________
Statement Selected

## 2022-09-29 NOTE — ED ADULT NURSE NOTE - CAS EDN INTEG ASSESS
Patient is doing well on the Lexapro 5 mg but has not slept well without her remeron willl restart at a lower dose 15 mg WDL

## 2023-06-01 NOTE — ED ADULT NURSE NOTE - FINAL NURSING ELECTRONIC SIGNATURE

## 2023-08-14 NOTE — ASU PATIENT PROFILE, ADULT - SPIRITUAL CULTURAL, CURRENT SITUATION, PROFILE
assuming care for this pt from SHONNA man. pt awake and oriented x 4.  pt ambulatory with assistance. family at bedside. nad noted
no

## 2023-08-23 NOTE — CONSULT NOTE ADULT - CONSULT REASON
Left message to continue current medications and to continue to monitor her blood sugar and record the results; instructed to watch what she is eating during the day and see if helps blood sugar; instructed to call if blood sugars consistently increased.   SOB

## 2024-01-12 NOTE — H&P PST ADULT - NS MD HP HEP C STATUS
01/12/24 1429   OTHER   Discipline occupational therapist   Rehab Time/Intention   Session Not Performed patient/family declined, not feeling well  (pt not feeling great., just had pain meds, just ate. wanting to rest. not up for any type of therapy. 1405)        Negative

## 2024-07-30 NOTE — ED ADULT NURSE NOTE - DISCHARGE DATE/TIME
Chronic, controlled. Latest blood pressure and vitals reviewed-     Temp:  [97.5 °F (36.4 °C)-98.5 °F (36.9 °C)]   Pulse:  [60-81]   Resp:  [12-22]   BP: ()/(57-69)   SpO2:  [68 %-100 %] .   Home meds for hypertension were reviewed and noted below.   Hypertension Medications               furosemide (LASIX) 40 MG tablet Take 1 tablet (40 mg total) by mouth 2 (two) times daily. Resume as needed for edema until followup with your PCP.    lisinopriL (PRINIVIL,ZESTRIL) 40 MG tablet Take 1 tablet (40 mg total) by mouth once daily.    metoprolol succinate (TOPROL-XL) 100 MG 24 hr tablet Take 1 tablet (100 mg total) by mouth 2 (two) times daily.    NIFEdipine (PROCARDIA-XL) 60 MG (OSM) 24 hr tablet Take 1 tablet (60 mg total) by mouth once daily.            While in the hospital, will manage blood pressure as follows; Adjust home antihypertensive regimen as follows- Continue toprol for afib, hold other antihypertensives d/t need for aggressive diuresis plus presence of JHONATAN. Did add spironolactone for K retention and now presence of ascites in setting of possible cirrhosis     Will utilize p.r.n. blood pressure medication only if patient's blood pressure greater than 220/110 and she develops symptoms such as worsening chest pain or shortness of breath.   07-Nov-2018 06:30

## 2024-11-21 NOTE — H&P PST ADULT - DOES THIS PATIENT HAVE A HISTORY OF OR HAS BEEN DX WITH HEART FAILURE?
Pt notified of results.    ----- Message from Garret Gore MD sent at 11/21/2024 10:57 AM CST -----  Please let Mykel know her colonoscopy was normal, it is recommended she repeat this in 10 years   
no